# Patient Record
Sex: FEMALE | Race: WHITE | Employment: UNEMPLOYED | URBAN - METROPOLITAN AREA
[De-identification: names, ages, dates, MRNs, and addresses within clinical notes are randomized per-mention and may not be internally consistent; named-entity substitution may affect disease eponyms.]

---

## 2017-10-01 ENCOUNTER — HOSPITAL ENCOUNTER (EMERGENCY)
Age: 57
Discharge: HOME | End: 2017-10-02
Attending: EMERGENCY MEDICINE
Payer: SELF-PAY

## 2017-10-01 DIAGNOSIS — E87.6 HYPOKALEMIA: ICD-10-CM

## 2017-10-01 DIAGNOSIS — E83.42 HYPOMAGNESEMIA: ICD-10-CM

## 2017-10-01 DIAGNOSIS — R79.89 ELEVATED LFTS: ICD-10-CM

## 2017-10-01 DIAGNOSIS — K62.3 RECTAL PROLAPSE: ICD-10-CM

## 2017-10-01 DIAGNOSIS — F10.10 ALCOHOL ABUSE: Primary | ICD-10-CM

## 2017-10-01 LAB
ALBUMIN SERPL-MCNC: 3 G/DL (ref 3.4–5)
ALBUMIN/GLOB SERPL: 1 {RATIO} (ref 0.8–1.7)
ALP SERPL-CCNC: 143 U/L (ref 45–117)
ALT SERPL-CCNC: 117 U/L (ref 13–56)
ANION GAP SERPL CALC-SCNC: 10 MMOL/L (ref 3–18)
APPEARANCE UR: CLEAR
AST SERPL-CCNC: 211 U/L (ref 15–37)
BASOPHILS # BLD: 0 K/UL (ref 0–0.06)
BASOPHILS NFR BLD: 0 % (ref 0–2)
BILIRUB SERPL-MCNC: 0.3 MG/DL (ref 0.2–1)
BILIRUB UR QL: NEGATIVE
BUN SERPL-MCNC: 6 MG/DL (ref 7–18)
BUN/CREAT SERPL: 12 (ref 12–20)
CALCIUM SERPL-MCNC: 7.8 MG/DL (ref 8.5–10.1)
CHLORIDE SERPL-SCNC: 98 MMOL/L (ref 100–108)
CO2 SERPL-SCNC: 27 MMOL/L (ref 21–32)
COLOR UR: YELLOW
CREAT SERPL-MCNC: 0.49 MG/DL (ref 0.6–1.3)
DIFFERENTIAL METHOD BLD: ABNORMAL
EOSINOPHIL # BLD: 0.2 K/UL (ref 0–0.4)
EOSINOPHIL NFR BLD: 4 % (ref 0–5)
ERYTHROCYTE [DISTWIDTH] IN BLOOD BY AUTOMATED COUNT: 14.1 % (ref 11.6–14.5)
GLOBULIN SER CALC-MCNC: 2.9 G/DL (ref 2–4)
GLUCOSE SERPL-MCNC: 210 MG/DL (ref 74–99)
GLUCOSE UR STRIP.AUTO-MCNC: 250 MG/DL
HCT VFR BLD AUTO: 35.5 % (ref 35–45)
HGB BLD-MCNC: 12.8 G/DL (ref 12–16)
HGB UR QL STRIP: NEGATIVE
KETONES UR QL STRIP.AUTO: NEGATIVE MG/DL
LEUKOCYTE ESTERASE UR QL STRIP.AUTO: NEGATIVE
LYMPHOCYTES # BLD: 1.6 K/UL (ref 0.9–3.6)
LYMPHOCYTES NFR BLD: 31 % (ref 21–52)
MAGNESIUM SERPL-MCNC: 1.3 MG/DL (ref 1.6–2.6)
MCH RBC QN AUTO: 32.5 PG (ref 24–34)
MCHC RBC AUTO-ENTMCNC: 36.1 G/DL (ref 31–37)
MCV RBC AUTO: 90.1 FL (ref 74–97)
MONOCYTES # BLD: 1.1 K/UL (ref 0.05–1.2)
MONOCYTES NFR BLD: 21 % (ref 3–10)
NEUTS SEG # BLD: 2.3 K/UL (ref 1.8–8)
NEUTS SEG NFR BLD: 44 % (ref 40–73)
NITRITE UR QL STRIP.AUTO: NEGATIVE
PH UR STRIP: 7 [PH] (ref 5–8)
PLATELET # BLD AUTO: 162 K/UL (ref 135–420)
PMV BLD AUTO: 9.8 FL (ref 9.2–11.8)
POTASSIUM SERPL-SCNC: 2.7 MMOL/L (ref 3.5–5.5)
PROT SERPL-MCNC: 5.9 G/DL (ref 6.4–8.2)
PROT UR STRIP-MCNC: NEGATIVE MG/DL
RBC # BLD AUTO: 3.94 M/UL (ref 4.2–5.3)
SODIUM SERPL-SCNC: 135 MMOL/L (ref 136–145)
SP GR UR REFRACTOMETRY: 1.01 (ref 1–1.03)
UROBILINOGEN UR QL STRIP.AUTO: 1 EU/DL (ref 0.2–1)
WBC # BLD AUTO: 5.1 K/UL (ref 4.6–13.2)

## 2017-10-01 PROCEDURE — 74011250637 HC RX REV CODE- 250/637: Performed by: EMERGENCY MEDICINE

## 2017-10-01 PROCEDURE — 74011000250 HC RX REV CODE- 250: Performed by: EMERGENCY MEDICINE

## 2017-10-01 PROCEDURE — 83735 ASSAY OF MAGNESIUM: CPT | Performed by: EMERGENCY MEDICINE

## 2017-10-01 PROCEDURE — 96365 THER/PROPH/DIAG IV INF INIT: CPT

## 2017-10-01 PROCEDURE — 96375 TX/PRO/DX INJ NEW DRUG ADDON: CPT

## 2017-10-01 PROCEDURE — 96368 THER/DIAG CONCURRENT INF: CPT

## 2017-10-01 PROCEDURE — 81003 URINALYSIS AUTO W/O SCOPE: CPT | Performed by: EMERGENCY MEDICINE

## 2017-10-01 PROCEDURE — 74011250636 HC RX REV CODE- 250/636: Performed by: EMERGENCY MEDICINE

## 2017-10-01 PROCEDURE — 85025 COMPLETE CBC W/AUTO DIFF WBC: CPT | Performed by: EMERGENCY MEDICINE

## 2017-10-01 PROCEDURE — 80053 COMPREHEN METABOLIC PANEL: CPT | Performed by: EMERGENCY MEDICINE

## 2017-10-01 PROCEDURE — 96366 THER/PROPH/DIAG IV INF ADDON: CPT

## 2017-10-01 PROCEDURE — 99284 EMERGENCY DEPT VISIT MOD MDM: CPT

## 2017-10-01 RX ORDER — POTASSIUM CHLORIDE 7.45 MG/ML
10 INJECTION INTRAVENOUS
Status: COMPLETED | OUTPATIENT
Start: 2017-10-01 | End: 2017-10-01

## 2017-10-01 RX ORDER — MAGNESIUM SULFATE HEPTAHYDRATE 40 MG/ML
2 INJECTION, SOLUTION INTRAVENOUS ONCE
Status: COMPLETED | OUTPATIENT
Start: 2017-10-01 | End: 2017-10-02

## 2017-10-01 RX ORDER — ONDANSETRON 2 MG/ML
4 INJECTION INTRAMUSCULAR; INTRAVENOUS
Status: COMPLETED | OUTPATIENT
Start: 2017-10-01 | End: 2017-10-01

## 2017-10-01 RX ORDER — POTASSIUM CHLORIDE 20 MEQ/1
40 TABLET, EXTENDED RELEASE ORAL
Status: COMPLETED | OUTPATIENT
Start: 2017-10-01 | End: 2017-10-01

## 2017-10-01 RX ADMIN — SODIUM CHLORIDE 1000 ML: 900 INJECTION, SOLUTION INTRAVENOUS at 22:22

## 2017-10-01 RX ADMIN — POTASSIUM CHLORIDE 40 MEQ: 20 TABLET, EXTENDED RELEASE ORAL at 22:58

## 2017-10-01 RX ADMIN — ONDANSETRON 4 MG: 2 INJECTION INTRAMUSCULAR; INTRAVENOUS at 22:22

## 2017-10-01 RX ADMIN — POTASSIUM CHLORIDE 10 MEQ: 7.46 INJECTION, SOLUTION INTRAVENOUS at 22:59

## 2017-10-01 RX ADMIN — FOLIC ACID: 5 INJECTION, SOLUTION INTRAMUSCULAR; INTRAVENOUS; SUBCUTANEOUS at 22:21

## 2017-10-01 RX ADMIN — MAGNESIUM SULFATE HEPTAHYDRATE 2 G: 40 INJECTION, SOLUTION INTRAVENOUS at 23:33

## 2017-10-02 VITALS
DIASTOLIC BLOOD PRESSURE: 67 MMHG | HEIGHT: 69 IN | TEMPERATURE: 98.5 F | HEART RATE: 77 BPM | RESPIRATION RATE: 11 BRPM | OXYGEN SATURATION: 99 % | BODY MASS INDEX: 19.55 KG/M2 | WEIGHT: 132 LBS | SYSTOLIC BLOOD PRESSURE: 112 MMHG

## 2017-10-02 PROCEDURE — 74011250636 HC RX REV CODE- 250/636: Performed by: EMERGENCY MEDICINE

## 2017-10-02 RX ORDER — CALCIUM CARBONATE/VITAMIN D3 500-10/5ML
1 LIQUID (ML) ORAL DAILY
Qty: 6 TAB | Refills: 0 | Status: SHIPPED | OUTPATIENT
Start: 2017-10-02 | End: 2017-11-05

## 2017-10-02 RX ORDER — POTASSIUM CHLORIDE 20 MEQ/1
20 TABLET, EXTENDED RELEASE ORAL 2 TIMES DAILY
Qty: 6 TAB | Refills: 0 | Status: SHIPPED | OUTPATIENT
Start: 2017-10-02 | End: 2017-11-05

## 2017-10-02 RX ADMIN — MAGNESIUM SULFATE HEPTAHYDRATE 2 G: 40 INJECTION, SOLUTION INTRAVENOUS at 00:12

## 2017-10-02 NOTE — DISCHARGE INSTRUCTIONS
Electrolyte Imbalance: Care Instructions  Your Care Instructions  Electrolytes are minerals in your blood. They include sodium, potassium, calcium, and magnesium. When they are not at the right levels, you can feel very ill. You may not know what is causing it, but you know something is wrong. You may feel weak or numb, have muscle spasms, or twitch. Your heart may beat fast. Symptoms are different with each mineral. Too much is as bad as too little. Minerals help keep your body working as it should. Vomiting, diarrhea, and fever can cause you to lose minerals. A problem with your kidneys can tip a mineral out of balance. So can taking certain medicines. Your doctor may do more tests. He or she may change your medicine and diet. If you are low in one or more minerals, they may be given through a tube into your vein (IV). Your doctor may have you take or drink special fluids or pills. If your kidneys are failing, your blood may be filtered. This is called dialysis. It can put the minerals back in balance. Follow-up care is a key part of your treatment and safety. Be sure to make and go to all appointments, and call your doctor if you are having problems. It's also a good idea to know your test results and keep a list of the medicines you take. How can you care for yourself at home? · Take your medicines exactly as prescribed. Call your doctor if you have any problems with your medicines. You will get more details on the specific medicines your doctor prescribes. · Do not take any medicine without talking to your doctor first. This includes prescription, over-the-counter, and herbal medicines. · If you have kidney, heart, or liver disease and have to limit fluids, talk with your doctor before you increase the amount of fluids you drink. · Your doctor or dietitian may give you a diet plan to help balance your minerals. Follow the diet carefully. When should you call for help?   Call 911 anytime you think you may need emergency care. For example, call if:  · You passed out (lost consciousness). · Your heart seems to be speeding up and then slowing down or skipping beats. Call your doctor now or seek immediate medical care if:  · You are very tired and have no energy. · You have trouble thinking or concentrating. Watch closely for changes in your health, and be sure to contact your doctor if:  · You want advice on what to do to keep your minerals in balance. · You do not get better as expected. Where can you learn more? Go to http://charles-bernie.info/. Enter W699 in the search box to learn more about \"Electrolyte Imbalance: Care Instructions. \"  Current as of: July 26, 2016  Content Version: 11.3  © 9073-3395 [employer] ([employer] Care instructions adapted under license by [email or web address] (which disclaims liability or warranty for this information). If you have questions about a medical condition or this instruction, always ask your healthcare professional. [address] any warranty or liability for your use of this information. Learning About Alcohol Misuse  What is alcohol misuse? Alcohol misuse means drinking so much that it causes problems for you or others. Early problems with alcohol can start at home. You may argue with loved ones about how much you're drinking. Your job may be affected because of drinking. You may drink when it's dangerous or illegal, such as when you drive. Drinking too much for a long time can lead to health conditions like high blood pressure and liver problems. What are the symptoms? Symptoms of alcohol misuse may include:  · Drinking much more than you planned. · Drinking even though it's causing problems for you or others. · Putting yourself in situations where you might get hurt. · Wanting to cut down or stop drinking, but not being able to. · Feeling guilty about how much you're drinking.   How is alcohol misuse treated? Getting help for problems with alcohol is up to you. But you don't have to do it alone. There are many people and kinds of treatments to help with alcohol problems. Talking to your doctor is the first step. When you get a doctor's help, treatment for alcohol problems can be safer and quicker. Treatment options can include:  · Treatment programs. Examples are group therapy, one or more types of counseling, and alcohol education. · Medicines. A doctor or counselor can help you know what kinds of medicines might help with cravings. · Free social support groups. These groups include AA (Alcoholics Anonymous) and SMART (Self-Management and Recovery Training). Your doctor can help you decide which type of program is best for you. Follow-up care is a key part of your treatment and safety. Be sure to make and go to all appointments, and call your doctor if you are having problems. It's also a good idea to know your test results and keep a list of the medicines you take. Where can you learn more? Go to http://charles-bernie.info/. Enter [id number] in the search box to learn more about \"Learning About Alcohol Misuse. \"  Current as of: January 3, 2017  Content Version: 11.3  © 4597-2649 [employer]. Care instructions adapted under license by [employer] (which disclaims liability or warranty for this information). If you have questions about a medical condition or this instruction, always ask your healthcare professional. [place] [address] any warranty or liability for your use of this information.

## 2017-10-02 NOTE — ED PROVIDER NOTES
HPI Comments: Patient with a history of anxiety tobacco abuse alcohol abuse psychiatric disorder and depression COPD and rectal prolapse presents with diarrhea for the last 2 days, she reports nausea one episode of vomiting a few days ago that has resolved, she drank 2 cups of vodka today and has persistent diarrhea and feels dehydrated. She reports muscle cramping and a cough productive of greenish sputum, no measured fever at home. She has been trying to stay hydrated by drinking Gatorade and vodka. No urinary symptoms, reports that her rectal prolapse has recurred but she is able to reduce it herself    Patient is a 61 y.o. female presenting with vomiting and diarrhea. Vomiting    Associated symptoms include diarrhea. Pertinent negatives include no fever, no headaches, no myalgias and no headaches. Diarrhea    Associated symptoms include diarrhea and vomiting. Pertinent negatives include no fever, no dysuria, no headaches, no myalgias and no chest pain. Past Medical History:   Diagnosis Date    Alcohol abuse     Anxiety     Chronic obstructive pulmonary disease ([address]     Depression     Hypertension     Psychiatric disorder     Tobacco abuse        Past Surgical History:   Procedure Laterality Date    HX GYN               Family History:   Problem Relation Age of Onset    Diabetes Mother     Hypertension Mother     Heart Attack Mother     Hypertension Father     Diabetes Brother        Social History     Social History    Marital status:      Spouse name: N/A    Number of children: N/A    Years of education: N/A     Occupational History    Not on file.      Social History Main Topics    Smoking status: Current Every Day Smoker     Packs/day: 0.50    Smokeless tobacco: Never Used    Alcohol use Yes      Comment: socially    Drug use: No    Sexual activity: Not on file     Other Topics Concern    Not on file     Social History Narrative         ALLERGIES: Codeine    Review of Systems   Constitutional: Negative for fever. HENT: Negative for nosebleeds. Eyes: Negative for visual disturbance. Respiratory: Negative for shortness of breath. Cardiovascular: Negative for chest pain. Gastrointestinal: Positive for diarrhea and vomiting. Negative for blood in stool. Genitourinary: Negative for dysuria. Musculoskeletal: Negative for myalgias. Skin: Negative for rash. Neurological: Negative for headaches. All other systems reviewed and are negative. Vitals:    10/01/17 2133 10/02/17 0030 10/02/17 0045 10/02/17 0100   BP: 135/89 (!) 127/96 94/53 112/67   Pulse: 83 78 77    Resp: 16 11     Temp: 98.5 °F (36.9 °C)      SpO2: 99%      Weight: 59.9 kg (132 lb)      Height: 5' 9\" (1.753 m)               Physical Exam   Constitutional:   General:  Well-developed, well-nourished, nontoxic nondiaphoreticspeech. Head:  Normocephalic atraumatic. Eyes:  Pupils midrange extraocular movements intact. No pallor or conjunctival injection. Nose:  No rhinorrhea, inspection grossly normal.    Ears:  Grossly normal to inspection, no discharge. Mouth:  Mucous membranes dry , no appreciable intraoral lesion. Neck/Back:  Trachea midline, no asymmetry. Chest:  Grossly normal inspection, symmetric chest rise. Pulmonary:  Clear to auscultation bilaterally no wheezes rhonchi or rales. Cardiovascular:  S1-S2 no murmurs rubs or gallops. Abdomen: Soft, mildly tender suprapubic, nondistended no guarding rebound or peritoneal signs. No CVA tenderness  Extremities:  Grossly normal to inspection, peripheral pulses intact    Neurologic:  Alert and oriented no appreciable focal neurologic deficit. Skin:  Warm and dry  Psychiatric:  Grossly normal mood and affect. Nursing note reviewed, vital signs reviewed.           MDM  Number of Diagnoses or Management Options  Alcohol abuse:   Elevated LFTs:   Hypokalemia:   Hypomagnesemia:   Diagnosis management comments: ED course:  Patient presents with diarrhea for last 2 days, not does nonbloody cough greenish productive sputum will diaphragm infectious etiology send labs, she is clinically dehydrated and having muscle cramping so we will evaluate for electrolyte abnormality. We'll hydrate here. She is also clinically intoxicated, was encouraged to reduce her alcohol usage    Labs are significant for a low potassium 2.7, low magnesium 1.3 both replaced intravenously in by mouth, her LFTs are elevated but baseline abnormal, she feels better after hydration, will be discharged home, encouraged moderation of alcohol usage follow with PCP for further management and push electrolyte fluid, return here with any concerns. Patient's presentation, history, physical exam and laboratory evaluations were reviewed. At this time patient was felt to be stable for outpatient management and follow with primary care/specialist.  Patient was instructed to return to the emergency department with any concerns. Disposition:    Discharged home      Portions of this chart were created with Dragon medical speech to text program.   Unrecognized errors may be present.       ED Course       Procedures

## 2017-10-04 ENCOUNTER — HOSPITAL ENCOUNTER (EMERGENCY)
Age: 57
Discharge: HOME | End: 2017-10-04
Attending: EMERGENCY MEDICINE
Payer: SELF-PAY

## 2017-10-04 VITALS
OXYGEN SATURATION: 100 % | HEIGHT: 70 IN | HEART RATE: 88 BPM | DIASTOLIC BLOOD PRESSURE: 74 MMHG | SYSTOLIC BLOOD PRESSURE: 105 MMHG | RESPIRATION RATE: 16 BRPM | TEMPERATURE: 98.4 F

## 2017-10-04 DIAGNOSIS — Z72.0 TOBACCO ABUSE: ICD-10-CM

## 2017-10-04 DIAGNOSIS — E83.42 HYPOMAGNESEMIA: ICD-10-CM

## 2017-10-04 DIAGNOSIS — E87.6 HYPOKALEMIA: ICD-10-CM

## 2017-10-04 DIAGNOSIS — F10.920 ALCOHOLIC INTOXICATION WITHOUT COMPLICATION (HCC): Primary | ICD-10-CM

## 2017-10-04 LAB
ALBUMIN SERPL-MCNC: 3.2 G/DL (ref 3.4–5)
ALBUMIN/GLOB SERPL: 1 {RATIO} (ref 0.8–1.7)
ALP SERPL-CCNC: 163 U/L (ref 45–117)
ALT SERPL-CCNC: 164 U/L (ref 13–56)
AMPHET UR QL SCN: NEGATIVE
ANION GAP SERPL CALC-SCNC: 8 MMOL/L (ref 3–18)
APPEARANCE UR: CLEAR
AST SERPL-CCNC: 388 U/L (ref 15–37)
BACTERIA URNS QL MICRO: ABNORMAL /HPF
BARBITURATES UR QL SCN: NEGATIVE
BASOPHILS # BLD: 0 K/UL (ref 0–0.06)
BASOPHILS NFR BLD: 1 % (ref 0–2)
BENZODIAZ UR QL: NEGATIVE
BILIRUB SERPL-MCNC: 0.2 MG/DL (ref 0.2–1)
BILIRUB UR QL: NEGATIVE
BUN SERPL-MCNC: 7 MG/DL (ref 7–18)
BUN/CREAT SERPL: 16 (ref 12–20)
CALCIUM SERPL-MCNC: 8.8 MG/DL (ref 8.5–10.1)
CANNABINOIDS UR QL SCN: NEGATIVE
CHLORIDE SERPL-SCNC: 106 MMOL/L (ref 100–108)
CO2 SERPL-SCNC: 34 MMOL/L (ref 21–32)
COCAINE UR QL SCN: NEGATIVE
COLOR UR: YELLOW
CREAT SERPL-MCNC: 0.44 MG/DL (ref 0.6–1.3)
DIFFERENTIAL METHOD BLD: ABNORMAL
EOSINOPHIL # BLD: 0.2 K/UL (ref 0–0.4)
EOSINOPHIL NFR BLD: 4 % (ref 0–5)
EPITH CASTS URNS QL MICRO: ABNORMAL /LPF (ref 0–5)
ERYTHROCYTE [DISTWIDTH] IN BLOOD BY AUTOMATED COUNT: 15.4 % (ref 11.6–14.5)
ETHANOL SERPL-MCNC: 414 MG/DL (ref 0–3)
GLOBULIN SER CALC-MCNC: 3.2 G/DL (ref 2–4)
GLUCOSE SERPL-MCNC: 92 MG/DL (ref 74–99)
GLUCOSE UR STRIP.AUTO-MCNC: NEGATIVE MG/DL
HCT VFR BLD AUTO: 37.4 % (ref 35–45)
HDSCOM,HDSCOM: NORMAL
HGB BLD-MCNC: 13.1 G/DL (ref 12–16)
HGB UR QL STRIP: ABNORMAL
KETONES UR QL STRIP.AUTO: NEGATIVE MG/DL
LEUKOCYTE ESTERASE UR QL STRIP.AUTO: NEGATIVE
LYMPHOCYTES # BLD: 2.3 K/UL (ref 0.9–3.6)
LYMPHOCYTES NFR BLD: 39 % (ref 21–52)
MAGNESIUM SERPL-MCNC: 1.3 MG/DL (ref 1.6–2.6)
MCH RBC QN AUTO: 32.7 PG (ref 24–34)
MCHC RBC AUTO-ENTMCNC: 35 G/DL (ref 31–37)
MCV RBC AUTO: 93.3 FL (ref 74–97)
METHADONE UR QL: NEGATIVE
MONOCYTES # BLD: 0.7 K/UL (ref 0.05–1.2)
MONOCYTES NFR BLD: 12 % (ref 3–10)
NEUTS SEG # BLD: 2.7 K/UL (ref 1.8–8)
NEUTS SEG NFR BLD: 44 % (ref 40–73)
NITRITE UR QL STRIP.AUTO: NEGATIVE
OPIATES UR QL: NEGATIVE
PCP UR QL: NEGATIVE
PH UR STRIP: 7.5 [PH] (ref 5–8)
PLATELET # BLD AUTO: 220 K/UL (ref 135–420)
PMV BLD AUTO: 10.1 FL (ref 9.2–11.8)
POTASSIUM SERPL-SCNC: 3.2 MMOL/L (ref 3.5–5.5)
PROT SERPL-MCNC: 6.4 G/DL (ref 6.4–8.2)
PROT UR STRIP-MCNC: NEGATIVE MG/DL
RBC # BLD AUTO: 4.01 M/UL (ref 4.2–5.3)
RBC #/AREA URNS HPF: 0 /HPF (ref 0–5)
SODIUM SERPL-SCNC: 148 MMOL/L (ref 136–145)
SP GR UR REFRACTOMETRY: 1.01 (ref 1–1.03)
UROBILINOGEN UR QL STRIP.AUTO: 0.2 EU/DL (ref 0.2–1)
WBC # BLD AUTO: 5.9 K/UL (ref 4.6–13.2)
WBC URNS QL MICRO: ABNORMAL /HPF (ref 0–4)

## 2017-10-04 PROCEDURE — 80307 DRUG TEST PRSMV CHEM ANLYZR: CPT | Performed by: NURSE PRACTITIONER

## 2017-10-04 PROCEDURE — 83735 ASSAY OF MAGNESIUM: CPT | Performed by: NURSE PRACTITIONER

## 2017-10-04 PROCEDURE — 96360 HYDRATION IV INFUSION INIT: CPT

## 2017-10-04 PROCEDURE — 74011250636 HC RX REV CODE- 250/636: Performed by: NURSE PRACTITIONER

## 2017-10-04 PROCEDURE — 85025 COMPLETE CBC W/AUTO DIFF WBC: CPT | Performed by: NURSE PRACTITIONER

## 2017-10-04 PROCEDURE — 99284 EMERGENCY DEPT VISIT MOD MDM: CPT

## 2017-10-04 PROCEDURE — 80053 COMPREHEN METABOLIC PANEL: CPT | Performed by: NURSE PRACTITIONER

## 2017-10-04 PROCEDURE — 74011250637 HC RX REV CODE- 250/637: Performed by: EMERGENCY MEDICINE

## 2017-10-04 PROCEDURE — 81001 URINALYSIS AUTO W/SCOPE: CPT | Performed by: NURSE PRACTITIONER

## 2017-10-04 RX ORDER — LANOLIN ALCOHOL/MO/W.PET/CERES
800 CREAM (GRAM) TOPICAL ONCE
Status: COMPLETED | OUTPATIENT
Start: 2017-10-04 | End: 2017-10-04

## 2017-10-04 RX ORDER — POTASSIUM CHLORIDE 20 MEQ/1
40 TABLET, EXTENDED RELEASE ORAL
Status: DISCONTINUED | OUTPATIENT
Start: 2017-10-04 | End: 2017-10-04

## 2017-10-04 RX ORDER — POTASSIUM CHLORIDE 20 MEQ/1
40 TABLET, EXTENDED RELEASE ORAL
Status: COMPLETED | OUTPATIENT
Start: 2017-10-04 | End: 2017-10-04

## 2017-10-04 RX ORDER — MAGNESIUM SULFATE HEPTAHYDRATE 40 MG/ML
2 INJECTION, SOLUTION INTRAVENOUS
Status: DISCONTINUED | OUTPATIENT
Start: 2017-10-04 | End: 2017-10-04

## 2017-10-04 RX ADMIN — SODIUM CHLORIDE 1000 ML: 900 INJECTION, SOLUTION INTRAVENOUS at 12:51

## 2017-10-04 RX ADMIN — POTASSIUM CHLORIDE 40 MEQ: 1500 TABLET, FILM COATED, EXTENDED RELEASE ORAL at 14:13

## 2017-10-04 RX ADMIN — Medication 800 MG: at 14:13

## 2017-10-04 NOTE — ED NOTES
Was called by another nurse for assistance with pt. Pt noted in hallway bathroom, IV removed per pt and blood trail noted from bed to bathroom. Pt noted cussing staff with verbal redirection unsuccessful. IV site noted with bleeding controlled with pressure drsng. Per staff, pt did not call for assistance, pulled out IV and ambulated per self down hallway. Pt refusing all treatment at this time and states \"I want to go home. \"  Provider notified.

## 2017-10-04 NOTE — ED TRIAGE NOTES
Pt arrived via rescue from home with c/o prolapsed rectum and alcohol intoxication. Pt seen here 2 days ago for same complaints.

## 2017-10-04 NOTE — PROGRESS NOTES
Referral received from ED NP to assist pt with programs for detox. Chart reviewed. Met with pt. She states that rectal pain is causing her to drink. Pt provided with [facility] intake information and said \"I've been there before. \"

## 2017-10-04 NOTE — ED PROVIDER NOTES
HPI Comments: Luther Garcia is a 61year old female with a PMHX Essential Hypertension, COPD, Depression, Tobacco Abuse, and Alcohol abuse arrived to ER for medical evaluation. Patient report for the past week she has been drinking excessively. States, \"I really want to stop but I need help. I drank three cans of beer this morning. \"  She reports smokes 1/2 pack of cigarettes per day. Patient denies thoughts of suicide or wanting to harm others. Patient also reports she was diagnosed with a prolapse rectum and hasn't got it repaired. States, \"It popped out today but I was able to put it back in. I squeeze my butt. Its ok now. \"  Patient denies abdominal pain, N/V/D, diarrhea, melena. Patient reports she has been tolerating po fluids and solids. Denies any recent travels. Denies fever, chills, headache, dizziness, fatigue, CP,SOB, flank pain, urinary sx's, or any other concerns. Patient is a 61 y.o. female presenting with rectal pain and intoxication. The history is provided by the patient. Anal Pain    Associated symptoms include rectal pain. Pertinent negatives include no fever, no abdominal pain, no diarrhea, no nausea, no vomiting, no chest pain and no coughing. Alcohol intoxication   Primary symptoms include: intoxication. There areno self-injury present at this time. Pertinent negatives include no fever, no nausea and no vomiting. Associated medical issues do not include suicidal ideas.         Past Medical History:   Diagnosis Date    Alcohol abuse     Anxiety     Chronic obstructive pulmonary disease ([facility] [address]     Depression     Essential hypertension     Hypertension     Psychiatric disorder     Tobacco abuse        Past Surgical History:   Procedure Laterality Date    HX GYN               Family History:   Problem Relation Age of Onset    Diabetes Mother     Hypertension Mother     Heart Attack Mother     Hypertension Father     Diabetes Brother        Social History     Social History    Marital status:      Spouse name: N/A    Number of children: N/A    Years of education: N/A     Occupational History    Not on file. Social History Main Topics    Smoking status: Current Every Day Smoker     Packs/day: 0.50    Smokeless tobacco: Never Used    Alcohol use Yes      Comment: regularly    Drug use: No    Sexual activity: Not on file     Other Topics Concern    Not on file     Social History Narrative         ALLERGIES: Codeine    Review of Systems   Constitutional: Negative. Negative for activity change, appetite change, chills, fatigue and fever. HENT: Negative. Respiratory: Negative. Negative for cough and shortness of breath. Cardiovascular: Negative. Negative for chest pain, palpitations and leg swelling. Gastrointestinal: Positive for rectal pain. Negative for abdominal distention, abdominal pain, anal bleeding, blood in stool, constipation, diarrhea, nausea and vomiting. Genitourinary: Negative. Musculoskeletal: Negative. Neurological: Negative. Psychiatric/Behavioral: Negative for self-injury, sleep disturbance and suicidal ideas. Pt c/o excessive alcohol drinking and requesting assistance       Vitals:    10/04/17 1139 10/04/17 1141   BP: 105/74    Pulse: 88    Resp: 16    Temp:  98.4 °F (36.9 °C)   SpO2: 100%    Height: 5' 10\" (1.778 m)             Physical Exam   Constitutional: She is oriented to person, place, and time. She appears well-developed. No distress. Patient smells of alcohol   HENT:   Right Ear: Hearing, tympanic membrane, external ear and ear canal normal.   Left Ear: Hearing, tympanic membrane, external ear and ear canal normal.   Nose: Nose normal.   Mouth/Throat: Uvula is midline. Mucous membranes are dry. No oropharyngeal exudate, posterior oropharyngeal edema, posterior oropharyngeal erythema or tonsillar abscesses. Cardiovascular: Normal rate, regular rhythm and normal heart sounds.   Exam reveals no gallop and no friction rub. No murmur heard. Pulmonary/Chest: Effort normal and breath sounds normal. No respiratory distress. She has no wheezes. She has no rales. She exhibits no tenderness. Abdominal: Soft. Normal appearance and bowel sounds are normal. She exhibits no shifting dullness, no distension and no mass. There is no tenderness. There is no rigidity, no rebound, no guarding, no CVA tenderness, no tenderness at McBurney's point and negative Page's sign. Genitourinary: Rectum normal. Rectal exam shows no external hemorrhoid, no tenderness and guaiac negative stool. Genitourinary Comments: Chetna Leyva RN at bedside for rectal examination. No prolapse rectum   Musculoskeletal: Normal range of motion. Neurological: She is alert and oriented to person, place, and time. She has normal reflexes. She displays normal reflexes. She exhibits normal muscle tone. Coordination normal.   Skin: Skin is warm. She is not diaphoretic. Psychiatric: She has a normal mood and affect. Her behavior is normal. Judgment and thought content normal.   Nursing note and vitals reviewed. MDM  Number of Diagnoses or Management Options  Alcoholic intoxication without complication ([facility] [address]): Hypokalemia:   Hypomagnesemia:   Tobacco abuse:   Diagnosis management comments: Clinical Impression/plan:    12:35 PM:  Patient ambulated down burger to bathroom, gait steady. 12:40 PM:  Nurse verbalizes patient requesting to leave. 13:45 PM:  Patient requesting a warm meal and she will stay. Diet tray ordered. She agrees to have IV fluids. Report and care turned over to Dr. Karla Lizama.     Manjinder Hammond NP         Amount and/or Complexity of Data Reviewed  Clinical lab tests: ordered and reviewed  Review and summarize past medical records: yes  Discuss the patient with other providers: yes    Risk of Complications, Morbidity, and/or Mortality  Presenting problems: moderate  Diagnostic procedures: moderate  Management options: moderate      ED Course       Procedures

## 2017-11-05 ENCOUNTER — HOSPITAL ENCOUNTER (EMERGENCY)
Age: 57
Discharge: HOME | End: 2017-11-05
Attending: EMERGENCY MEDICINE | Admitting: EMERGENCY MEDICINE
Payer: SELF-PAY

## 2017-11-05 VITALS
HEART RATE: 88 BPM | HEIGHT: 69 IN | DIASTOLIC BLOOD PRESSURE: 76 MMHG | BODY MASS INDEX: 14.81 KG/M2 | SYSTOLIC BLOOD PRESSURE: 133 MMHG | TEMPERATURE: 98.1 F | OXYGEN SATURATION: 98 % | RESPIRATION RATE: 14 BRPM | WEIGHT: 100 LBS

## 2017-11-05 DIAGNOSIS — K62.3 RECTAL PROLAPSE: Primary | ICD-10-CM

## 2017-11-05 DIAGNOSIS — R03.0 ELEVATED BLOOD PRESSURE READING: ICD-10-CM

## 2017-11-05 PROCEDURE — 99283 EMERGENCY DEPT VISIT LOW MDM: CPT

## 2017-11-05 RX ORDER — HYDROCORTISONE ACETATE 25 MG/1
25 SUPPOSITORY RECTAL EVERY 12 HOURS
Qty: 12 SUPPOSITORY | Refills: 0 | Status: SHIPPED | OUTPATIENT
Start: 2017-11-05 | End: 2017-12-08

## 2017-11-05 NOTE — ED PROVIDER NOTES
Patient is a 61 y.o. female presenting with rectal pain. The history is provided by the patient and the spouse. Anal Pain    Associated symptoms include rectal pain. Tino Griffith is a 61 y.o. female with history of Alcohol abuse, rectal prolapse, COPD, Psychiatric disorder presents with rectal prolapse. Satpatricio keeps falling out when she stands up. States it is getting worse. States has not been to see surgeon. Is able to reduce manually. States sharp pain when happens. No pain at this time. Denies fever or n/v. States is able to have bowel movements. Past Medical History:   Diagnosis Date    Alcohol abuse     Anxiety     Chronic obstructive pulmonary disease ([facility] [address]     Depression     Essential hypertension     Hypertension     Psychiatric disorder     Tobacco abuse        Past Surgical History:   Procedure Laterality Date    HX GYN               Family History:   Problem Relation Age of Onset    Diabetes Mother     Hypertension Mother     Heart Attack Mother     Hypertension Father     Diabetes Brother        Social History     Social History    Marital status:      Spouse name: N/A    Number of children: N/A    Years of education: N/A     Occupational History    Not on file. Social History Main Topics    Smoking status: Current Every Day Smoker     Packs/day: 0.50    Smokeless tobacco: Never Used    Alcohol use Yes      Comment: regularly    Drug use: No    Sexual activity: Not on file     Other Topics Concern    Not on file     Social History Narrative         ALLERGIES: Codeine    Review of Systems   Gastrointestinal: Positive for rectal pain. Constitutional:  Denies malaise, fever, chills. Head:  Denies injury. Face:  Denies injury or pain. ENMT:  Denies sore throat. Neck:  Denies injury or pain. Chest:  Denies injury. Cardiac:  Denies chest pain or palpitations.    Respiratory:  Denies cough, wheezing, difficulty breathing, shortness of breath. GI/ABD:  Denies injury, pain, distention, nausea, vomiting, diarrhea. :  Rectal pain Denies injury, dysuria or urgency. Back:  Denies injury or pain. Pelvis:  Denies injury or pain. Extremity/MS:  Denies injury or pain. Neuro:  Denies headache, LOC, dizziness, neurologic symptoms/deficits/paresthesias. Skin: Denies injury, rash, itching or skin changes. Vitals:    11/05/17 0749   BP: (!) 133/96   Pulse: 88   Resp: 16   Temp: 98.1 °F (36.7 °C)   SpO2: 97%   Weight: 45.4 kg (100 lb)   Height: 5' 9\" (1.753 m)            Physical Exam   Nursing note and vitals reviewed. CONSTITUTIONAL: Alert, in no apparent distress; well-developed; well-nourished. HEAD:  Normocephalic, atraumatic. EYES: PERRL; EOM's intact. ENTM: Nose: No rhinorrhea; Throat: mucous membranes moist. Posterior pharynx-normal.  Neck:  No JVD, supple without lymphadenopathy. RESP: Chest clear, equal breath sounds. CV: S1 and S2 WNL; No murmurs, gallops or rubs. GI: Abdomen soft and non-tender. No masses or organomegaly. : Rectum with good tone, no prolapse at this time. UPPER EXT:  Normal inspection. LOWER EXT: Normal inspection. NEURO: strength 5/5 and sym, sensation intact. SKIN: No rashes; Normal for age and stage. PSYCH:  Alert and oriented, normal affect. MDM  Number of Diagnoses or Management Options  Elevated blood pressure reading:   Rectal prolapse:   Diagnosis management comments: IMPRESSION AND MEDICAL DECISION MAKING:  Based upon the patient's presentation with noted HPI and PE, along with the work up done in the emergency department, I believe that the patient has history of rectal prolapse. Is reduced at this time. Will give Pt name of Surgeon and also refer her to  for assistance. No signs of obstruction at this time. No signs of infection, abscess, or hemorrhoids. Will also have her follow up with her PCP.   PROGRESS NOTE: One or more blood pressure readings were noted elevated during the Pt's presentation in the emergency department this date. This abnormal reading has been cited in the Pt's diagnosis, and they have been encouraged to follow up with their primary care physician, or referred to a consultant for further evaluation and treatment. Pt advised of elevated BP. Advised Pt the need for follow up for the elevated BP. Advised Pt to monitor and record BP readings. ACEP guidelines are  Initiating treatment for asymptomatic hypertension in the ED is not necessary when patients have follow-up; (2) Rapidly lowering blood pressure in asymptomatic patients in the ED is unnecessary and may be harmful in some patients; (3) When ED treatment for asymptomatic hypertension is initiated, blood pressure management should attempt to gradually lower blood pressure and should not be expected to be normalized during the initial ED visit. The patient will be discharged home. Warning signs of worsening condition were discussed and understood by the patient. Based on patient's age, coexisting illness, exam, and the results of this ED evaluation, the decision to treat as an outpatient was made. Based on the information available at time of discharge, acute pathology requiring immediate intervention was deemed relative unlikely. While it is impossible to completely exclude the possibility of underlying serious disease or worsening of condition, I feel the relative likelihood is extremely low. I discussed this uncertainty with the patient, who understood ED evaluation and treatment and felt comfortable with the outpatient treatment plan. All questions regarding care, test results, and follow up were answered. The patient is stable and appropriate to discharge. They understand that they should return to the emergency department for any new or worsening symptoms. I stressed the importance of follow up for repeat assessment and possibly further evaluation/treatment.            Amount and/or Complexity of Data Reviewed  Review and summarize past medical records: yes      ED Course       Procedures

## 2017-11-05 NOTE — DISCHARGE INSTRUCTIONS
Elevated Blood Pressure: Care Instructions  Your Care Instructions    Blood pressure is a measure of how hard the blood pushes against the walls of your arteries. It's normal for blood pressure to go up and down throughout the day. But if it stays up over time, you have high blood pressure. Two numbers tell you your blood pressure. The first number is the systolic pressure. It shows how hard the blood pushes when your heart is pumping. The second number is the diastolic pressure. It shows how hard the blood pushes between heartbeats, when your heart is relaxed and filling with blood. An ideal blood pressure in adults is less than 120/80 (say \"120 over 80\"). High blood pressure is 140/90 or higher. You have high blood pressure if your top number is 140 or higher or your bottom number is 90 or higher, or both. The main test for high blood pressure is simple, fast, and painless. To diagnose high blood pressure, your doctor will test your blood pressure at different times. After testing your blood pressure, your doctor may ask you to test it again when you are home. If you are diagnosed with high blood pressure, you can work with your doctor to make a long-term plan to manage it. Follow-up care is a key part of your treatment and safety. Be sure to make and go to all appointments, and call your doctor if you are having problems. It's also a good idea to know your test results and keep a list of the medicines you take. How can you care for yourself at home? · Do not smoke. Smoking increases your risk for heart attack and stroke. If you need help quitting, talk to your doctor about stop-smoking programs and medicines. These can increase your chances of quitting for good. · Stay at a healthy weight. · Try to limit how much sodium you eat to less than 2,300 milligrams (mg) a day. Your doctor may ask you to try to eat less than 1,500 mg a day. · Be physically active.  Get at least 30 minutes of exercise on most days of the week. Walking is a good choice. You also may want to do other activities, such as running, swimming, cycling, or playing tennis or team sports. · Avoid or limit alcohol. Talk to your doctor about whether you can drink any alcohol. · Eat plenty of fruits, vegetables, and low-fat dairy products. Eat less saturated and total fats. · Learn how to check your blood pressure at home. When should you call for help? Call your doctor now or seek immediate medical care if:  ? · Your blood pressure is much higher than normal (such as 180/110 or higher). ? · You think high blood pressure is causing symptoms such as:  ¨ Severe headache. ¨ Blurry vision. ? Watch closely for changes in your health, and be sure to contact your doctor if:  ? · You do not get better as expected. Where can you learn more? Go to http://charlesMtimebernie.info/. Enter J336 in the search box to learn more about \"Elevated Blood Pressure: Care Instructions. \"  Current as of: September 21, 2016  Content Version: 11.4  © 7209-4422 [employer]. Care instructions adapted under license by [employer] (which disclaims liability or warranty for this information). If you have questions about a medical condition or this instruction, always ask your healthcare professional. [address] any warranty or liability for your use of this information. Rectal Prolapse: Care Instructions  Your Care Instructions  A \"prolapse\" means that a body part has slipped forward or down from where it should be. The rectum is a tube at the end of the colon. At the end of the rectum is the anus, which is the opening where stool leaves the body. A rectal prolapse happens when part or all of the wall of the rectum slides out of place, sticking out of the anus. It may be a partial prolapse, where only part of the lining of the rectum slips out of the anus.  Or it may be a complete prolapse, where the entire wall of the rectum slips out. Many things increase your chance of having a rectal prolapse. Risk factors include:  · Straining during bowel movements. · Tissue damage from surgery or childbirth. · Weakness of pelvic floor muscles as people get older. Your doctor may have found the problem after asking questions about your symptoms and doing a rectal exam. Home treatment often helps the problem. Some people may need surgery. Follow-up care is a key part of your treatment and safety. Be sure to make and go to all appointments, and call your doctor if you are having problems. It's also a good idea to know your test results and keep a list of the medicines you take. How can you care for yourself at home? · Avoid constipation. Drink plenty of water, and eat fruits, vegetables, and other foods that contain fiber. Changes in diet often are enough to improve or reverse a partial prolapse. · Do Kegel exercises to help strengthen the muscles of the pelvic area. You do Kegel exercises by tightening the muscles you use when you urinate. · Don't strain during a bowel movement. Use a stool softener if you need to. · If it happens again, and if your doctor says it's okay, you can push the prolapse back into place. When should you call for help? Call your doctor now or seek immediate medical care if:  ? · The prolapse happens again. ? · You have new or worse pain. ? · You have new or worse bleeding from the rectum. ? Watch closely for changes in your health, and be sure to contact your doctor if:  ? · You cannot pass stools or gas. ? · You do not get better as expected. Where can you learn more? Go to http://charles-bernie.info/. Enter S990 in the search box to learn more about \"Rectal Prolapse: Care Instructions. \"  Current as of: May 12, 2017  Content Version: 11.4  © 7016-3037 Healthwise[employer].  Care instructions adapted under license by [employer] (which disclaims liability or warranty for this information). If you have questions about a medical condition or this instruction, always ask your healthcare professional. [place] [address] any warranty or liability for your use of this information.

## 2017-12-08 ENCOUNTER — HOSPITAL ENCOUNTER (EMERGENCY)
Age: 57
Discharge: HOME HEALTH | End: 2017-12-09
Attending: EMERGENCY MEDICINE
Payer: SUBSIDIZED

## 2017-12-08 ENCOUNTER — APPOINTMENT (OUTPATIENT)
Dept: GENERAL RADIOLOGY | Age: 57
End: 2017-12-08
Attending: NURSE PRACTITIONER
Payer: SUBSIDIZED

## 2017-12-08 DIAGNOSIS — F10.929 ALCOHOLIC INTOXICATION WITH COMPLICATION (HCC): ICD-10-CM

## 2017-12-08 DIAGNOSIS — R07.89 ATYPICAL CHEST PAIN: Primary | ICD-10-CM

## 2017-12-08 LAB
AMPHET UR QL SCN: NEGATIVE
ANION GAP SERPL CALC-SCNC: 8 MMOL/L (ref 3–18)
APPEARANCE UR: CLEAR
BARBITURATES UR QL SCN: POSITIVE
BASOPHILS # BLD: 0 K/UL (ref 0–0.06)
BASOPHILS NFR BLD: 0 % (ref 0–2)
BENZODIAZ UR QL: NEGATIVE
BILIRUB UR QL: NEGATIVE
BUN SERPL-MCNC: 3 MG/DL (ref 7–18)
BUN/CREAT SERPL: 7 (ref 12–20)
CALCIUM SERPL-MCNC: 8.4 MG/DL (ref 8.5–10.1)
CANNABINOIDS UR QL SCN: NEGATIVE
CHLORIDE SERPL-SCNC: 99 MMOL/L (ref 100–108)
CO2 SERPL-SCNC: 30 MMOL/L (ref 21–32)
COCAINE UR QL SCN: NEGATIVE
COLOR UR: YELLOW
CREAT SERPL-MCNC: 0.45 MG/DL (ref 0.6–1.3)
DIFFERENTIAL METHOD BLD: ABNORMAL
EOSINOPHIL # BLD: 0.1 K/UL (ref 0–0.4)
EOSINOPHIL NFR BLD: 1 % (ref 0–5)
ERYTHROCYTE [DISTWIDTH] IN BLOOD BY AUTOMATED COUNT: 12.6 % (ref 11.6–14.5)
ETHANOL SERPL-MCNC: 310 MG/DL (ref 0–3)
GLUCOSE SERPL-MCNC: 142 MG/DL (ref 74–99)
GLUCOSE UR STRIP.AUTO-MCNC: NEGATIVE MG/DL
HCT VFR BLD AUTO: 40.3 % (ref 35–45)
HDSCOM,HDSCOM: ABNORMAL
HGB BLD-MCNC: 14 G/DL (ref 12–16)
HGB UR QL STRIP: NEGATIVE
INR PPP: 0.9 (ref 0.8–1.2)
KETONES UR QL STRIP.AUTO: NEGATIVE MG/DL
LEUKOCYTE ESTERASE UR QL STRIP.AUTO: NEGATIVE
LYMPHOCYTES # BLD: 1.6 K/UL (ref 0.9–3.6)
LYMPHOCYTES NFR BLD: 25 % (ref 21–52)
MCH RBC QN AUTO: 33.7 PG (ref 24–34)
MCHC RBC AUTO-ENTMCNC: 34.7 G/DL (ref 31–37)
MCV RBC AUTO: 97.1 FL (ref 74–97)
METHADONE UR QL: NEGATIVE
MONOCYTES # BLD: 0.5 K/UL (ref 0.05–1.2)
MONOCYTES NFR BLD: 8 % (ref 3–10)
NEUTS SEG # BLD: 4.2 K/UL (ref 1.8–8)
NEUTS SEG NFR BLD: 66 % (ref 40–73)
NITRITE UR QL STRIP.AUTO: NEGATIVE
OPIATES UR QL: NEGATIVE
PCP UR QL: NEGATIVE
PH UR STRIP: 6 [PH] (ref 5–8)
PLATELET # BLD AUTO: 463 K/UL (ref 135–420)
PMV BLD AUTO: 10.1 FL (ref 9.2–11.8)
POTASSIUM SERPL-SCNC: 3.3 MMOL/L (ref 3.5–5.5)
PROT UR STRIP-MCNC: NEGATIVE MG/DL
PROTHROMBIN TIME: 11.5 SEC (ref 11.5–15.2)
RBC # BLD AUTO: 4.15 M/UL (ref 4.2–5.3)
SODIUM SERPL-SCNC: 137 MMOL/L (ref 136–145)
SP GR UR REFRACTOMETRY: 1.01 (ref 1–1.03)
UROBILINOGEN UR QL STRIP.AUTO: 0.2 EU/DL (ref 0.2–1)
WBC # BLD AUTO: 6.3 K/UL (ref 4.6–13.2)

## 2017-12-08 PROCEDURE — 71010 XR CHEST PORT: CPT

## 2017-12-08 PROCEDURE — 85610 PROTHROMBIN TIME: CPT | Performed by: NURSE PRACTITIONER

## 2017-12-08 PROCEDURE — 80307 DRUG TEST PRSMV CHEM ANLYZR: CPT | Performed by: NURSE PRACTITIONER

## 2017-12-08 PROCEDURE — 93005 ELECTROCARDIOGRAM TRACING: CPT

## 2017-12-08 PROCEDURE — 99285 EMERGENCY DEPT VISIT HI MDM: CPT

## 2017-12-08 PROCEDURE — 80048 BASIC METABOLIC PNL TOTAL CA: CPT | Performed by: NURSE PRACTITIONER

## 2017-12-08 PROCEDURE — 74011250637 HC RX REV CODE- 250/637

## 2017-12-08 PROCEDURE — 81003 URINALYSIS AUTO W/O SCOPE: CPT | Performed by: NURSE PRACTITIONER

## 2017-12-08 PROCEDURE — 85025 COMPLETE CBC W/AUTO DIFF WBC: CPT | Performed by: NURSE PRACTITIONER

## 2017-12-08 PROCEDURE — 82550 ASSAY OF CK (CPK): CPT | Performed by: NURSE PRACTITIONER

## 2017-12-08 RX ORDER — NITROGLYCERIN 0.4 MG/1
TABLET SUBLINGUAL
Status: COMPLETED
Start: 2017-12-08 | End: 2017-12-08

## 2017-12-08 RX ORDER — NITROGLYCERIN 0.4 MG/1
0.4 TABLET SUBLINGUAL
Status: COMPLETED | OUTPATIENT
Start: 2017-12-08 | End: 2017-12-08

## 2017-12-08 RX ADMIN — NITROGLYCERIN 0.4 MG: 0.4 TABLET SUBLINGUAL at 22:33

## 2017-12-09 VITALS
HEIGHT: 69 IN | OXYGEN SATURATION: 96 % | DIASTOLIC BLOOD PRESSURE: 72 MMHG | TEMPERATURE: 97.8 F | BODY MASS INDEX: 17.77 KG/M2 | WEIGHT: 120 LBS | SYSTOLIC BLOOD PRESSURE: 113 MMHG | HEART RATE: 82 BPM | RESPIRATION RATE: 20 BRPM

## 2017-12-09 LAB
ATRIAL RATE: 72 BPM
CALCULATED P AXIS, ECG09: 63 DEGREES
CALCULATED R AXIS, ECG10: 57 DEGREES
CALCULATED T AXIS, ECG11: 70 DEGREES
CK MB CFR SERPL CALC: 0.9 % (ref 0–4)
CK MB SERPL-MCNC: 1.3 NG/ML (ref 5–25)
CK SERPL-CCNC: 137 U/L (ref 26–192)
DIAGNOSIS, 93000: NORMAL
ETHANOL SERPL-MCNC: 183 MG/DL (ref 0–3)
P-R INTERVAL, ECG05: 134 MS
Q-T INTERVAL, ECG07: 478 MS
QRS DURATION, ECG06: 82 MS
QTC CALCULATION (BEZET), ECG08: 523 MS
TROPONIN I BLD-MCNC: <0.04 NG/ML (ref 0–0.08)
TROPONIN I SERPL-MCNC: <0.02 NG/ML (ref 0–0.04)
VENTRICULAR RATE, ECG03: 72 BPM

## 2017-12-09 PROCEDURE — 84484 ASSAY OF TROPONIN QUANT: CPT

## 2017-12-09 PROCEDURE — 80307 DRUG TEST PRSMV CHEM ANLYZR: CPT | Performed by: NURSE PRACTITIONER

## 2017-12-09 RX ORDER — IBUPROFEN 600 MG/1
600 TABLET ORAL
Qty: 20 TAB | Refills: 0 | Status: SHIPPED | OUTPATIENT
Start: 2017-12-09 | End: 2018-04-19 | Stop reason: ALTCHOICE

## 2017-12-09 NOTE — ED PROVIDER NOTES
HPI Comments: Scott Welsh is a 61year old female who presents to the ED with a c/o left sided chest pain. Pt states this has been painful all day. She has not self medicated, but she has been drinking one adult beverage. She denies cardiac problems in the past.  She called [facility] to transport her here to the ED, and they re(ported two runs of rate controlled a fib en route. They gave the Pt 325 mg of aspirin. Pt rates her pain as a 8:10. No family Hx of MI. Pt states she does smoke cigarettes. Patient is a 61 y.o. female presenting with chest pain. The history is provided by the patient. History limited by: No communication barrier. Chest Pain (Angina)    This is a new problem. The current episode started 6 to 12 hours ago. The problem has not changed since onset. The problem occurs constantly. The pain is associated with normal activity. Pain location: Left sided chest wall. Past Medical History:   Diagnosis Date    Alcohol abuse     Anxiety     Chronic obstructive pulmonary disease ([facility] [address]     Depression     Essential hypertension     Hypertension     Psychiatric disorder     Tobacco abuse        Past Surgical History:   Procedure Laterality Date    HX GYN               Family History:   Problem Relation Age of Onset    Diabetes Mother     Hypertension Mother     Heart Attack Mother     Hypertension Father     Diabetes Brother        Social History     Social History    Marital status:      Spouse name: N/A    Number of children: N/A    Years of education: N/A     Occupational History    Not on file.      Social History Main Topics    Smoking status: Current Every Day Smoker     Packs/day: 0.50    Smokeless tobacco: Never Used    Alcohol use Yes      Comment: regularly    Drug use: No    Sexual activity: Not on file     Other Topics Concern    Not on file     Social History Narrative         ALLERGIES: Codeine    Review of Systems Constitutional: Negative. HENT: Negative. Eyes: Negative. Respiratory: Negative. Cardiovascular: Positive for chest pain. Gastrointestinal: Negative. Endocrine: Negative. Genitourinary: Negative. Musculoskeletal: Negative. Skin: Negative. Allergic/Immunologic: Negative. Neurological: Negative. Hematological: Negative. Psychiatric/Behavioral: Negative. Vitals:    12/08/17 2213 12/08/17 2218   BP:  150/80   Pulse: 76    Resp: 18    Temp: 97.8 °F (36.6 °C)    SpO2: 99%    Weight: 54.4 kg (120 lb)    Height: 5' 9\" (1.753 m)             Physical Exam   Constitutional: She is oriented to person, place, and time. She appears well-developed and well-nourished. No distress. HENT:   Head: Normocephalic and atraumatic. Eyes: EOM are normal. Pupils are equal, round, and reactive to light. Neck: Normal range of motion. Neck supple. Cardiovascular: Normal rate, regular rhythm and normal heart sounds. Pulmonary/Chest: Effort normal and breath sounds normal. No respiratory distress. She has no wheezes. She has no rales. Pain is reproducible by palpation to the left chest wall. Abdominal: Soft. Bowel sounds are normal. There is no tenderness. There is no rebound. Genitourinary:   Genitourinary Comments: NE   Musculoskeletal: Normal range of motion. Neurological: She is alert and oriented to person, place, and time. No cranial nerve deficit. Coordination normal.   Skin: Skin is warm and dry. Psychiatric: She has a normal mood and affect. Nursing note and vitals reviewed. MDM  Number of Diagnoses or Management Options  Alcoholic intoxication with complication [facility]):    Atypical chest pain:      Amount and/or Complexity of Data Reviewed  Clinical lab tests: ordered and reviewed  Tests in the radiology section of CPT®: ordered and reviewed  Discuss the patient with other providers: yes  Independent visualization of images, tracings, or specimens: yes    Risk of Complications, Morbidity, and/or Mortality  Presenting problems: moderate  Diagnostic procedures: moderate  Management options: moderate    Patient Progress  Patient progress: stable    ED Course       Procedures    Diagnosis:   1. Atypical chest pain    2. Alcoholic intoxication with complication ([address]          Disposition:   discharged to home. Follow-up Information     Follow up With Details Comments Contact Info    CINDA Jerry MD Call on Monday for a follow up appointment. [place] [id number] and [address] [place] [address]  [phone]            Patient's Medications   Start Taking    IBUPROFEN (MOTRIN) 600 MG TABLET    Take 1 Tab by mouth every six (6) hours as needed for Pain. Continue Taking    No medications on file   These Medications have changed    No medications on file   Stop Taking    HYDROCORTISONE (ANUSOL-HC) 25 MG SUPP    Insert 1 Suppository into rectum every twelve (12) hours.

## 2017-12-09 NOTE — DISCHARGE INSTRUCTIONS
[facility] Activation    Thank you for requesting access to [facility] [facility]. Please follow the instructions below to securely access and download your online medical record. [facility] [facility] allows you to send messages to your doctor, view your test results, renew your prescriptions, schedule appointments, and more. How Do I Sign Up? 1. In your internet browser, go to [email or web address]  2. Click on the First Time User? Click Here link in the Sign In box. You will be redirect to the New Member Sign Up page. 3. Enter your [employer] [facility] Access Code exactly as it appears below. You will not need to use this code after youve completed the sign-up process. If you do not sign up before the expiration date, you must request a new code. [employer] [facility] Access Code: [email or web address]  Expires: 2017 12:23 AM (This is the date your [employer] [facility] access code will )    4. Enter the last four digits of your Social Security Number (xxxx) and Date of Birth (mm/dd/yyyy) as indicated and click Submit. You will be taken to the next sign-up page. 5. Create a [facility] [facility] ID. This will be your [facility] [facility] login ID and cannot be changed, so think of one that is secure and easy to remember. 6. Create a [employer] [facility] password. You can change your password at any time. 7. Enter your Password Reset Question and Answer. This can be used at a later time if you forget your password. 8. Enter your e-mail address. You will receive e-mail notification when new information is available in [address]. 9. Click Sign Up. You can now view and download portions of your medical record. 10. Click the Download Summary menu link to download a portable copy of your medical information. Additional Information    If you have questions, please visit the Frequently Asked Questions section of the [employer] website at https://[email or web address]. [employer]. [employer]/. Remember, [employer] [facility] is NOT to be used for urgent needs. For medical emergencies, dial 911.

## 2017-12-09 NOTE — ED TRIAGE NOTES
Pt arrives via ems from home  Pt awoken out of sleep by chest pain radiating to back and sob  Non compliant with all medications  Had \"alcohol mixed with an energy drink last night'  Per ems EKG en route showed A-Fib no hx of afib per pt

## 2018-04-19 ENCOUNTER — OFFICE VISIT (OUTPATIENT)
Dept: FAMILY MEDICINE CLINIC | Age: 58
End: 2018-04-19

## 2018-04-19 VITALS
OXYGEN SATURATION: 98 % | SYSTOLIC BLOOD PRESSURE: 155 MMHG | WEIGHT: 113 LBS | HEART RATE: 89 BPM | RESPIRATION RATE: 18 BRPM | TEMPERATURE: 98.3 F | DIASTOLIC BLOOD PRESSURE: 98 MMHG | HEIGHT: 69 IN | BODY MASS INDEX: 16.74 KG/M2

## 2018-04-19 DIAGNOSIS — J44.9 CHRONIC OBSTRUCTIVE PULMONARY DISEASE, UNSPECIFIED COPD TYPE (HCC): ICD-10-CM

## 2018-04-19 DIAGNOSIS — M54.9 CHRONIC UPPER BACK PAIN: ICD-10-CM

## 2018-04-19 DIAGNOSIS — K62.3 RECTAL PROLAPSE: ICD-10-CM

## 2018-04-19 DIAGNOSIS — F10.10 ALCOHOL ABUSE: ICD-10-CM

## 2018-04-19 DIAGNOSIS — G89.29 CHRONIC UPPER BACK PAIN: ICD-10-CM

## 2018-04-19 DIAGNOSIS — F41.9 ANXIETY: ICD-10-CM

## 2018-04-19 DIAGNOSIS — I10 ESSENTIAL HYPERTENSION: ICD-10-CM

## 2018-04-19 DIAGNOSIS — Z72.0 TOBACCO ABUSE: Primary | ICD-10-CM

## 2018-04-19 RX ORDER — CYCLOBENZAPRINE HCL 10 MG
TABLET ORAL
Qty: 30 TAB | Refills: 1
Start: 2018-04-19 | End: 2018-06-27 | Stop reason: SDUPTHER

## 2018-04-19 RX ORDER — LORAZEPAM 0.5 MG/1
0.5 TABLET ORAL
Qty: 20 TAB | Refills: 0
Start: 2018-04-19 | End: 2018-05-18

## 2018-04-19 RX ORDER — DICLOFENAC SODIUM 75 MG/1
75 TABLET, DELAYED RELEASE ORAL 2 TIMES DAILY
Qty: 60 TAB | Refills: 2
Start: 2018-04-19 | End: 2018-06-27 | Stop reason: SDUPTHER

## 2018-04-19 RX ORDER — FLUOXETINE HYDROCHLORIDE 20 MG/1
20 CAPSULE ORAL DAILY
Qty: 30 CAP | Refills: 1
Start: 2018-04-19 | End: 2018-05-17 | Stop reason: DRUGHIGH

## 2018-04-19 NOTE — PATIENT INSTRUCTIONS
Healthy Upper Back: Exercises  Your Care Instructions  Here are some examples of exercises for your upper back. Start each exercise slowly. Ease off the exercise if you start to have pain. Your doctor or physical therapist will tell you when you can start these exercises and which ones will work best for you. How to do the exercises  Lower neck and upper back stretch    1. Stretch your arms out in front of your body. Clasp one hand on top of your other hand. 2. Gently reach out so that you feel your shoulder blades stretching away from each other. 3. Gently bend your head forward. 4. Hold for 15 to 30 seconds. 5. Repeat 2 to 4 times. Midback stretch    If you have knee pain, do not do this exercise. 1. Kneel on the floor, and sit back on your ankles. 2. Lean forward, place your hands on the floor, and stretch your arms out in front of you. Rest your head between your arms. 3. Gently push your chest toward the floor, reaching as far in front of you as possible. 4. Hold for 15 to 30 seconds. 5. Repeat 2 to 4 times. Shoulder rolls    1. Sit comfortably with your feet shoulder-width apart. You can also do this exercise while standing. 2. Roll your shoulders up, then back, and then down in a smooth, circular motion. 3. Repeat 2 to 4 times. Wall push-up    1. Stand against a wall with your feet about 12 to 24 inches back from the wall. If you feel any pain when you do this exercise, stand closer to the wall. 2. Place your hands on the wall slightly wider apart than your shoulders, and lean forward. 3. Gently lean your body toward the wall. Then push back to your starting position. Keep the motion smooth and controlled. 4. Repeat 8 to 12 times. Resisted shoulder blade squeeze    For this exercise, you will need elastic exercise material, such as surgical tubing or Thera-Band. 1. Sit or stand, holding the band in both hands in front of you.  Keep your elbows close to your sides, bent at a 90-degree angle. Your palms should face up. 2. Squeeze your shoulder blades together, and move your arms to the outside, stretching the band. Be sure to keep your elbows at your sides while you do this. 3. Relax. 4. Repeat 8 to 12 times. Resisted rows    For this exercise, you will need elastic exercise material, such as surgical tubing or Thera-Band. 1. Put the band around a solid object, such as a bedpost, at about waist level. Hold one end of the band in each hand. 2. With your elbows at your sides and bent to 90 degrees, pull the band back to move your shoulder blades toward each other. Return to the starting position. 3. Repeat 8 to 12 times. Follow-up care is a key part of your treatment and safety. Be sure to make and go to all appointments, and call your doctor if you are having problems. It's also a good idea to know your test results and keep a list of the medicines you take. Where can you learn more? Go to http://charles-bernie.info/. Enter Q293 in the search box to learn more about \"Healthy Upper Back: Exercises. \"  Current as of: March 21, 2017  Content Version: 11.4  © 5641-1926 Healthwise, Incorporated. Care instructions adapted under license by [employer] (which disclaims liability or warranty for this information). If you have questions about a medical condition or this instruction, always ask your healthcare professional. [place] [address] any warranty or liability for your use of this information.

## 2018-04-19 NOTE — PROGRESS NOTES
HPI  Richa Carl is a 60 y.o. female being seen today for   Chief Complaint   Patient presents with    Anxiety     \"pt stated that she been doing lots of crying \"    Weight Loss   . IOV for this pt to care a Caitlyn Lock.   she states that she used to be on wellbutrin and effexor for depression but has not been on in years. Recently was evicted and is now living at [facility]. She is having anxiety attacks and depression. Denies lethal ideation. Chart history of alcohol abuse but pt states she is no longer drinking. Has long history of upper back pain. Worse lately. No injury. Also chart history of rectal prolapse and copd. Past Medical History:   Diagnosis Date    Alcohol abuse     Anxiety     Chronic obstructive pulmonary disease ([address]     Depression     Essential hypertension     Hypertension     Psychiatric disorder     Tobacco abuse          ROS  Patient states that she is feeling well. Denies complaints of chest pain, shortness of breath, swelling of legs, dizziness or weakness. she denies nausea, vomiting or diarrhea. Current Outpatient Prescriptions   Medication Sig    FLUoxetine (PROZAC) 20 mg capsule Take 1 Cap by mouth daily.  cyclobenzaprine (FLEXERIL) 10 mg tablet 1/2 or one tid prn    LORazepam (ATIVAN) 0.5 mg tablet Take 1 Tab by mouth every eight (8) hours as needed for Anxiety. Max Daily Amount: 1.5 mg.    diclofenac EC (VOLTAREN) 75 mg EC tablet Take 1 Tab by mouth two (2) times a day. No current facility-administered medications for this visit. PE  Visit Vitals    BP (!) 155/98 (BP 1 Location: Left arm, BP Patient Position: Sitting)    Pulse 89    Temp 98.3 °F (36.8 °C) (Oral)    Resp 18    Ht 5' 9\" (1.753 m)    Wt 113 lb (51.3 kg)    SpO2 98%    BMI 16.69 kg/m2        Alert and oriented with normal mood and affect. she is well developed and well nourished . Lungs are clear without wheezing.  Heart rate is regular without murmurs or gallops. There is no lower extremity edema. Some TTP between shoulderblades but none over spine. Assessment and Plan:        ICD-10-CM ICD-9-CM    1. Tobacco abuse  Address at future visit Z72.0 305.1    2. Alcohol abuse  Abstinent at this time. Monitor for relapse F10.10 305.00    3. Essential hypertension  Recheck on next visit I10 401.9    4. Chronic obstructive pulmonary disease, unspecified COPD type ([facility] J44.9 496    5. Anxiety  Trial prozac 20 (chosen for cost)  Short term rx ativan. She understads we cannot refill    CSB info F41.9 300.00 LORazepam (ATIVAN) 0.5 mg tablet   6. Upper back pain. Instructed in HEP. Trial nsaid and flexeril. Caution sedation  7.     Rectal prolapse         Referral to colo rectal    Follow up in a month recheck      Victorina Roblero MD

## 2018-04-19 NOTE — PROGRESS NOTES
Unable to print After Visit Summary for this encounter. I have reviewed discharge instructions with the patient. The patient verbalized understanding. Guidance given regarding new medication(s) this visit, including reason for taking medicine, common side effects, and pharmacy medication was sent to if not printed. Patient given self referral information for [employer].

## 2018-04-20 NOTE — PROGRESS NOTES
Application for [employer] provided to patient during encounter. Case created in [place] for follow up.

## 2018-04-23 ENCOUNTER — OFFICE VISIT (OUTPATIENT)
Dept: SURGERY | Age: 58
End: 2018-04-23

## 2018-04-23 VITALS
OXYGEN SATURATION: 99 % | RESPIRATION RATE: 16 BRPM | HEART RATE: 76 BPM | DIASTOLIC BLOOD PRESSURE: 66 MMHG | HEIGHT: 69 IN | TEMPERATURE: 98.3 F | BODY MASS INDEX: 17.77 KG/M2 | WEIGHT: 120 LBS | SYSTOLIC BLOOD PRESSURE: 141 MMHG

## 2018-04-23 DIAGNOSIS — K62.3 RECTAL PROLAPSE: Primary | ICD-10-CM

## 2018-04-23 NOTE — PROGRESS NOTES
[facility]  Colon and Rectal Surgery  [address] [address], [address] [place] [address]              Colon and Rectal Surgery        Patient: Daija Horn  MRN: [id number]  Date: 2018     Age:  60 y.o.,      Sex: female    YOB: 1960      Subjective    Ms. Johnie Prader is an 60 y.o. female referred by Dr. Yanni Driscoll. Her current symptoms include prolapsing mass with just about every bowel movements. Usually the prolapse will reduce spontaneously, but on several occasions, she did have to manually reduce it. She will also notice bright red blood on tissue as well as frequent mucus discharges per rectum.  reports symptoms have presented for 1 year. She has not had previous rectal surgery.  denies associated fever. A history of inflammatory bowel disease has not been reported. The patient does have issue with chronic constipation. Bowel habits are reported as often hard and about 3-4 times per week. The patient denies any significant anorectal pain, change in bowel habits, weight changes, nor any abdominal pain. Patient denies vomiting, diarrhea, reflux and nausea. The family history is negative for colon cancer/polyps, other GI malignancies, nor inflammatory bowel diseases. Colonoscopy has not been performed to date. Past Medical History:   Diagnosis Date    Alcohol abuse     Anxiety     Chronic obstructive pulmonary disease ([address]     Depression     Essential hypertension     Hypertension     Psychiatric disorder     Tobacco abuse        Past Surgical History:   Procedure Laterality Date    HX GYN             Allergies   Allergen Reactions    Codeine Nausea and Vomiting       Prior to Admission medications    Medication Sig Start Date End Date Taking? Authorizing Provider   FLUoxetine (PROZAC) 20 mg capsule Take 1 Cap by mouth daily.  18  Yes Fabian Taylor MD   cyclobenzaprine (FLEXERIL) 10 mg tablet 1/2 or one tid prn 4/19/18  Yes Alba Ramirez MD   LORazepam (ATIVAN) 0.5 mg tablet Take 1 Tab by mouth every eight (8) hours as needed for Anxiety. Max Daily Amount: 1.5 mg. 4/19/18  Yes Alba Ramirez MD   diclofenac EC (VOLTAREN) 75 mg EC tablet Take 1 Tab by mouth two (2) times a day. 4/19/18  Yes Alba Ramirez MD       Current Outpatient Prescriptions   Medication Sig Dispense Refill    FLUoxetine (PROZAC) 20 mg capsule Take 1 Cap by mouth daily. 30 Cap 1    cyclobenzaprine (FLEXERIL) 10 mg tablet 1/2 or one tid prn 30 Tab 1    LORazepam (ATIVAN) 0.5 mg tablet Take 1 Tab by mouth every eight (8) hours as needed for Anxiety. Max Daily Amount: 1.5 mg. 20 Tab 0    diclofenac EC (VOLTAREN) 75 mg EC tablet Take 1 Tab by mouth two (2) times a day. 61 Tab 2       Social History     Social History    Marital status:      Spouse name: N/A    Number of children: N/A    Years of education: N/A     Occupational History    Not on file. Social History Main Topics    Smoking status: Current Every Day Smoker     Packs/day: 0.50    Smokeless tobacco: Never Used    Alcohol use Yes      Comment: regularly    Drug use: No    Sexual activity: Not on file     Other Topics Concern    Not on file     Social History Narrative       Family History   Problem Relation Age of Onset    Diabetes Mother     Hypertension Mother     Heart Attack Mother     Hypertension Father     Diabetes Brother            Review of Systems:    A comprehensive review of systems was negative except for: Behvioral/Psych: positive for anxiety and depression    Objective:        Visit Vitals    /66    Pulse 76    Temp 98.3 °F (36.8 °C) (Oral)    Resp 16    Ht 5' 9\" (1.753 m)    Wt 54.4 kg (120 lb)    SpO2 99%    BMI 17.72 kg/m2       Physical Exam:   GENERAL: alert, cooperative, no distress, appears stated age  LUNG: clear to auscultation bilaterally  HEART: regular rate and rhythm  ABDOMEN: soft, non-tender.  Bowel sounds normal. No masses,  no organomegaly  EXTREMITIES:  extremities normal, atraumatic, no cyanosis or edema     Anorectal:  With the patient in the prone position the anus appeared within normal limits. Digital rectal examination revealed somewhat decreased sphincter tone and squeeze pressure. Palpation revealed No Masses. Anoscopy revealed the findings of mildly inflamed distal rectal mucosa consistent with prolapse. Next with the patient straining in the kimode, she was able to produce the rectal prolapse (5 cm in length). Assessment / Plan    Ms. Kinza Hatch is an 60 y.o. female with rectal prolapse. She is also in need of a screening colonoscopy exam.     I discussed the treatment options with the patient carefully. Given the nature of the disease, I explained that surgical management will be required. She does have significant risk factors for surgery including history of severe alcohol abuse (patient states that has been sober for the past month) and COPD due to tobacco abuse. She will need to be reassessed for these risk factors prior to surgery. Therefore, I recommended proceed with a full colonoscopy exam mainly for screening purposes but also for diagnostic reasons. The patient was in full agreement. The risks of colonoscopy were discussed including colon injury/perforation, anesthesia issues, bleeding, and the possibility of incomplete examination. The patient was willing to accept these risks and proceed with the examination. Thank you for allowing me to participate in the patient's care.             Everardo Burgess MD, FACS, [facility]  Colon and Rectal Surgery  [facility]  Office [phone]  Fax     [phone]  4/23/2018  1:43 PM

## 2018-04-26 ENCOUNTER — TELEPHONE (OUTPATIENT)
Dept: SURGERY | Age: 58
End: 2018-04-26

## 2018-04-26 RX ORDER — POLYETHYLENE GLYCOL 3350, SODIUM SULFATE ANHYDROUS, SODIUM BICARBONATE, SODIUM CHLORIDE, POTASSIUM CHLORIDE 236; 22.74; 6.74; 5.86; 2.97 G/4L; G/4L; G/4L; G/4L; G/4L
POWDER, FOR SOLUTION ORAL
Qty: 1 BOTTLE | Refills: 0 | Status: SHIPPED | OUTPATIENT
Start: 2018-04-26 | End: 2018-05-11

## 2018-05-11 ENCOUNTER — HOSPITAL ENCOUNTER (OUTPATIENT)
Age: 58
Setting detail: OUTPATIENT SURGERY
Discharge: HOME | End: 2018-05-11
Attending: COLON & RECTAL SURGERY | Admitting: COLON & RECTAL SURGERY
Payer: SUBSIDIZED

## 2018-05-11 VITALS
HEIGHT: 69 IN | BODY MASS INDEX: 18.28 KG/M2 | WEIGHT: 123.44 LBS | RESPIRATION RATE: 17 BRPM | SYSTOLIC BLOOD PRESSURE: 124 MMHG | HEART RATE: 79 BPM | OXYGEN SATURATION: 98 % | TEMPERATURE: 97.1 F | DIASTOLIC BLOOD PRESSURE: 71 MMHG

## 2018-05-11 PROCEDURE — 99153 MOD SED SAME PHYS/QHP EA: CPT | Performed by: COLON & RECTAL SURGERY

## 2018-05-11 PROCEDURE — 77030031670 HC DEV INFL ENDOTEK BIG60 MRTM -B: Performed by: COLON & RECTAL SURGERY

## 2018-05-11 PROCEDURE — 77030009426 HC FCPS BIOP ENDOSC BSC -B: Performed by: COLON & RECTAL SURGERY

## 2018-05-11 PROCEDURE — 88305 TISSUE EXAM BY PATHOLOGIST: CPT | Performed by: COLON & RECTAL SURGERY

## 2018-05-11 PROCEDURE — 74011250636 HC RX REV CODE- 250/636: Performed by: COLON & RECTAL SURGERY

## 2018-05-11 PROCEDURE — 76040000019: Performed by: COLON & RECTAL SURGERY

## 2018-05-11 PROCEDURE — G0500 MOD SEDAT ENDO SERVICE >5YRS: HCPCS | Performed by: COLON & RECTAL SURGERY

## 2018-05-11 RX ORDER — NALOXONE HYDROCHLORIDE 0.4 MG/ML
0.4 INJECTION, SOLUTION INTRAMUSCULAR; INTRAVENOUS; SUBCUTANEOUS
Status: DISCONTINUED | OUTPATIENT
Start: 2018-05-11 | End: 2018-05-11 | Stop reason: HOSPADM

## 2018-05-11 RX ORDER — FLUMAZENIL 0.1 MG/ML
0.2 INJECTION INTRAVENOUS
Status: DISCONTINUED | OUTPATIENT
Start: 2018-05-11 | End: 2018-05-11 | Stop reason: HOSPADM

## 2018-05-11 RX ORDER — EPINEPHRINE 0.1 MG/ML
1 INJECTION INTRACARDIAC; INTRAVENOUS
Status: DISCONTINUED | OUTPATIENT
Start: 2018-05-11 | End: 2018-05-11 | Stop reason: HOSPADM

## 2018-05-11 RX ORDER — SODIUM CHLORIDE 9 MG/ML
50 INJECTION, SOLUTION INTRAVENOUS CONTINUOUS
Status: DISCONTINUED | OUTPATIENT
Start: 2018-05-11 | End: 2018-05-11 | Stop reason: HOSPADM

## 2018-05-11 RX ORDER — DEXTROMETHORPHAN/PSEUDOEPHED 2.5-7.5/.8
1.2 DROPS ORAL
Status: DISCONTINUED | OUTPATIENT
Start: 2018-05-11 | End: 2018-05-11 | Stop reason: HOSPADM

## 2018-05-11 RX ORDER — MIDAZOLAM HYDROCHLORIDE 1 MG/ML
.25-5 INJECTION, SOLUTION INTRAMUSCULAR; INTRAVENOUS
Status: DISCONTINUED | OUTPATIENT
Start: 2018-05-11 | End: 2018-05-11 | Stop reason: HOSPADM

## 2018-05-11 RX ORDER — ATROPINE SULFATE 0.1 MG/ML
0.5 INJECTION INTRAVENOUS
Status: DISCONTINUED | OUTPATIENT
Start: 2018-05-11 | End: 2018-05-11 | Stop reason: HOSPADM

## 2018-05-11 RX ORDER — SODIUM CHLORIDE 0.9 % (FLUSH) 0.9 %
5-10 SYRINGE (ML) INJECTION EVERY 8 HOURS
Status: DISCONTINUED | OUTPATIENT
Start: 2018-05-11 | End: 2018-05-11 | Stop reason: HOSPADM

## 2018-05-11 RX ORDER — SODIUM CHLORIDE 0.9 % (FLUSH) 0.9 %
5-10 SYRINGE (ML) INJECTION AS NEEDED
Status: DISCONTINUED | OUTPATIENT
Start: 2018-05-11 | End: 2018-05-11 | Stop reason: HOSPADM

## 2018-05-11 NOTE — INTERVAL H&P NOTE
H&P Update: Claudell Coral was seen and examined. History and physical has been reviewed. The patient has been examined.  There have been no significant clinical changes since the completion of the originally dated History and Physical.    Signed By: Catrina Quintero MD     May 11, 2018 10:33 AM

## 2018-05-11 NOTE — H&P (VIEW-ONLY)
[facility]  Colon and Rectal Surgery  [id number] [place] [address], [address] [place] [address]              Colon and Rectal Surgery        Patient: Ania Willams  MRN: [id number]  Date: 2018     Age:  60 y.o.,      Sex: female    YOB: 1960      Subjective    Ms. Fawad Kuhn is an 60 y.o. female referred by Dr. Emmett Suárez. Her current symptoms include prolapsing mass with just about every bowel movements. Usually the prolapse will reduce spontaneously, but on several occasions, she did have to manually reduce it. She will also notice bright red blood on tissue as well as frequent mucus discharges per rectum.  reports symptoms have presented for 1 year. She has not had previous rectal surgery.  denies associated fever. A history of inflammatory bowel disease has not been reported. The patient does have issue with chronic constipation. Bowel habits are reported as often hard and about 3-4 times per week. The patient denies any significant anorectal pain, change in bowel habits, weight changes, nor any abdominal pain. Patient denies vomiting, diarrhea, reflux and nausea. The family history is negative for colon cancer/polyps, other GI malignancies, nor inflammatory bowel diseases. Colonoscopy has not been performed to date. Past Medical History:   Diagnosis Date    Alcohol abuse     Anxiety     Chronic obstructive pulmonary disease ([address]     Depression     Essential hypertension     Hypertension     Psychiatric disorder     Tobacco abuse        Past Surgical History:   Procedure Laterality Date    HX GYN             Allergies   Allergen Reactions    Codeine Nausea and Vomiting       Prior to Admission medications    Medication Sig Start Date End Date Taking? Authorizing Provider   FLUoxetine (PROZAC) 20 mg capsule Take 1 Cap by mouth daily.  18  Yes Resa Castleman, MD   cyclobenzaprine (FLEXERIL) 10 mg tablet 1/2 or one tid prn 4/19/18  Yes Ricke Scheuermann, MD   LORazepam (ATIVAN) 0.5 mg tablet Take 1 Tab by mouth every eight (8) hours as needed for Anxiety. Max Daily Amount: 1.5 mg. 4/19/18  Yes Ricke Scheuermann, MD   diclofenac EC (VOLTAREN) 75 mg EC tablet Take 1 Tab by mouth two (2) times a day. 4/19/18  Yes Ricke Scheuermann, MD       Current Outpatient Prescriptions   Medication Sig Dispense Refill    FLUoxetine (PROZAC) 20 mg capsule Take 1 Cap by mouth daily. 30 Cap 1    cyclobenzaprine (FLEXERIL) 10 mg tablet 1/2 or one tid prn 30 Tab 1    LORazepam (ATIVAN) 0.5 mg tablet Take 1 Tab by mouth every eight (8) hours as needed for Anxiety. Max Daily Amount: 1.5 mg. 20 Tab 0    diclofenac EC (VOLTAREN) 75 mg EC tablet Take 1 Tab by mouth two (2) times a day. 61 Tab 2       Social History     Social History    Marital status:      Spouse name: N/A    Number of children: N/A    Years of education: N/A     Occupational History    Not on file. Social History Main Topics    Smoking status: Current Every Day Smoker     Packs/day: 0.50    Smokeless tobacco: Never Used    Alcohol use Yes      Comment: regularly    Drug use: No    Sexual activity: Not on file     Other Topics Concern    Not on file     Social History Narrative       Family History   Problem Relation Age of Onset    Diabetes Mother     Hypertension Mother     Heart Attack Mother     Hypertension Father     Diabetes Brother            Review of Systems:    A comprehensive review of systems was negative except for: Behvioral/Psych: positive for anxiety and depression    Objective:        Visit Vitals    /66    Pulse 76    Temp 98.3 °F (36.8 °C) (Oral)    Resp 16    Ht 5' 9\" (1.753 m)    Wt 54.4 kg (120 lb)    SpO2 99%    BMI 17.72 kg/m2       Physical Exam:   GENERAL: alert, cooperative, no distress, appears stated age  LUNG: clear to auscultation bilaterally  HEART: regular rate and rhythm  ABDOMEN: soft, non-tender.  Bowel sounds normal. No masses,  no organomegaly  EXTREMITIES:  extremities normal, atraumatic, no cyanosis or edema     Anorectal:  With the patient in the prone position the anus appeared within normal limits. Digital rectal examination revealed somewhat decreased sphincter tone and squeeze pressure. Palpation revealed No Masses. Anoscopy revealed the findings of mildly inflamed distal rectal mucosa consistent with prolapse. Next with the patient straining in the kimode, she was able to produce the rectal prolapse (5 cm in length). Assessment / Plan    Ms. Nicole Brice is an 60 y.o. female with rectal prolapse. She is also in need of a screening colonoscopy exam.     I discussed the treatment options with the patient carefully. Given the nature of the disease, I explained that surgical management will be required. She does have significant risk factors for surgery including history of severe alcohol abuse (patient states that has been sober for the past month) and COPD due to tobacco abuse. She will need to be reassessed for these risk factors prior to surgery. Therefore, I recommended proceed with a full colonoscopy exam mainly for screening purposes but also for diagnostic reasons. The patient was in full agreement. The risks of colonoscopy were discussed including colon injury/perforation, anesthesia issues, bleeding, and the possibility of incomplete examination. The patient was willing to accept these risks and proceed with the examination. Thank you for allowing me to participate in the patient's care.             Maricel Cleveland MD, FACS, [facility]  Colon and Rectal Surgery  [facility]  Office [phone]  Fax     [phone]  4/23/2018  1:43 PM

## 2018-05-11 NOTE — DISCHARGE INSTRUCTIONS
Colonoscopy Discharge Instructions       John Rojas  [id number]  1960      COLONOSCOPY FINDINGS:  Your colonoscopy showed: 1. Findings consistent with rectal prolapse. 2. One colon polyp which was completely removed. 3. Mild diverticulosis of the colon. FOLLOW UP RECOMMENDATIONS:   Dr. Prashant Ziegler will contact you with your results. Dr. Prashant Ziegler recommends follow up in clinic next week to discuss and schedule surgery for the rectal prolapse. Dr. Prashant Ziegler will recommend your next colonoscopy after the Pathology results are available. DISCOMFORT:  If you have redness at your IV site- apply warm compress to area; if redness or soreness persist- contact your physician  There may be a slight amount of blood passed from the rectum, more than a teaspoon of bright red blood is not expected - contact your physician  Gaseous discomfort is common- walking, belching will help relieve any gas pains. If discomfort persist- contact your physician    DIET:   High fiber diet. ACTIVITY:  You may resume your normal daily activities, however, it is recommended that you spend the remainder of the day resting - avoiding any strenuous activities. You may not operate a vehicle for 24 hours  You may not engage in an occupation involving machinery or appliances for rest of today  You may not drink alcoholic beverages for at least 24 hours  Avoid making any critical decisions for at least 24 hour    CALL M.D.   ANY SIGN OF:   Increasing pain, nausea, vomiting  Abdominal distension (swelling)  New increased bleeding   Fever or chills  Pain in chest area or shortness of breath      Jenn Rico MD, FACS, FASCRS  Colon and Rectal Surgery  [facility]  Office [phone]  Fax     [phone] SUMMARY from Nurse    PATIENT INSTRUCTIONS:    After general anesthesia or intravenous sedation, for 24 hours or while taking prescription Narcotics:  · Limit your activities  · Do not drive and operate hazardous machinery  · Do not make important personal or business decisions  · Do  not drink alcoholic beverages  · If you have not urinated within 8 hours after discharge, please contact your surgeon on call. Report the following to your surgeon:  · Excessive pain, swelling, redness or odor of or around the surgical area  · Temperature over 100.5  · Nausea and vomiting lasting longer than 4 hours or if unable to take medications  · Any signs of decreased circulation or nerve impairment to extremity: change in color, persistent  numbness, tingling, coldness or increase pain  · Any questions    What to do at Home:  . These are general instructions for a healthy lifestyle:    No smoking/ No tobacco products/ Avoid exposure to second hand smoke  Surgeon General's Warning:  Quitting smoking now greatly reduces serious risk to your health. Obesity, smoking, and sedentary lifestyle greatly increases your risk for illness    A healthy diet, regular physical exercise & weight monitoring are important for maintaining a healthy lifestyle    You may be retaining fluid if you have a history of heart failure or if you experience any of the following symptoms:  Weight gain of 3 pounds or more overnight or 5 pounds in a week, increased swelling in our hands or feet or shortness of breath while lying flat in bed. Please call your doctor as soon as you notice any of these symptoms; do not wait until your next office visit. Recognize signs and symptoms of STROKE:    F-face looks uneven    A-arms unable to move or move unevenly    S-speech slurred or non-existent    T-time-call 911 as soon as signs and symptoms begin-DO NOT go       Back to bed or wait to see if you get better-TIME IS BRAIN. Warning Signs of HEART ATTACK     Call 911 if you have these symptoms:   Chest discomfort.  Most heart attacks involve discomfort in the center of the chest that lasts more than a few minutes, or that goes away and comes back. It can feel like uncomfortable pressure, squeezing, fullness, or pain.  Discomfort in other areas of the upper body. Symptoms can include pain or discomfort in one or both arms, the back, neck, jaw, or stomach.  Shortness of breath with or without chest discomfort.  Other signs may include breaking out in a cold sweat, nausea, or lightheadedness. Don't wait more than five minutes to call 911 - MINUTES MATTER! Fast action can save your life. Calling 911 is almost always the fastest way to get lifesaving treatment. Emergency Medical Services staff can begin treatment when they arrive -- up to an hour sooner than if someone gets to the hospital by car. The discharge information has been reviewed with the patient. The patient verbalized understanding. Discharge medications reviewed with the patient and appropriate educational materials and side effects teaching were provided. ___________________________________________________________________________________________________________________________________    Patient armband removed and given to patient to take home.   Patient was informed of the privacy risks if armband lost or stolen

## 2018-05-11 NOTE — IP AVS SNAPSHOT
Daphne Morales 
 
 
 [address] 
[phone] Patient: Deedee Wu MRN: [id number] CPJ:7[birth year]29[birth year]010 About your hospitalization You were admitted on:  May 11, 2018 You last received care in the:  [facility] PHASE 2 RECOVERY You were discharged on:  May 11, 2018 Why you were hospitalized Your primary diagnosis was:  Not on File Follow-up Information Follow up With Details Comments Contact Info None   None ([phone]) Patient stated that they have no PCP Michael Gonzales MD Schedule an appointment as soon as possible for a visit in 1 week Schedule surgery [address] [id number] 
[phone] Your Scheduled Appointments Thursday May 17, 2018  2:15 PM EDT  
ESTABLISHED PATIENT with Jimi Weston MD  
[address] [address]  
[phone] Discharge Orders None A check cynthia indicates which time of day the medication should be taken. My Medications CONTINUE taking these medications Instructions Each Dose to Equal  
 Morning Noon Evening Bedtime  
 cyclobenzaprine 10 mg tablet Commonly known as:  FLEXERIL Your last dose was: Your next dose is:    
   
   
 1/2 or one tid prn  
     
   
   
   
  
 diclofenac EC 75 mg EC tablet Commonly known as:  VOLTAREN Your last dose was: Your next dose is: Take 1 Tab by mouth two (2) times a day. 75 mg FLUoxetine 20 mg capsule Commonly known as:  PROzac Your last dose was: Your next dose is: Take 1 Cap by mouth daily. 20 mg LORazepam 0.5 mg tablet Commonly known as:  ATIVAN Your last dose was: Your next dose is: Take 1 Tab by mouth every eight (8) hours as needed for Anxiety.  Max Daily Amount: 1.5 mg.  
 0.5 mg  
    
 STOP taking these medications PEG 3350-Electrolytes 236-22.74-6.74 -5.86 gram suspension Commonly known as:  GO-LYTELY Discharge Instructions Colonoscopy Discharge Instructions Tavia Eduardo [id number] 
1960 COLONOSCOPY FINDINGS: 
Your colonoscopy showed: 1. Findings consistent with rectal prolapse. 2. One colon polyp which was completely removed. 3. Mild diverticulosis of the colon. FOLLOW UP RECOMMENDATIONS: 
 Dr. Ang will contact you with your results. Dr. Ang recommends follow up in clinic next week to discuss and schedule surgery for the rectal prolapse. Dr. Ang will recommend your next colonoscopy after the Pathology results are available. DISCOMFORT: 
If you have redness at your IV site- apply warm compress to area; if redness or soreness persist- contact your physician There may be a slight amount of blood passed from the rectum, more than a teaspoon of bright red blood is not expected - contact your physician Gaseous discomfort is common- walking, belching will help relieve any gas pains. If discomfort persist- contact your physician DIET: 
 High fiber diet. ACTIVITY: 
You may resume your normal daily activities, however, it is recommended that you spend the remainder of the day resting - avoiding any strenuous activities. You may not operate a vehicle for 24 hours You may not engage in an occupation involving machinery or appliances for rest of today You may not drink alcoholic beverages for at least 24 hours Avoid making any critical decisions for at least 24 hour CALL M.D. ANY SIGN OF: Increasing pain, nausea, vomiting Abdominal distension (swelling) New increased bleeding Fever or chills Pain in chest area or shortness of breath Samson Guillermo MD, FACS, FASCRS Colon and Rectal Surgery [facility] Office [phone] Fax     [phone] DISCHARGE SUMMARY from Nurse PATIENT INSTRUCTIONS: 
 
After general anesthesia or intravenous sedation, for 24 hours or while taking prescription Narcotics: · Limit your activities · Do not drive and operate hazardous machinery · Do not make important personal or business decisions · Do  not drink alcoholic beverages · If you have not urinated within 8 hours after discharge, please contact your surgeon on call. Report the following to your surgeon: 
· Excessive pain, swelling, redness or odor of or around the surgical area · Temperature over 100.5 · Nausea and vomiting lasting longer than 4 hours or if unable to take medications · Any signs of decreased circulation or nerve impairment to extremity: change in color, persistent  numbness, tingling, coldness or increase pain · Any questions What to do at Home: 
. These are general instructions for a healthy lifestyle: No smoking/ No tobacco products/ Avoid exposure to second hand smoke Surgeon General's Warning:  Quitting smoking now greatly reduces serious risk to your health. Obesity, smoking, and sedentary lifestyle greatly increases your risk for illness A healthy diet, regular physical exercise & weight monitoring are important for maintaining a healthy lifestyle You may be retaining fluid if you have a history of heart failure or if you experience any of the following symptoms:  Weight gain of 3 pounds or more overnight or 5 pounds in a week, increased swelling in our hands or feet or shortness of breath while lying flat in bed. Please call your doctor as soon as you notice any of these symptoms; do not wait until your next office visit. Recognize signs and symptoms of STROKE: 
 
F-face looks uneven A-arms unable to move or move unevenly S-speech slurred or non-existent T-time-call 911 as soon as signs and symptoms begin-DO NOT go  
 Back to bed or wait to see if you get better-TIME IS BRAIN. Warning Signs of HEART ATTACK Call 911 if you have these symptoms: 
? Chest discomfort. Most heart attacks involve discomfort in the center of the chest that lasts more than a few minutes, or that goes away and comes back. It can feel like uncomfortable pressure, squeezing, fullness, or pain. ? Discomfort in other areas of the upper body. Symptoms can include pain or discomfort in one or both arms, the back, neck, jaw, or stomach. ? Shortness of breath with or without chest discomfort. ? Other signs may include breaking out in a cold sweat, nausea, or lightheadedness. Don't wait more than five minutes to call [address]! Fast action can save your life. Calling 911 is almost always the fastest way to get lifesaving treatment. Emergency Medical Services staff can begin treatment when they arrive  up to an hour sooner than if someone gets to the hospital by car. The discharge information has been reviewed with the patient. The patient verbalized understanding. Discharge medications reviewed with the patient and appropriate educational materials and side effects teaching were provided. ___________________________________________________________________________________________________________________________________ Patient armband removed and given to patient to take home. Patient was informed of the privacy risks if armband lost or stolen Introducing [facility]! Romero Olson introduces Fiverr.com patient portal. Now you can access parts of your medical record, email your doctor's office, and request medication refills online. 1. In your internet browser, go to [email or web address] 2. Click on the First Time User? Click Here link in the Sign In box. You will see the New Member Sign Up page. 3. Enter your MyChart Access Code exactly as it appears below.  You will not need to use this code after youve completed the sign-up process. If you do not sign up before the expiration date, you must request a new code. · WillCall Access Code: [email or web address] Expires: 7/22/2018 11:58 AM 
 
4. Enter the last four digits of your Social Security Number (xxxx) and Date of Birth (mm/dd/yyyy) as indicated and click Submit. You will be taken to the next sign-up page. 5. Create a WillCall ID. This will be your WillCall login ID and cannot be changed, so think of one that is secure and easy to remember. 6. Create a WillCall password. You can change your password at any time. 7. Enter your Password Reset Question and Answer. This can be used at a later time if you forget your password. 8. Enter your e-mail address. You will receive e-mail notification when new information is available in [facility] [address] 9. Click Sign Up. You can now view and download portions of your medical record. 10. Click the Download Summary menu link to download a portable copy of your medical information. If you have questions, please visit the Frequently Asked Questions section of the WillCall website. Remember, WillCall is NOT to be used for urgent needs. For medical emergencies, dial 911. Now available from your iPhone and Android! Introducing Austin Morocho As a Use It Better patient, I wanted to make you aware of our electronic visit tool called Austin Morocho. Use It Better 24/7 allows you to connect within minutes with a medical provider 24 hours a day, seven days a week via a mobile device or tablet or logging into a secure website from your computer. You can access Austin Morocho from anywhere in the [place].  
 
A virtual visit might be right for you when you have a simple condition and feel like you just dont want to get out of bed, or cant get away from work for an appointment, when your regular Use It Better provider is not available (evenings, weekends or holidays), or when youre out of town and need minor care. Electronic visits cost only $49 and if the Radha [employer]aley[employer] provider determines a prescription is needed to treat your condition, one can be electronically transmitted to a nearby pharmacy*. Please take a moment to enroll today if you have not already done so. The enrollment process is free and takes just a few minutes. To enroll, please download the TroopSwap donell to your tablet or phone, or visit [email or web address] to enroll on your computer. And, as an [address] [place] [address] patient with a [employer] account, the results of your visits will be scanned into your electronic medical record and your primary care provider will be able to view the scanned results. We urge you to continue to see your regular Radha Mcclendon provider for your ongoing medical care. And while your primary care provider may not be the one available when you seek a i[facility]era virtual visit, the peace of mind you get from getting a real diagnosis real time can be priceless. For more information on iCentera, view our Frequently Asked Questions (FAQs) at [email or web address]. Sincerely, 
 
Jack Swan MD 
Chief Medical Officer [facility] Irlanda Betancur *:  certain medications cannot be prescribed via iCentera Providers Seen During Your Hospitalization Provider Specialty Primary office phone Aubrey Lindsay MD Colon and Rectal Surgery [phone] Your Primary Care Physician (PCP) Primary Care Physician Office Phone Office Fax NONE ** None ** ** None ** You are allergic to the following Allergen Reactions Codeine Nausea and Vomiting Recent Documentation Height Weight Breastfeeding? BMI OB Status Smoking Status 1.753 m 56 kg No 18.23 kg/m2 Postmenopausal Current Every Day Smoker Emergency Contacts Name Discharge Info Relation Home Work Mobile 51 Rosi Keene [17] [phone] [facility] CAREGIVER [3] Parent [1] [phone] Patient Belongings The following personal items are in your possession at time of discharge: 
  Dental Appliances: None  Visual Aid: At bedside, Glasses Please provide this summary of care documentation to your next provider. Signatures-by signing, you are acknowledging that this After Visit Summary has been reviewed with you and you have received a copy. Patient Signature:  ____________________________________________________________ Date:  ____________________________________________________________  
  
[facility] Provider Signature:  ____________________________________________________________ Date:  ____________________________________________________________

## 2018-05-11 NOTE — PROCEDURES
Colonoscopy Procedure Note      Greg Le  1960  [id number]                Date of Procedure: 5/11/2018    Indications:    Screening colonoscopy, rectal prolapse    Preoperative diagnosis: colon caner screening z12.11      Postoperative diagnosis: diverticulosis, polyp, rectal prolapse     Title of Procedure:  Colonoscopy with polypectomy    :  Omari Levy MD    Assistant(s): Endoscopy RN-1: Pao Ann RN  Endoscopy RN-2: Maria G Esparza RN    Referring Source:  None    Sedation:  Demerol 50 mg IV,  Versed 4 mg IV      ASA Class: ASA 3 - Severe systemic disease but compensated        Procedure Details:    Prior to the procedure, a history and physical were performed. The patients medications, allergies and sensitivities were reviewed and all questions were answered. After informed consent was obtained for the procedure, with all risks and benefits of procedure explained. The patient was taken to the endoscopy suite and placed in the left lateral decubitus position. Patient identification and proposed procedure were verified prior to the procedure by the nurse and I. Following the  satisfactory administration of sedation,  the anus was inspected and appeared within normal limits. Digital rectal examination revealed Decreased sphincter tone and squeeze pressure. Palpation revealed No Masses. Next the Olympus video colonoscope was introduced through the anus and advanced to cecum, which was identified by the ileocecal valve and appendiceal orifice, terminal ileum. The quality of preparation was good. The terminal ileum was visualized. The colonoscope was then slowly withdrawn and the mucosa carefully examined for any abnormalities. Cecal withdrawl time was greater than six minutes. The patient tolerated the procedure well. Findings:   Rectum: Moderately inflamed and hyperemic mucosa consistent with rectal prolapse.   Sigmoid: mild diverticulosis; Descending Colon: 1  Sessile polyp(s), the largest 3 mm in size;  Transverse Colon: normal  Ascending Colon: mild diverticulosis; Cecum: normal  Terminal Ileum: normal    Interventions:  1 complete polypectomy was performed using cold biopsy forceps and the polyp was retrieved    Specimen Removed:   ID Type Source Tests Collected by Time Destination   1 : Bx polyp Preservative Colon, Descending  Aubrey Lindsay MD 5/11/2018 1310 Pathology        Complications: None. EBL:  None. Impression:  diverticulosis, polyp, rectal prolapse      Recommendations: -Await pathology.  -Follow up in the office as scheduled. Resume normal medication(s). Discharge Disposition:  Home in the company of a  when able to ambulate.         Aubrey Lindsay MD, FACS, FASCRS  Colon and Rectal Surgery  [facility]  Office [phone]  Fax     [phone]  5/11/2018  1:23 PM       [facility]  [address] [place] [address], [address] [place] [address]

## 2018-05-16 ENCOUNTER — OFFICE VISIT (OUTPATIENT)
Dept: SURGERY | Age: 58
End: 2018-05-16

## 2018-05-16 VITALS
SYSTOLIC BLOOD PRESSURE: 132 MMHG | HEIGHT: 69 IN | BODY MASS INDEX: 18.81 KG/M2 | OXYGEN SATURATION: 98 % | HEART RATE: 82 BPM | TEMPERATURE: 97 F | RESPIRATION RATE: 16 BRPM | WEIGHT: 127 LBS | DIASTOLIC BLOOD PRESSURE: 78 MMHG

## 2018-05-16 DIAGNOSIS — K62.3 RECTAL PROLAPSE: Primary | ICD-10-CM

## 2018-05-16 RX ORDER — POLYETHYLENE GLYCOL 3350, SODIUM CHLORIDE, POTASSIUM CHLORIDE, SODIUM BICARBONATE, AND SODIUM SULFATE 240; 5.84; 2.98; 6.72; 22.72 G/4L; G/4L; G/4L; G/4L; G/4L
POWDER, FOR SOLUTION ORAL
Qty: 4 L | Refills: 0 | Status: SHIPPED | OUTPATIENT
Start: 2018-05-16 | End: 2018-05-18

## 2018-05-16 NOTE — PROGRESS NOTES
1. Have you been to the ER, urgent care clinic since your last visit? Hospitalized since your last visit? No    2. Have you seen or consulted any other health care providers outside of the [address] [place] [address] since your last visit? Include any pap smears or colon screening. No     Patient presents in clinic for follow up from colonoscopy 5/11/18. She would like to proceed with planning surgery for prolapse.

## 2018-05-16 NOTE — PROGRESS NOTES
[facility]  [id number] [place] [address], [address] [place] [address]              Colon and Rectal Surgery    Subjective: Tavia Eduardo is a 60 y.o. female who is here to discuss and schedule surgery for her rectal prolapse. The patient underwent a colonoscopy exam on 2018. This showed a moderately inflamed and hyperemic mucosa consistent with rectal prolapse, a small hyperplastic colon polyp which was removed, and mild diverticulosis of the sigmoid colon. I had previously discussed the surgical options. Given her severe COPD and tobacco abuse, she realistically elected the perineal proctosigmoidectomy with coloanal anastomosis approach (Altmeier procedure). Patient Active Problem List    Diagnosis Date Noted    Rectal prolapse 2018    Anxiety 2018    Chronic obstructive pulmonary disease ([address] 2018    Essential hypertension 2018    Cellulitis of abdominal wall 2015    Tobacco abuse     Alcohol abuse      Past Medical History:   Diagnosis Date    Alcohol abuse     Anxiety     Chronic obstructive pulmonary disease ([address]     Depression     Essential hypertension     Hypertension     Psychiatric disorder     Tobacco abuse       Past Surgical History:   Procedure Laterality Date    COLONOSCOPY N/A 2018    COL[facility] performed [facility] at [facility] ENDOSCOPY    HX GYN            Social History   Substance Use Topics    Smoking status: Current Every Day Smoker     Packs/day: 0.50    Smokeless tobacco: Never Used    Alcohol use No      Comment: regularly      Family History   Problem Relation Age of Onset    Diabetes Mother     Hypertension Mother     Heart Attack Mother     Hypertension Father     Diabetes Brother       Prior to Admission medications    Medication Sig Start Date End Date Taking?  Authorizing Provider   PEG 3350-Electrolytes (COLYTE) 240-22.72-6.72 -5.84 gram solution Take as directed 5/16/18  Yes Omari Levy MD   FLUoxetine (PROZAC) 20 mg capsule Take 1 Cap by mouth daily. 4/19/18  Yes Justo Lara MD   cyclobenzaprine (FLEXERIL) 10 mg tablet 1/2 or one tid prn 4/19/18  Yes Justo Lara MD   LORazepam (ATIVAN) 0.5 mg tablet Take 1 Tab by mouth every eight (8) hours as needed for Anxiety. Max Daily Amount: 1.5 mg. 4/19/18  Yes Justo Lara MD   diclofenac EC (VOLTAREN) 75 mg EC tablet Take 1 Tab by mouth two (2) times a day. 4/19/18  Yes Justo Lara MD     Current Outpatient Prescriptions   Medication Sig    PEG 3350-Electrolytes (COLYTE) 240-22.72-6.72 -5.84 gram solution Take as directed    FLUoxetine (PROZAC) 20 mg capsule Take 1 Cap by mouth daily.  cyclobenzaprine (FLEXERIL) 10 mg tablet 1/2 or one tid prn    LORazepam (ATIVAN) 0.5 mg tablet Take 1 Tab by mouth every eight (8) hours as needed for Anxiety. Max Daily Amount: 1.5 mg.    diclofenac EC (VOLTAREN) 75 mg EC tablet Take 1 Tab by mouth two (2) times a day. No current facility-administered medications for this visit. Allergies   Allergen Reactions    Codeine Nausea and Vomiting          Objective:     Visit Vitals    /78    Pulse 82    Temp 97 °F (36.1 °C) (Oral)    Resp 16    Ht 5' 9\" (1.753 m)    Wt 57.6 kg (127 lb)    SpO2 98%    BMI 18.75 kg/m2        Physical Exam:   GENERAL: alert, cooperative, no distress, appears stated age  LUNG: clear to auscultation bilaterally  HEART: regular rate and rhythm  ABDOMEN: soft, non-tender. Bowel sounds normal. No masses,  no organomegaly  EXTREMITIES:  extremities normal, atraumatic, no cyanosis or edema       Assessment:     Rectal prolapse    Plan:     I discussed the indications of surgical intervention and the details of the surgery, perineal proctosigmoidectomy with coloanal anastomosis approach (Altmeier procedure).     The risks of surgery were described including bleeding, infection, anesthesia risks, wound complications including anastomotic complications, and possibility of perioperative death. The patient understood the risks and was willing to accept these and proceed with surgery. All questions were answered to the patient's satisfaction.         Anurag Lr MD, FACS, FASCRS  Colon and Rectal Surgery  [facility]  Office [phone]  Fax     [phone]  5/16/2018  2:23 PM

## 2018-05-16 NOTE — MR AVS SNAPSHOT
Jordy Mccall 
 
 
 [address] [facility] [address] [facility] [address] 
[phone] Patient: Dimitri Riedel MRN: [id number] DAVE:[id number] Visit Information Date & Time Provider Department Dept. Phone Encounter #  
 5/16/2018  3:00 PM Kristen Hearn MD [facility] [phone] [id number] Follow-up Instructions Routing History Your Appointments 5/16/2018  3:00 PM  
Follow Up with Kristen Hearn MD  
[address] [place] [facility]) Appt Note: Follow up appointment; $0 copay-ytg  
 [address] [facility] [address] [facility] 700 [address] 5/17/2018  2:15 PM  
ESTABLISHED PATIENT with Kerry Zuniga MD  
[facility]) Appt Note: Anxiety [address] [facility] [address]  
  
    
 6/6/2018  2:00 PM  
POST OP with Kristen Hearn MD  
[address] ([facility]) Appt Note: post op from 05-23 surgery [address] [facility] [address] [place] [address] 88 710 [facility] [address] Upcoming Health Maintenance Date Due  
 BREAST CANCER SCRN MAMMOGRAM 3/31/2009 FOBT Q 1 YEAR AGE 50-75 3/31/2009 PAP AKA CERVICAL CYTOLOGY 10/11/2015 Influenza Age 5 to Adult 8/1/2018 DTaP/Tdap/Td series (2 - Td) 6/17/2025 Allergies as of 5/16/2018  Review Complete On: 5/16/2018 By: Kristen Hearn MD  
  
 Severity Noted Reaction Type Reactions Codeine High   Nausea and Vomiting Current Immunizations  Reviewed on 6/17/2015 Name Date Pneumococcal Polysaccharide (PPSV-23) 6/22/2015  8:16 PM  
 Tdap 6/17/2015  2:59 PM  
  
 Not reviewed this visit You Were Diagnosed With   
  
 Codes Comments Rectal prolapse    -  Primary ICD-10-CM: A01.0 ICD-9-CM: 569.1 Vitals BP Pulse Temp Resp Height(growth percentile) Weight(growth percentile) 132/78 82 97 °F (36.1 °C) (Oral) 16 5' 9\" (1.753 m) 127 lb (57.6 kg) SpO2 BMI OB Status Smoking Status 98% 18.75 kg/m2 Postmenopausal Current Every Day Smoker Vitals History BMI and BSA Data Body Mass Index Body Surface Area 18.75 kg/m 2 1.67 m 2 Preferred Pharmacy Pharmacy Name Phone [address], [address] [address] [address] [phone] Your Updated Medication List  
  
   
This list is accurate as of 5/16/18  2:38 PM.  Always use your most recent med list.  
  
  
  
  
 cyclobenzaprine 10 mg tablet Commonly known as:  FLEXERIL  
1/2 or one tid prn  
  
 diclofenac EC 75 mg EC tablet Commonly known as:  VOLTAREN Take 1 Tab by mouth two (2) times a day. FLUoxetine 20 mg capsule Commonly known as:  PROzac Take 1 Cap by mouth daily. LORazepam 0.5 mg tablet Commonly known as:  ATIVAN Take 1 Tab by mouth every eight (8) hours as needed for Anxiety. Max Daily Amount: 1.5 mg. PEG 3350-Electrolytes 240-22.72-6.72 -5.84 gram solution Commonly known as:  COLYTE Take as directed Prescriptions Sent to Pharmacy Refills PEG 3350-Electrolytes (COLYTE) 240-22.72-6.72 -5.84 gram solution 0 Sig: Take as directed Class: Normal  
 Pharmacy: [address] [place] [address], [address] Ph #: [phone] To-Do List   
 05/16/2018 Lab:  CBC WITH AUTOMATED DIFF   
  
 05/16/2018 ECG:  EKG, 12 LEAD, INITIAL   
  
 05/16/2018 Lab:  METABOLIC PANEL, COMPREHENSIVE   
  
 05/16/2018 Lab:  URINALYSIS W/MICROSCOPIC   
  
 05/16/2018 Imaging:  XR CHEST PA LAT Patient Instructions If you have any questions or concerns about today's appointment, the verbal and/or written instructions you were given for follow up care, please call our office at [phone]. [facility] [address] [facility], [address] 
 
[phone] office [phone] fax Shira Reinoso [facility]  [facility] [address] [facility], [address] [facility] [address] 
 
[phone] office main line [phone] main fax PATIENT PRE AND POST OPERATIVE INSTRUCTIONS Hospital: [address] [place] [address] Road 
[phone] Before Surgery Instructions:  
1) You must have someone available to drive you to and from your procedure and stay with you for the first 24 hours. 2) It is very important that you have nothing to eat or drink after midnight the night before your surgery. This includes chewing gum or sucking on hard candy. Take only heart, blood pressure and cholesterol medications the morning of surgery with only a sip of water. 3) Please stop taking Plavix 10 days prior to your surgery. Stop taking Coumadin 5 days prior to your surgery. Stop taking all Aspirin or Aspirin containing products 7 days prior to your surgery. Stop taking Advil, Motrin, Aleve, and etc. 3 days prior to your surgery. 4) If you take any diabetic medications please consult with your primary care physician on how to take them on the day of your surgery 5) Please stop all Herbal products 2 weeks prior to your surgery. 6) Please arrive at the hospital 2 hours prior to your surgery, unless you have been otherwise instructed. 7) Patients having an operation on their colon will be given a separate instruction sheet on their Bowel Prep. 8) For any pre-operative work up check in at the main entrance to [address] [place] [address], and then go to Patient Registration. These studies are done on a walk in basis they are open from 7:00am to 5:00pm Monday through Friday. 9) Please wash your surgical site the morning of your surgery with soap and water. 10) If you are of child bearing age you will have pregnancy test done the morning of your surgery as soon as you arrive. 11) You may be contacted to change your surgery time. At times this is necessary due to equipment or staffing needs. Surgery Date and Time:           May 23,2018            at   9:00       am       
 
Please check in at [facility], enter through the Emergency Room entrance and go up to the second floor. Please check in by    7:00     am  the day of your surgery. You may contact Abiodun Rivera with any questions at [phone]. After Surgery Instructions: You will need to be seen in the office for a follow-up visit 7-14 days after your surgery. Please call after you have had the procedure to make this appointment. Unless otherwise instructed, you may remove your outer bandage and shower 48 hours after your surgery. If you develop a fever greater than 101, have any significant drainage, bleeding, swelling and/or pus of the wound. Please call our office immediately. Patient left practice 2:38pm 5/16/2018 REENA Introducing [facility]! Sharmin Howe introduces WeFi patient portal. Now you can access parts of your medical record, email your doctor's office, and request medication refills online. 1. In your internet browser, go to [email or web address]. [email or web address] 2. Click on the First Time User? Click Here link in the Sign In box. You will see the New Member Sign Up page. 3. Enter your WeFi Access Code exactly as it appears below. You will not need to use this code after youve completed the sign-up process. If you do not sign up before the expiration date, you must request a new code. · We[employer] Access Code: [email or web address] Expires: 7/22/2018 11:58 AM 
 
4. Enter the last four digits of your Social Security Number (xxxx) and Date of Birth (mm/dd/yyyy) as indicated and click Submit.  You will be taken to the next sign-up page. 5. Create a [employer]uen[employer] ID. This will be your [employer] login ID and cannot be changed, so think of one that is secure and easy to remember. 6. Create a [employer] password. You can change your password at any time. 7. Enter your Password Reset Question and Answer. This can be used at a later time if you forget your password. 8. Enter your e-mail address. You will receive e-mail notification when new information is available in [address] 9. Click Sign Up. You can now view and download portions of your medical record. 10. Click the Download Summary menu link to download a portable copy of your medical information. If you have questions, please visit the Frequently Asked Questions section of the [employer] website. Remember, [employer]ote is NOT to be used for urgent needs. For medical emergencies, dial 911. Now available from your iPhone and Android! Please provide this summary of care documentation to your next provider. Your primary care clinician is listed as [address]. If you have any questions after today's visit, please call [phone].

## 2018-05-16 NOTE — PATIENT INSTRUCTIONS
If you have any questions or concerns about today's appointment, the verbal and/or written instructions you were given for follow up care, please call our office at [phone]. [facility]  [address] [place] [address], [address] [place] [address] Road    [phone] office  [phone] fax    . Myah Alvarez [facility]  [address] [place] [address] Road    [phone] office main line  [phone] main fax                  PATIENT PRE AND POST [address]: [facility]     [address] [place] [address] Road  [phone]    Before Surgery Instructions:   1) You must have someone available to drive you to and from your procedure and stay with you for the first 24 hours. 2) It is very important that you have nothing to eat or drink after midnight the night before your surgery. This includes chewing gum or sucking on hard candy. Take only heart, blood pressure and cholesterol medications the morning of surgery with only a sip of water. 3) Please stop taking Plavix 10 days prior to your surgery. Stop taking Coumadin 5 days prior to your surgery. Stop taking all Aspirin or Aspirin containing products 7 days prior to your surgery. Stop taking Advil, Motrin, Aleve, and etc. 3 days prior to your surgery. 4) If you take any diabetic medications please consult with your primary care physician on how to take them on the day of your surgery  5) Please stop all Herbal products 2 weeks prior to your surgery. 6) Please arrive at the hospital 2 hours prior to your surgery, unless you have been otherwise instructed. 7) Patients having an operation on their colon will be given a separate instruction sheet on their Bowel Prep. 8) For any pre-operative work up check in at the main entrance to [facility], and then go to Patient Registration.  These studies are done on a walk in basis they are open from 7:00am to 5:00pm Monday through Friday. 9) Please wash your surgical site the morning of your surgery with soap and water. 10) If you are of child bearing age you will have pregnancy test done the morning of your surgery as soon as you arrive. 11) You may be contacted to change your surgery time. At times this is necessary due to equipment or staffing needs. Surgery Date and Time:           May 23,2018            at   9:00       am          Please check in at [facility], enter through the Emergency Room entrance and go up to the second floor. Please check in by    7:00     am  the day of your surgery. You may contact Claudette Rule with any questions at [phone]. After Surgery Instructions: You will need to be seen in the office for a follow-up visit 7-14 days after your surgery. Please call after you have had the procedure to make this appointment. Unless otherwise instructed, you may remove your outer bandage and shower 48 hours after your surgery. If you develop a fever greater than 101, have any significant drainage, bleeding, swelling and/or pus of the wound. Please call our office immediately.                       Patient left practice 2:38pm 5/16/2018 REENA

## 2018-05-17 ENCOUNTER — OFFICE VISIT (OUTPATIENT)
Dept: FAMILY MEDICINE CLINIC | Age: 58
End: 2018-05-17

## 2018-05-17 ENCOUNTER — HOSPITAL ENCOUNTER (OUTPATIENT)
Dept: LAB | Age: 58
Discharge: HOME | End: 2018-05-17

## 2018-05-17 VITALS
TEMPERATURE: 97.9 F | BODY MASS INDEX: 19.26 KG/M2 | HEIGHT: 69 IN | RESPIRATION RATE: 17 BRPM | DIASTOLIC BLOOD PRESSURE: 88 MMHG | WEIGHT: 130 LBS | OXYGEN SATURATION: 99 % | SYSTOLIC BLOOD PRESSURE: 143 MMHG | HEART RATE: 89 BPM

## 2018-05-17 DIAGNOSIS — N32.81 OAB (OVERACTIVE BLADDER): ICD-10-CM

## 2018-05-17 DIAGNOSIS — F41.9 ANXIETY: ICD-10-CM

## 2018-05-17 DIAGNOSIS — F41.9 ANXIETY: Primary | ICD-10-CM

## 2018-05-17 DIAGNOSIS — Z01.818 PREOP TESTING: ICD-10-CM

## 2018-05-17 LAB
ALBUMIN SERPL-MCNC: 3.7 G/DL (ref 3.4–5)
ALBUMIN/GLOB SERPL: 1.1 {RATIO} (ref 0.8–1.7)
ALP SERPL-CCNC: 76 U/L (ref 45–117)
ALT SERPL-CCNC: 32 U/L (ref 13–56)
ANION GAP SERPL CALC-SCNC: 8 MMOL/L (ref 3–18)
APPEARANCE UR: CLEAR
AST SERPL-CCNC: 27 U/L (ref 15–37)
BACTERIA URNS QL MICRO: NEGATIVE /HPF
BASOPHILS # BLD: 0 K/UL (ref 0–0.06)
BASOPHILS NFR BLD: 0 % (ref 0–2)
BILIRUB SERPL-MCNC: 0.2 MG/DL (ref 0.2–1)
BILIRUB UR QL: NEGATIVE
BUN SERPL-MCNC: 13 MG/DL (ref 7–18)
BUN/CREAT SERPL: 20 (ref 12–20)
CALCIUM SERPL-MCNC: 8.9 MG/DL (ref 8.5–10.1)
CHLORIDE SERPL-SCNC: 106 MMOL/L (ref 100–108)
CHOLEST SERPL-MCNC: 164 MG/DL
CO2 SERPL-SCNC: 24 MMOL/L (ref 21–32)
COLOR UR: YELLOW
CREAT SERPL-MCNC: 0.64 MG/DL (ref 0.6–1.3)
DIFFERENTIAL METHOD BLD: ABNORMAL
EOSINOPHIL # BLD: 0.4 K/UL (ref 0–0.4)
EOSINOPHIL NFR BLD: 4 % (ref 0–5)
EPITH CASTS URNS QL MICRO: NORMAL /LPF (ref 0–5)
ERYTHROCYTE [DISTWIDTH] IN BLOOD BY AUTOMATED COUNT: 14 % (ref 11.6–14.5)
EST. AVERAGE GLUCOSE BLD GHB EST-MCNC: 111 MG/DL
GLOBULIN SER CALC-MCNC: 3.3 G/DL (ref 2–4)
GLUCOSE SERPL-MCNC: 91 MG/DL (ref 74–99)
GLUCOSE UR STRIP.AUTO-MCNC: NEGATIVE MG/DL
HBA1C MFR BLD: 5.5 % (ref 4.2–5.6)
HCT VFR BLD AUTO: 36.7 % (ref 35–45)
HDLC SERPL-MCNC: 59 MG/DL (ref 40–60)
HDLC SERPL: 2.8 {RATIO} (ref 0–5)
HGB BLD-MCNC: 12.2 G/DL (ref 12–16)
HGB UR QL STRIP: NEGATIVE
KETONES UR QL STRIP.AUTO: NEGATIVE MG/DL
LDLC SERPL CALC-MCNC: 82.6 MG/DL (ref 0–100)
LEUKOCYTE ESTERASE UR QL STRIP.AUTO: NEGATIVE
LIPID PROFILE,FLP: NORMAL
LYMPHOCYTES # BLD: 2.8 K/UL (ref 0.9–3.6)
LYMPHOCYTES NFR BLD: 33 % (ref 21–52)
MCH RBC QN AUTO: 33.1 PG (ref 24–34)
MCHC RBC AUTO-ENTMCNC: 33.2 G/DL (ref 31–37)
MCV RBC AUTO: 99.5 FL (ref 74–97)
MONOCYTES # BLD: 1 K/UL (ref 0.05–1.2)
MONOCYTES NFR BLD: 12 % (ref 3–10)
NEUTS SEG # BLD: 4.2 K/UL (ref 1.8–8)
NEUTS SEG NFR BLD: 51 % (ref 40–73)
NITRITE UR QL STRIP.AUTO: NEGATIVE
PH UR STRIP: 6 [PH] (ref 5–8)
PLATELET # BLD AUTO: 420 K/UL (ref 135–420)
PMV BLD AUTO: 10.8 FL (ref 9.2–11.8)
POTASSIUM SERPL-SCNC: 4.9 MMOL/L (ref 3.5–5.5)
PROT SERPL-MCNC: 7 G/DL (ref 6.4–8.2)
PROT UR STRIP-MCNC: NEGATIVE MG/DL
RBC # BLD AUTO: 3.69 M/UL (ref 4.2–5.3)
RBC #/AREA URNS HPF: 0 /HPF (ref 0–5)
SODIUM SERPL-SCNC: 138 MMOL/L (ref 136–145)
SP GR UR REFRACTOMETRY: 1.01 (ref 1–1.03)
TRIGL SERPL-MCNC: 112 MG/DL (ref ?–150)
TSH SERPL DL<=0.05 MIU/L-ACNC: 3.83 UIU/ML (ref 0.36–3.74)
UROBILINOGEN UR QL STRIP.AUTO: 0.2 EU/DL (ref 0.2–1)
VLDLC SERPL CALC-MCNC: 22.4 MG/DL
WBC # BLD AUTO: 8.4 K/UL (ref 4.6–13.2)
WBC URNS QL MICRO: NORMAL /HPF (ref 0–4)

## 2018-05-17 PROCEDURE — 80053 COMPREHEN METABOLIC PANEL: CPT | Performed by: FAMILY MEDICINE

## 2018-05-17 PROCEDURE — 81001 URINALYSIS AUTO W/SCOPE: CPT | Performed by: FAMILY MEDICINE

## 2018-05-17 PROCEDURE — 85025 COMPLETE CBC W/AUTO DIFF WBC: CPT | Performed by: FAMILY MEDICINE

## 2018-05-17 PROCEDURE — 80061 LIPID PANEL: CPT | Performed by: FAMILY MEDICINE

## 2018-05-17 PROCEDURE — 84443 ASSAY THYROID STIM HORMONE: CPT | Performed by: FAMILY MEDICINE

## 2018-05-17 PROCEDURE — 83036 HEMOGLOBIN GLYCOSYLATED A1C: CPT | Performed by: FAMILY MEDICINE

## 2018-05-17 RX ORDER — FLUOXETINE HYDROCHLORIDE 40 MG/1
40 CAPSULE ORAL DAILY
Qty: 30 CAP | Refills: 1
Start: 2018-05-17 | End: 2018-06-21 | Stop reason: SDUPTHER

## 2018-05-17 RX ORDER — OXYBUTYNIN CHLORIDE 5 MG/1
5 TABLET ORAL 3 TIMES DAILY
Qty: 60 TAB | Refills: 1
Start: 2018-05-17 | End: 2018-06-21 | Stop reason: SDUPTHER

## 2018-05-17 NOTE — PROGRESS NOTES
HPI  Deedee Wu is a 60 y.o. female being seen today for   Chief Complaint   Patient presents with    Anxiety   . she states that she is much better in terms of her anxiety. 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 Rarely takes the ativan. Has seen colorectal for her prolapse and has surgery scheduled. No history of any problems with anesthesia. She has not had general anesthesia before that she can recall. No chest pain with activity. No excessive bleeding. Does c/o nocturia and urinary urgency x 6 mos. No incontinence or dysuria. Past Medical History:   Diagnosis Date    Alcohol abuse     Anxiety     Chronic obstructive pulmonary disease ([address]     Depression     Essential hypertension     Hypertension     Psychiatric disorder     Tobacco abuse          ROS  Patient states that she is feeling well. Denies complaints of chest pain, shortness of breath, swelling of legs, dizziness or weakness. she denies nausea, vomiting or diarrhea. Current Outpatient Prescriptions   Medication Sig    FLUoxetine (PROZAC) 40 mg capsule Take 1 Cap by mouth daily.  oxybutynin (DITROPAN) 5 mg tablet Take 1 Tab by mouth three (3) times daily.  PEG 3350-Electrolytes (COLYTE) 240-22.72-6.72 -5.84 gram solution Take as directed    cyclobenzaprine (FLEXERIL) 10 mg tablet 1/2 or one tid prn    LORazepam (ATIVAN) 0.5 mg tablet Take 1 Tab by mouth every eight (8) hours as needed for Anxiety. Max Daily Amount: 1.5 mg.    diclofenac EC (VOLTAREN) 75 mg EC tablet Take 1 Tab by mouth two (2) times a day. No current facility-administered medications for this visit.         PE  Visit Vitals    /88 (BP 1 Location: Left arm, BP Patient Position: Sitting)    Pulse 89    Temp 97.9 °F (36.6 °C) (Oral)    Resp 17    Ht 5' 9\" (1.753 m)    Wt 130 lb (59 kg)    SpO2 99%    BMI 19.2 kg/m2        Alert and oriented with normal mood and affect. she is well developed and well nourished . Lungs are clear without wheezing. Heart rate is regular without murmurs or gallops. There is no lower extremity edema. Assessment and Plan:        ICD-10-CM ICD-9-CM    1. Anxiety F41.9 300.00 TSH 3RD GENERATION   Increase prozac to 40mg. She understands we cannot rx ativan as an ongoing medicine   METABOLIC PANEL, COMPREHENSIVE      CBC WITH AUTOMATED DIFF   2. OAB (overactive bladder) N32.81 596.51 LIPID PANEL   Trial oxybutynin   URINALYSIS W/MICROSCOPIC   3. Preop testing Z01.818 V72.84 CBC WITH AUTOMATED DIFF   Will add her preop labs to her bloodwork today  EKG and CXR still to be done but I expect her to do fine with surgery.    HEMOGLOBIN A1C WITH EAG       Follow up in 2 mos for recheck    Chetna Hatfield MD

## 2018-05-18 ENCOUNTER — TELEPHONE (OUTPATIENT)
Dept: SURGERY | Age: 58
End: 2018-05-18

## 2018-05-18 ENCOUNTER — HOSPITAL ENCOUNTER (OUTPATIENT)
Dept: PREADMISSION TESTING | Age: 58
Discharge: HOME | End: 2018-05-18
Payer: SUBSIDIZED

## 2018-05-18 ENCOUNTER — HOSPITAL ENCOUNTER (OUTPATIENT)
Dept: LAB | Age: 58
Discharge: HOME | End: 2018-05-18
Payer: SUBSIDIZED

## 2018-05-18 ENCOUNTER — HOSPITAL ENCOUNTER (OUTPATIENT)
Dept: OTHER | Age: 58
Discharge: HOME | End: 2018-05-18
Payer: SUBSIDIZED

## 2018-05-18 DIAGNOSIS — K62.3 RECTAL PROLAPSE: ICD-10-CM

## 2018-05-18 LAB
ATRIAL RATE: 74 BPM
CALCULATED P AXIS, ECG09: 75 DEGREES
CALCULATED R AXIS, ECG10: 57 DEGREES
CALCULATED T AXIS, ECG11: 57 DEGREES
DIAGNOSIS, 93000: NORMAL
P-R INTERVAL, ECG05: 130 MS
Q-T INTERVAL, ECG07: 426 MS
QRS DURATION, ECG06: 80 MS
QTC CALCULATION (BEZET), ECG08: 472 MS
VENTRICULAR RATE, ECG03: 74 BPM

## 2018-05-18 PROCEDURE — 71046 X-RAY EXAM CHEST 2 VIEWS: CPT

## 2018-05-18 PROCEDURE — 93005 ELECTROCARDIOGRAM TRACING: CPT

## 2018-05-18 NOTE — PERIOP NOTES
PAT - SURGICAL PRE-ADMISSION INSTRUCTIONS    NAME:  Tracie Berry                                                          TODAY'S DATE:  5/18/2018    SURGERY DATE:  5/23/2018                                  SURGERY ARRIVAL TIME:   0700    1. Do NOT eat or drink anything, including candy or gum, after MIDNIGHT on 05/22/18 , unless you have specific instructions from your Surgeon or Anesthesia Provider to do so. 2. No smoking on the day of surgery. 3. No alcohol 24 hours prior to the day of surgery. 4. No recreational drugs for one week prior to the day of surgery. 5. Leave all valuables, including money/purse, at home. 6. Remove all jewelry, nail polish, makeup (including mascara); no lotions, powders, deodorant, or perfume/cologne/after shave. 7. Glasses/Contact lenses and Dentures may be worn to the hospital.  They will be removed prior to surgery. 8. Call your doctor if symptoms of a cold or illness develop within 24 ours prior to surgery. 9. AN ADULT MUST DRIVE YOU HOME AFTER OUTPATIENT SURGERY. 10. If you are having an OUTPATIENT procedure, please make arrangements for a responsible adult to be with you for 24 hours after your surgery. 11. If you are admitted to the hospital, you will be assigned to a bed after surgery is complete. Normally a family member will not be able to see you until you are in your assigned bed. 15. Family is encouraged to accompany you to the hospital.  We ask visitors in the treatment area to be limited to ONE person at a time to ensure patient privacy. EXCEPTIONS WILL BE MADE AS NEEDED. 15. Children under 12 are discouraged from entering the treatment area and need to be supervised by an adult when in the waiting room. Special Instructions: Take these medications the morning of surgery with a sip of water:  May take Ativan if needed, Complete bowel prep per MD instructions.     Patient Prep:    use CHG solution    These surgical instructions were reviewed with Anabel Finch during the PAT visit. A printed copy of the instructions was provided to Anabel Finch. Directions: On the morning of surgery, please go to the [facility]. Enter the building from the [facility] entrance, 1st floor (next to the Emergency Room entrance). Take the elevator to the 2nd floor. Sign in at the Registration Desk.     If you have any questions and/or concerns, please do not hesitate to call:  (Prior to the day of surgery)  [facility] unit:  [phone]  (Day of surgery)  [facility] unit:  [phone]

## 2018-05-18 NOTE — TELEPHONE ENCOUNTER
Received msg from another staff member who spoke to this patient that patient would like to inform Dr. Flavio Vincent that when she was examined by PCP yesterday they heard wheezing in her lungs. Patient denies cough. No medications were prescribed by PCP. Will notify Dr. Flavio Vincent.

## 2018-05-22 ENCOUNTER — ANESTHESIA EVENT (OUTPATIENT)
Dept: SURGERY | Age: 58
DRG: 349 | End: 2018-05-22
Payer: SUBSIDIZED

## 2018-05-23 ENCOUNTER — HOSPITAL ENCOUNTER (INPATIENT)
Age: 58
LOS: 2 days | Discharge: HOME | DRG: 349 | End: 2018-05-25
Attending: COLON & RECTAL SURGERY | Admitting: COLON & RECTAL SURGERY
Payer: SUBSIDIZED

## 2018-05-23 ENCOUNTER — ANESTHESIA (OUTPATIENT)
Dept: SURGERY | Age: 58
DRG: 349 | End: 2018-05-23
Payer: SUBSIDIZED

## 2018-05-23 DIAGNOSIS — K62.3 RECTAL PROLAPSE: Primary | ICD-10-CM

## 2018-05-23 PROCEDURE — 76060000035 HC ANESTHESIA 2 TO 2.5 HR: Performed by: COLON & RECTAL SURGERY

## 2018-05-23 PROCEDURE — 88307 TISSUE EXAM BY PATHOLOGIST: CPT | Performed by: COLON & RECTAL SURGERY

## 2018-05-23 PROCEDURE — 77030010516 HC APPL HEMA CLP TELE -B: Performed by: COLON & RECTAL SURGERY

## 2018-05-23 PROCEDURE — 74011000272 HC RX REV CODE- 272: Performed by: COLON & RECTAL SURGERY

## 2018-05-23 PROCEDURE — 74011250636 HC RX REV CODE- 250/636

## 2018-05-23 PROCEDURE — 74011250636 HC RX REV CODE- 250/636: Performed by: NURSE ANESTHETIST, CERTIFIED REGISTERED

## 2018-05-23 PROCEDURE — 74011000250 HC RX REV CODE- 250

## 2018-05-23 PROCEDURE — 77030034154 HC SHR COAG HARM ACE J&J -F: Performed by: COLON & RECTAL SURGERY

## 2018-05-23 PROCEDURE — 74011000250 HC RX REV CODE- 250: Performed by: COLON & RECTAL SURGERY

## 2018-05-23 PROCEDURE — 77030008463 HC STPLR SKN PROX J&J -B: Performed by: COLON & RECTAL SURGERY

## 2018-05-23 PROCEDURE — 74011250637 HC RX REV CODE- 250/637: Performed by: NURSE ANESTHETIST, CERTIFIED REGISTERED

## 2018-05-23 PROCEDURE — 77030008683 HC TU ET CUF COVD -A: Performed by: ANESTHESIOLOGY

## 2018-05-23 PROCEDURE — 77030011276 HC ELECTRD LIG AXS COVD -E: Performed by: COLON & RECTAL SURGERY

## 2018-05-23 PROCEDURE — 77030008477 HC STYL SATN SLP COVD -A: Performed by: ANESTHESIOLOGY

## 2018-05-23 PROCEDURE — 74011250636 HC RX REV CODE- 250/636: Performed by: COLON & RECTAL SURGERY

## 2018-05-23 PROCEDURE — 77030018836 HC SOL IRR NACL ICUM -A: Performed by: COLON & RECTAL SURGERY

## 2018-05-23 PROCEDURE — 77030018702 HC CLP LIG TI TELE -A: Performed by: COLON & RECTAL SURGERY

## 2018-05-23 PROCEDURE — 76010000171 HC OR TIME 2 TO 2.5 HR INTENSV-TIER 1: Performed by: COLON & RECTAL SURGERY

## 2018-05-23 PROCEDURE — 77030011266 HC ELECTRD BLD INSL COVD -A: Performed by: COLON & RECTAL SURGERY

## 2018-05-23 PROCEDURE — 65270000029 HC RM PRIVATE

## 2018-05-23 PROCEDURE — 77030027138 HC INCENT SPIROMETER -A

## 2018-05-23 PROCEDURE — 77030008064 HC RNG RETRCTR COOP -B: Performed by: COLON & RECTAL SURGERY

## 2018-05-23 PROCEDURE — 77030011640 HC PAD GRND REM COVD -A: Performed by: COLON & RECTAL SURGERY

## 2018-05-23 PROCEDURE — 0DBP7ZZ EXCISION OF RECTUM, VIA NATURAL OR ARTIFICIAL OPENING: ICD-10-PCS | Performed by: COLON & RECTAL SURGERY

## 2018-05-23 PROCEDURE — 76210000006 HC OR PH I REC 0.5 TO 1 HR: Performed by: COLON & RECTAL SURGERY

## 2018-05-23 PROCEDURE — 77030003029 HC SUT VCRL J&J -B: Performed by: COLON & RECTAL SURGERY

## 2018-05-23 PROCEDURE — 77030013079 HC BLNKT BAIR HGGR 3M -A: Performed by: ANESTHESIOLOGY

## 2018-05-23 PROCEDURE — 77030026102 HC DEV TISS ENSEAL G2 J&J -F: Performed by: COLON & RECTAL SURGERY

## 2018-05-23 PROCEDURE — 74011250637 HC RX REV CODE- 250/637: Performed by: COLON & RECTAL SURGERY

## 2018-05-23 PROCEDURE — 77030034768 HC RETRCTR HK 2LD BLNT DISP COOP -A: Performed by: COLON & RECTAL SURGERY

## 2018-05-23 PROCEDURE — 77030032490 HC SLV COMPR SCD KNE COVD -B: Performed by: COLON & RECTAL SURGERY

## 2018-05-23 RX ORDER — FENTANYL CITRATE 50 UG/ML
25 INJECTION, SOLUTION INTRAMUSCULAR; INTRAVENOUS AS NEEDED
Status: DISCONTINUED | OUTPATIENT
Start: 2018-05-23 | End: 2018-05-23

## 2018-05-23 RX ORDER — VECURONIUM BROMIDE FOR INJECTION 1 MG/ML
INJECTION, POWDER, LYOPHILIZED, FOR SOLUTION INTRAVENOUS AS NEEDED
Status: DISCONTINUED | OUTPATIENT
Start: 2018-05-23 | End: 2018-05-23 | Stop reason: HOSPADM

## 2018-05-23 RX ORDER — DEXAMETHASONE SODIUM PHOSPHATE 4 MG/ML
INJECTION, SOLUTION INTRA-ARTICULAR; INTRALESIONAL; INTRAMUSCULAR; INTRAVENOUS; SOFT TISSUE AS NEEDED
Status: DISCONTINUED | OUTPATIENT
Start: 2018-05-23 | End: 2018-05-23 | Stop reason: HOSPADM

## 2018-05-23 RX ORDER — NEOSTIGMINE METHYLSULFATE 5 MG/5 ML
SYRINGE (ML) INTRAVENOUS AS NEEDED
Status: DISCONTINUED | OUTPATIENT
Start: 2018-05-23 | End: 2018-05-23 | Stop reason: HOSPADM

## 2018-05-23 RX ORDER — SODIUM CHLORIDE 0.9 % (FLUSH) 0.9 %
5-10 SYRINGE (ML) INJECTION EVERY 8 HOURS
Status: DISCONTINUED | OUTPATIENT
Start: 2018-05-23 | End: 2018-05-24

## 2018-05-23 RX ORDER — PROPOFOL 10 MG/ML
INJECTION, EMULSION INTRAVENOUS AS NEEDED
Status: DISCONTINUED | OUTPATIENT
Start: 2018-05-23 | End: 2018-05-23 | Stop reason: HOSPADM

## 2018-05-23 RX ORDER — OXYBUTYNIN CHLORIDE 5 MG/1
5 TABLET ORAL 3 TIMES DAILY
Status: DISCONTINUED | OUTPATIENT
Start: 2018-05-23 | End: 2018-05-25 | Stop reason: HOSPADM

## 2018-05-23 RX ORDER — ENOXAPARIN SODIUM 100 MG/ML
40 INJECTION SUBCUTANEOUS EVERY 24 HOURS
Status: DISCONTINUED | OUTPATIENT
Start: 2018-05-24 | End: 2018-05-25 | Stop reason: HOSPADM

## 2018-05-23 RX ORDER — GLYCOPYRROLATE 0.2 MG/ML
INJECTION INTRAMUSCULAR; INTRAVENOUS AS NEEDED
Status: DISCONTINUED | OUTPATIENT
Start: 2018-05-23 | End: 2018-05-23 | Stop reason: HOSPADM

## 2018-05-23 RX ORDER — EPHEDRINE SULFATE 50 MG/ML
INJECTION, SOLUTION INTRAVENOUS AS NEEDED
Status: DISCONTINUED | OUTPATIENT
Start: 2018-05-23 | End: 2018-05-23 | Stop reason: HOSPADM

## 2018-05-23 RX ORDER — NALOXONE HYDROCHLORIDE 0.4 MG/ML
0.4 INJECTION, SOLUTION INTRAMUSCULAR; INTRAVENOUS; SUBCUTANEOUS AS NEEDED
Status: DISCONTINUED | OUTPATIENT
Start: 2018-05-23 | End: 2018-05-25 | Stop reason: HOSPADM

## 2018-05-23 RX ORDER — DEXTROSE, SODIUM CHLORIDE, AND POTASSIUM CHLORIDE 5; .45; .15 G/100ML; G/100ML; G/100ML
50 INJECTION INTRAVENOUS CONTINUOUS
Status: DISCONTINUED | OUTPATIENT
Start: 2018-05-23 | End: 2018-05-25 | Stop reason: HOSPADM

## 2018-05-23 RX ORDER — CEFOTETAN AND DEXTROSE 2 G/50ML
2 INJECTION, SOLUTION INTRAVENOUS
Status: COMPLETED | OUTPATIENT
Start: 2018-05-23 | End: 2018-05-23

## 2018-05-23 RX ORDER — SODIUM CHLORIDE, SODIUM LACTATE, POTASSIUM CHLORIDE, CALCIUM CHLORIDE 600; 310; 30; 20 MG/100ML; MG/100ML; MG/100ML; MG/100ML
50 INJECTION, SOLUTION INTRAVENOUS CONTINUOUS
Status: DISCONTINUED | OUTPATIENT
Start: 2018-05-23 | End: 2018-05-23

## 2018-05-23 RX ORDER — ENOXAPARIN SODIUM 100 MG/ML
40 INJECTION SUBCUTANEOUS
Status: COMPLETED | OUTPATIENT
Start: 2018-05-23 | End: 2018-05-23

## 2018-05-23 RX ORDER — HYDROMORPHONE HYDROCHLORIDE 2 MG/ML
0.5 INJECTION, SOLUTION INTRAMUSCULAR; INTRAVENOUS; SUBCUTANEOUS
Status: DISCONTINUED | OUTPATIENT
Start: 2018-05-23 | End: 2018-05-23

## 2018-05-23 RX ORDER — LORAZEPAM 0.5 MG/1
TABLET ORAL
COMMUNITY
End: 2020-03-12

## 2018-05-23 RX ORDER — FLUOXETINE HYDROCHLORIDE 20 MG/1
40 CAPSULE ORAL DAILY
Status: DISCONTINUED | OUTPATIENT
Start: 2018-05-24 | End: 2018-05-25 | Stop reason: HOSPADM

## 2018-05-23 RX ORDER — SODIUM CHLORIDE 0.9 % (FLUSH) 0.9 %
5-10 SYRINGE (ML) INJECTION AS NEEDED
Status: DISCONTINUED | OUTPATIENT
Start: 2018-05-23 | End: 2018-05-25 | Stop reason: HOSPADM

## 2018-05-23 RX ORDER — POVIDONE-IODINE 10 %
SOLUTION, NON-ORAL TOPICAL AS NEEDED
Status: DISCONTINUED | OUTPATIENT
Start: 2018-05-23 | End: 2018-05-23 | Stop reason: HOSPADM

## 2018-05-23 RX ORDER — ONDANSETRON 2 MG/ML
INJECTION INTRAMUSCULAR; INTRAVENOUS AS NEEDED
Status: DISCONTINUED | OUTPATIENT
Start: 2018-05-23 | End: 2018-05-23 | Stop reason: HOSPADM

## 2018-05-23 RX ORDER — FENTANYL CITRATE 50 UG/ML
INJECTION, SOLUTION INTRAMUSCULAR; INTRAVENOUS AS NEEDED
Status: DISCONTINUED | OUTPATIENT
Start: 2018-05-23 | End: 2018-05-23 | Stop reason: HOSPADM

## 2018-05-23 RX ORDER — NALBUPHINE HYDROCHLORIDE 10 MG/ML
10 INJECTION, SOLUTION INTRAMUSCULAR; INTRAVENOUS; SUBCUTANEOUS
Status: DISCONTINUED | OUTPATIENT
Start: 2018-05-23 | End: 2018-05-23

## 2018-05-23 RX ORDER — SUCCINYLCHOLINE CHLORIDE 20 MG/ML
INJECTION INTRAMUSCULAR; INTRAVENOUS AS NEEDED
Status: DISCONTINUED | OUTPATIENT
Start: 2018-05-23 | End: 2018-05-23 | Stop reason: HOSPADM

## 2018-05-23 RX ORDER — ONDANSETRON 2 MG/ML
4 INJECTION INTRAMUSCULAR; INTRAVENOUS
Status: DISCONTINUED | OUTPATIENT
Start: 2018-05-23 | End: 2018-05-23

## 2018-05-23 RX ORDER — FAMOTIDINE 20 MG/1
20 TABLET, FILM COATED ORAL ONCE
Status: COMPLETED | OUTPATIENT
Start: 2018-05-23 | End: 2018-05-23

## 2018-05-23 RX ORDER — MIDAZOLAM HYDROCHLORIDE 1 MG/ML
INJECTION, SOLUTION INTRAMUSCULAR; INTRAVENOUS AS NEEDED
Status: DISCONTINUED | OUTPATIENT
Start: 2018-05-23 | End: 2018-05-23 | Stop reason: HOSPADM

## 2018-05-23 RX ORDER — HYDROCODONE BITARTRATE AND ACETAMINOPHEN 5; 325 MG/1; MG/1
1 TABLET ORAL AS NEEDED
Status: DISCONTINUED | OUTPATIENT
Start: 2018-05-23 | End: 2018-05-23

## 2018-05-23 RX ORDER — CYCLOBENZAPRINE HCL 10 MG
10 TABLET ORAL
Status: DISCONTINUED | OUTPATIENT
Start: 2018-05-23 | End: 2018-05-25 | Stop reason: HOSPADM

## 2018-05-23 RX ORDER — HYDROMORPHONE HYDROCHLORIDE 1 MG/ML
1 INJECTION, SOLUTION INTRAMUSCULAR; INTRAVENOUS; SUBCUTANEOUS
Status: DISCONTINUED | OUTPATIENT
Start: 2018-05-23 | End: 2018-05-25 | Stop reason: HOSPADM

## 2018-05-23 RX ORDER — ONDANSETRON 2 MG/ML
4 INJECTION INTRAMUSCULAR; INTRAVENOUS
Status: DISCONTINUED | OUTPATIENT
Start: 2018-05-23 | End: 2018-05-25 | Stop reason: HOSPADM

## 2018-05-23 RX ORDER — SODIUM CHLORIDE 0.9 % (FLUSH) 0.9 %
5-10 SYRINGE (ML) INJECTION AS NEEDED
Status: DISCONTINUED | OUTPATIENT
Start: 2018-05-23 | End: 2018-05-23 | Stop reason: HOSPADM

## 2018-05-23 RX ORDER — LIDOCAINE HYDROCHLORIDE 20 MG/ML
INJECTION, SOLUTION EPIDURAL; INFILTRATION; INTRACAUDAL; PERINEURAL AS NEEDED
Status: DISCONTINUED | OUTPATIENT
Start: 2018-05-23 | End: 2018-05-23 | Stop reason: HOSPADM

## 2018-05-23 RX ORDER — SODIUM CHLORIDE 0.9 % (FLUSH) 0.9 %
5-10 SYRINGE (ML) INJECTION EVERY 8 HOURS
Status: DISCONTINUED | OUTPATIENT
Start: 2018-05-23 | End: 2018-05-23 | Stop reason: HOSPADM

## 2018-05-23 RX ORDER — LORAZEPAM 0.5 MG/1
0.5 TABLET ORAL
Status: DISCONTINUED | OUTPATIENT
Start: 2018-05-23 | End: 2018-05-25 | Stop reason: HOSPADM

## 2018-05-23 RX ORDER — CEFOTETAN AND DEXTROSE 1 G/50ML
1 INJECTION, SOLUTION INTRAVENOUS EVERY 12 HOURS
Status: COMPLETED | OUTPATIENT
Start: 2018-05-23 | End: 2018-05-24

## 2018-05-23 RX ORDER — SODIUM CHLORIDE, SODIUM LACTATE, POTASSIUM CHLORIDE, CALCIUM CHLORIDE 600; 310; 30; 20 MG/100ML; MG/100ML; MG/100ML; MG/100ML
25 INJECTION, SOLUTION INTRAVENOUS CONTINUOUS
Status: DISCONTINUED | OUTPATIENT
Start: 2018-05-23 | End: 2018-05-23 | Stop reason: HOSPADM

## 2018-05-23 RX ADMIN — OXYBUTYNIN CHLORIDE 5 MG: 5 TABLET ORAL at 16:23

## 2018-05-23 RX ADMIN — CEFOTETAN AND DEXTROSE 1 G: 1 INJECTION, SOLUTION INTRAVENOUS at 23:36

## 2018-05-23 RX ADMIN — LIDOCAINE HYDROCHLORIDE 60 MG: 20 INJECTION, SOLUTION EPIDURAL; INFILTRATION; INTRACAUDAL; PERINEURAL at 10:12

## 2018-05-23 RX ADMIN — MIDAZOLAM HYDROCHLORIDE 2 MG: 1 INJECTION, SOLUTION INTRAMUSCULAR; INTRAVENOUS at 10:07

## 2018-05-23 RX ADMIN — Medication 3 MG: at 12:05

## 2018-05-23 RX ADMIN — Medication 10 ML: at 21:47

## 2018-05-23 RX ADMIN — HYDROCODONE BITARTRATE AND ACETAMINOPHEN 1 TABLET: 5; 325 TABLET ORAL at 12:49

## 2018-05-23 RX ADMIN — CEFOTETAN DISODIUM 2 G: 2 INJECTION, POWDER, FOR SOLUTION INTRAMUSCULAR; INTRAVENOUS at 10:17

## 2018-05-23 RX ADMIN — SODIUM CHLORIDE, SODIUM LACTATE, POTASSIUM CHLORIDE, AND CALCIUM CHLORIDE 25 ML/HR: 600; 310; 30; 20 INJECTION, SOLUTION INTRAVENOUS at 08:19

## 2018-05-23 RX ADMIN — OXYBUTYNIN CHLORIDE 5 MG: 5 TABLET ORAL at 21:39

## 2018-05-23 RX ADMIN — VECURONIUM BROMIDE FOR INJECTION 1 MG: 1 INJECTION, POWDER, LYOPHILIZED, FOR SOLUTION INTRAVENOUS at 11:31

## 2018-05-23 RX ADMIN — VECURONIUM BROMIDE FOR INJECTION 2 MG: 1 INJECTION, POWDER, LYOPHILIZED, FOR SOLUTION INTRAVENOUS at 10:43

## 2018-05-23 RX ADMIN — DEXTROSE MONOHYDRATE, SODIUM CHLORIDE, AND POTASSIUM CHLORIDE 75 ML/HR: 50; 4.5; 1.49 INJECTION, SOLUTION INTRAVENOUS at 13:25

## 2018-05-23 RX ADMIN — VECURONIUM BROMIDE FOR INJECTION 1 MG: 1 INJECTION, POWDER, LYOPHILIZED, FOR SOLUTION INTRAVENOUS at 10:47

## 2018-05-23 RX ADMIN — FENTANYL CITRATE 100 MCG: 50 INJECTION, SOLUTION INTRAMUSCULAR; INTRAVENOUS at 10:11

## 2018-05-23 RX ADMIN — FENTANYL CITRATE 25 MCG: 50 INJECTION, SOLUTION INTRAMUSCULAR; INTRAVENOUS at 12:35

## 2018-05-23 RX ADMIN — FAMOTIDINE 20 MG: 20 TABLET ORAL at 08:04

## 2018-05-23 RX ADMIN — Medication 1 MG: at 13:26

## 2018-05-23 RX ADMIN — FENTANYL CITRATE 25 MCG: 50 INJECTION, SOLUTION INTRAMUSCULAR; INTRAVENOUS at 12:30

## 2018-05-23 RX ADMIN — PROPOFOL 30 MG: 10 INJECTION, EMULSION INTRAVENOUS at 10:38

## 2018-05-23 RX ADMIN — ENOXAPARIN SODIUM 40 MG: 40 INJECTION SUBCUTANEOUS at 08:21

## 2018-05-23 RX ADMIN — ONDANSETRON 4 MG: 2 INJECTION INTRAMUSCULAR; INTRAVENOUS at 11:58

## 2018-05-23 RX ADMIN — PROPOFOL 150 MG: 10 INJECTION, EMULSION INTRAVENOUS at 10:12

## 2018-05-23 RX ADMIN — DEXAMETHASONE SODIUM PHOSPHATE 4 MG: 4 INJECTION, SOLUTION INTRA-ARTICULAR; INTRALESIONAL; INTRAMUSCULAR; INTRAVENOUS; SOFT TISSUE at 10:41

## 2018-05-23 RX ADMIN — Medication 1 MG: at 17:22

## 2018-05-23 RX ADMIN — GLYCOPYRROLATE 0.3 MG: 0.2 INJECTION INTRAMUSCULAR; INTRAVENOUS at 12:05

## 2018-05-23 RX ADMIN — SUCCINYLCHOLINE CHLORIDE 100 MG: 20 INJECTION INTRAMUSCULAR; INTRAVENOUS at 10:12

## 2018-05-23 RX ADMIN — Medication 1 MG: at 21:53

## 2018-05-23 RX ADMIN — EPHEDRINE SULFATE 10 MG: 50 INJECTION, SOLUTION INTRAVENOUS at 10:59

## 2018-05-23 NOTE — OP NOTES
COLON AND RECTAL SURGERY OPERATIVE NOTE            Procedure Date: 5/23/2018    Indications: Rectal Prolapse    Pre-operative Diagnosis:  rectal prolapse  k62.3    Post-operative Diagnosis:  rectal prolapse  k62.3    Title of Procedure:  Perineal Proctectomy with Primary Coloanal Anastomosis ([place] Procedure)    Surgeon(s): Matthew Padilla MD    Assistants: Circ-1: Rock Calixto RN  Scrub Tech-1: Jerrell Fitch  Scrub Tech-2: Mark Tripp  Surg Asst-1: Marsha Bills  Nurse Practitioner: Chaparro Solis NP    Anesthesiologist: Anesthesiologist: Kvng Pitts MD  CRNA: Alia Torres CRNA    Anesthesia: general anesthesia    ASA Class: ASA 3 - Severe systemic disease but compensated       Indication for surgery:   The patient is a 60 y.o., WHITE [place]  female who was recently seen in clinic and was noted to have symptomatic rectal prolapse. Due to the severity of the disease process, surgical treatment was discussed for more definitive management. The patient was informed of the indication for the procedure as well as the risk and complications. Specifically the risks of general anesthesia including potential cardiopulmonary complications or stroke were described. Surgical risks including potential for bleeding, infection, and venous thrombosis were discussed. The potential for intra abdominal infection or abscess possibly requiring further treatment with antibiotics, drain placement or further surgery was described. The possible potential need for ostomy formation for treatment of anastomotic complications was described. The potential for anastomotic leak and fistula formation was discussed. The patient demonstrated good understanding of surgical considerations, risks, benefits and alternatives and elected to proceed. The surgery was therefore scheduled. Procedure    Findings:   Complete rectal prolapse (5-7 cm). Procedure Details:      The patient was brought to the operating room.   A general anesthesia was achieved with endotracheal intubation. The patient had already the pneumatic compression boots in place as well as the orogastric tube. A Gardiner catheter was then placed in the sterile fashion. She was then carefully positioned in the prone jackknife position The safety strap was carefully secured and pressure points were carefully padded. The patient was then prepped and draped in the standard sterile fashion    The rectum itself appeared to be quite engorged and inflamed and edematous. Approximately 5-7 cm of the rectum  was prolapsed. This was reducible, and the anal orifice was slightly patulous. Next, the Altemeier procedure was performed in the following fashion. A [place] retractor with blunt hooks was placed to expose the rectal prolapse. A circumferential incision was then made with the electrocautery approximately 1.5 cm proximal to the dentate line which was easily visualized. At the 12 o'clock position, the 3 o'clock position, the 6 o'clock position and the 9 o'clock position, full-thickness bites were taken with a 3-0 Vicryl stitch and anchored respectively at the radial orientation using the [place] retractor. Further incision resulted in exposure of the mesorectum posteriorly as well as the cul-de-sac anteriorly. Additional [place] hooks were placed circumferentially. Te cul-de-sac did not need to be incised. The patient had the prolapsed rectum without the sigmoid colon being involved. Carefully, the mesorectum was doubly clamped, divided, and tied with 2-0 Vicryl. The entire involved rectum was then easily prolapsed outward and the mesenteric attachments were freed circumferentially. It was noted that the anastomosis from the rectosigmoid colon to the anus will result in tension-free anastomosis and yet redundant-free anastomosis allowing for minimization of the recurrent prolapse issues.  The rectosigmoid junction at the designated level was then incised circumferentially with electrocautery. Sequentially, the coloanal anastomosis was carried out using interrupted 3-0 Vicryl sutures. This was achieved in circumferential fashion making sure there were no discrepancies at the anastomosis site due to the size differential. The completely resected rectum was then sent to pathology for routine analysis. A very satisfactory repair of the rectal prolapse was noted to have been accomplished with satisfactory hemostasis. Next, a dry sterile dressing was then placed at the anal verge. The patient was then positioned in the supine position. She was successfully extubated in the operating room and was transported to the post-anesthesia recovery room in good condition. Needle and sponge counts were correct. Estimated Blood Loss:  20 mL           Drains: None           Total IV Fluids: 1200 mL           Specimens: Sent - Rectal prolapse specimen to Pathology. Complications: None             Disposition: awakened from anesthesia, extubated and taken to the recovery room in a stable condition, having suffered no apparent untoward event.            Condition: good    Surgeons Signature:       Leroy Machado MD, FACS, FASCRS  Colon and Rectal Surgery  [employer] [facility]  Office [phone]  Fax     [phone]  5/23/2018  12:31 PM

## 2018-05-23 NOTE — PERIOP NOTES
TRANSFER - OUT REPORT:    Verbal report given to aleena edge(name) on Marc Reyes  being transferred to 2204(unit) for routine post - op       Report consisted of patients Situation, Background, Assessment and   Recommendations(SBAR). Information from the following report(s) SBAR, OR Summary, Intake/Output and MAR was reviewed with the receiving nurse. Lines:   Peripheral IV 05/23/18 Left Wrist (Active)   Site Assessment Clean, dry, & intact 5/23/2018 12:21 PM   Phlebitis Assessment 0 5/23/2018 12:21 PM   Infiltration Assessment 0 5/23/2018 12:21 PM   Dressing Status Clean, dry, & intact 5/23/2018 12:21 PM   Dressing Type Transparent;Tape 5/23/2018 12:21 PM   Hub Color/Line Status Patent; Infusing 5/23/2018 12:21 PM   Alcohol Cap Used Yes 5/23/2018  8:18 AM        Opportunity for questions and clarification was provided.       Patient transported with:   [employer]

## 2018-05-23 NOTE — ANESTHESIA PREPROCEDURE EVALUATION
Anesthetic History   No history of anesthetic complications            Review of Systems / Medical History  Patient summary reviewed and pertinent labs reviewed    Pulmonary    COPD               Neuro/Psych   Within defined limits           Cardiovascular    Hypertension              Exercise tolerance: >4 METS     GI/Hepatic/Renal  Within defined limits              Endo/Other  Within defined limits           Other Findings   Comments: Documentation of current medication  Current medications obtained, documented and obtained? YES      Risk Factors for Postoperative nausea/vomiting:       History of postoperative nausea/vomiting? YES       Female? YES       Motion sickness? NO       Intended opioid administration for postoperative analgesia? YES      Smoking Abstinence:  Current Smoker? YES  Elective Surgery? YES  Seen preoperatively by anesthesiologist or proxy prior to day of surgery? YES  Pt abstained from smoking 24 hours prior to anesthesia?  NO    Preventive care/screening for High Blood Pressure:  Aged 18 years and older: YES  Screened for high blood pressure: YES  Patients with high blood pressure referred to primary care provider   for BP management: YES           Physical Exam    Airway  Mallampati: II  TM Distance: 4 - 6 cm  Neck ROM: normal range of motion   Mouth opening: Normal     Cardiovascular  Regular rate and rhythm,  S1 and S2 normal,  no murmur, click, rub, or gallop  Rhythm: regular  Rate: normal         Dental  No notable dental hx       Pulmonary  Breath sounds clear to auscultation               Abdominal  GI exam deferred       Other Findings            Anesthetic Plan    ASA: 2  Anesthesia type: general          Induction: Intravenous  Anesthetic plan and risks discussed with: Patient

## 2018-05-23 NOTE — H&P (VIEW-ONLY)
[facility]  [id number] [place] [address], [address] [place] [address]              Colon and Rectal Surgery    Subjective: Sola Sheldon is a 60 y.o. female who is here to discuss and schedule surgery for her rectal prolapse. The patient underwent a colonoscopy exam on 2018. This showed a moderately inflamed and hyperemic mucosa consistent with rectal prolapse, a small hyperplastic colon polyp which was removed, and mild diverticulosis of the sigmoid colon. I had previously discussed the surgical options. Given her severe COPD and tobacco abuse, she realistically elected the perineal proctosigmoidectomy with coloanal anastomosis approach (Altmeier procedure). Patient Active Problem List    Diagnosis Date Noted    Rectal prolapse 2018    Anxiety 2018    Chronic obstructive pulmonary disease ([address] 2018    Essential hypertension 2018    Cellulitis of abdominal wall 2015    Tobacco abuse     Alcohol abuse      Past Medical History:   Diagnosis Date    Alcohol abuse     Anxiety     Chronic obstructive pulmonary disease ([address]     Depression     Essential hypertension     Hypertension     Psychiatric disorder     Tobacco abuse       Past Surgical History:   Procedure Laterality Date    COLONOSCOPY N/A 2018    COLONOSCOPY performed by George Chang MD at [facility] END[facility]COPY    HX GYN            Social History   Substance Use Topics    Smoking status: Current Every Day Smoker     Packs/day: 0.50    Smokeless tobacco: Never Used    Alcohol use No      Comment: regularly      Family History   Problem Relation Age of Onset    Diabetes Mother     Hypertension Mother     Heart Attack Mother     Hypertension Father     Diabetes Brother       Prior to Admission medications    Medication Sig Start Date End Date Taking?  Authorizing Provider   PEG 3350-Electrolytes (COLYTE) 240-22.72-6.72 -5.84 gram solution Take as directed 5/16/18  Yes Cam Chávez MD   FLUoxetine (PROZAC) 20 mg capsule Take 1 Cap by mouth daily. 4/19/18  Yes Joana Lara MD   cyclobenzaprine (FLEXERIL) 10 mg tablet 1/2 or one tid prn 4/19/18  Yes Joana Lara MD   LORazepam (ATIVAN) 0.5 mg tablet Take 1 Tab by mouth every eight (8) hours as needed for Anxiety. Max Daily Amount: 1.5 mg. 4/19/18  Yes Joana Lara MD   diclofenac EC (VOLTAREN) 75 mg EC tablet Take 1 Tab by mouth two (2) times a day. 4/19/18  Yes Joana Lara MD     Current Outpatient Prescriptions   Medication Sig    PEG 3350-Electrolytes (COLYTE) 240-22.72-6.72 -5.84 gram solution Take as directed    FLUoxetine (PROZAC) 20 mg capsule Take 1 Cap by mouth daily.  cyclobenzaprine (FLEXERIL) 10 mg tablet 1/2 or one tid prn    LORazepam (ATIVAN) 0.5 mg tablet Take 1 Tab by mouth every eight (8) hours as needed for Anxiety. Max Daily Amount: 1.5 mg.    diclofenac EC (VOLTAREN) 75 mg EC tablet Take 1 Tab by mouth two (2) times a day. No current facility-administered medications for this visit. Allergies   Allergen Reactions    Codeine Nausea and Vomiting          Objective:     Visit Vitals    /78    Pulse 82    Temp 97 °F (36.1 °C) (Oral)    Resp 16    Ht 5' 9\" (1.753 m)    Wt 57.6 kg (127 lb)    SpO2 98%    BMI 18.75 kg/m2        Physical Exam:   GENERAL: alert, cooperative, no distress, appears stated age  LUNG: clear to auscultation bilaterally  HEART: regular rate and rhythm  ABDOMEN: soft, non-tender. Bowel sounds normal. No masses,  no organomegaly  EXTREMITIES:  extremities normal, atraumatic, no cyanosis or edema       Assessment:     Rectal prolapse    Plan:     I discussed the indications of surgical intervention and the details of the surgery, perineal proctosigmoidectomy with coloanal anastomosis approach (Altmeier procedure).     The risks of surgery were described including bleeding, infection, anesthesia risks, wound complications including anastomotic complications, and possibility of perioperative death. The patient understood the risks and was willing to accept these and proceed with surgery. All questions were answered to the patient's satisfaction.         Selma Fernandez MD, FACS, FASCRS  Colon and Rectal Surgery  [facility]  Office [phone]  Fax     [phone]  5/16/2018  2:23 PM

## 2018-05-23 NOTE — PROGRESS NOTES
attempted to conduct an Initial Visit and Spiritual Assessment of patient. Patient was on the phone. She indicated that it was a long-distance call, but that she \"will be here. \" She also expressed gratitude for stopping by. Chaplains will continue to follow and provide pastoral care as needed or requested.  recommends bedside caregivers page  on duty if patient shows signs of acute spiritual or emotional distress. The Rev.  [facility],   [facility]  [address] [phone] / [facility] [phone]

## 2018-05-23 NOTE — PROGRESS NOTES
Problem: Falls - Risk of  Goal: *Absence of Falls  Document Nataly Fall Risk and appropriate interventions in the flowsheet.    Outcome: Progressing Towards Goal  Fall Risk Interventions:  Mobility Interventions: Patient to call before getting OOB         Medication Interventions: Patient to call before getting OOB    Elimination Interventions: Call light in reach

## 2018-05-23 NOTE — PROGRESS NOTES
1317  Received pt  From PACU, alert and oriented, tam intact and  IV site no redness and no swelling at site, medicated for pain. rectal dressing with small clear drainage, no skin breakdown, double check with Seth Pena. 1500  Resting , pain under control. 1730  Remove inside gauge, place another fresh gauge, no bleeding but small drainage light yellowish. Medicated for pain, per pt had bladder spasm at times. 1900  Resting , pain under control at this time.

## 2018-05-23 NOTE — ROUTINE PROCESS
Bedside and Verbal shift change report given to Cayetano Perrin   (oncoming nurse) by Rob Talbert RN   (offgoing nurse). Report included the following information SBAR, Kardex, Intake/Output and MAR.

## 2018-05-23 NOTE — IP AVS SNAPSHOT
[address] Patient: Tomy Wallace MRN: [id number] FSO:[id number] About your hospitalization You were admitted on:  May 23, 2018 You last received care in the:  [address] [place] [address],[address] You were discharged on:  May 25, 2018 Why you were hospitalized Your primary diagnosis was:  Not on File Your diagnoses also included:  Rectal Prolapse Follow-up Information Follow up With Details Comments Contact Info María Miranda MD On 6/6/2018 Scheduled for post op vist June 6, 2018 at 2pm [id number] [facility] [phone] 
[phone] Your Scheduled Appointments Wednesday June 06, 2018  2:00 PM EDT  
POST OP with María Miranda MD  
[address] [address] [facility] [address] [phone]  
[phone] Discharge Orders None A check cynthia indicates which time of day the medication should be taken. My Medications START taking these medications Instructions Each Dose to Equal  
 Morning Noon Evening Bedtime  
 oxyCODONE-acetaminophen 5-325 mg per tablet Commonly known as:  PERCOCET Your last dose was: Your next dose is: Take 1-2 Tabs by mouth every four (4) hours as needed for Pain. Max Daily Amount: 12 Tabs. 1-2 Tab ASK your doctor about these medications Instructions Each Dose to Equal  
 Morning Noon Evening Bedtime ATIVAN 0.5 mg tablet Generic drug:  LORazepam  
   
Your last dose was: Your next dose is: Take  by mouth every eight (8) hours as needed for Anxiety. cyclobenzaprine 10 mg tablet Commonly known as:  FLEXERIL Your last dose was: Your next dose is:    
   
   
 1/2 or one tid prn  
     
   
   
   
  
 diclofenac EC 75 mg EC tablet Commonly known as:  VOLTAREN Your last dose was: Your next dose is: Take 1 Tab by mouth two (2) times a day. 75 mg FLUoxetine 40 mg capsule Commonly known as:  PROzac Your last dose was: Your next dose is: Take 1 Cap by mouth daily. 40 mg  
    
   
   
   
  
 oxybutynin 5 mg tablet Commonly known as:  UUEOBGOH Your last dose was: Your next dose is: Take 1 Tab by mouth three (3) times daily. 5 mg Where to Get Your Medications Information on where to get these meds will be given to you by the nurse or doctor. ! Ask your nurse or doctor about these medications  
  oxyCODONE-acetaminophen 5-325 mg per tablet Opioid Education Prescription Opioids: What You Need to Know: 
 
 
 
F-face looks uneven A-arms unable to move or move unevenly S-speech slurred or non-existent T-time-call 911 as soon as signs and symptoms begin-DO NOT go Back to bed or wait to see if you get better-TIME IS BRAIN. Warning Signs of HEART ATTACK Call 911 if you have these symptoms: 
? Chest discomfort. Most heart attacks involve discomfort in the center of the chest that lasts more than a few minutes, or that goes away and comes back. It can feel like uncomfortable pressure, squeezing, fullness, or pain. ? Discomfort in other areas of the upper body. Symptoms can include pain or discomfort in one or both arms, the back, neck, jaw, or stomach. ? Shortness of breath with or without chest discomfort. ? Other signs may include breaking out in a cold sweat, nausea, or lightheadedness. Don't wait more than five minutes to call [address]! Fast action can save your life. Calling 911 is almost always the fastest way to get lifesaving treatment. Emergency Medical Services staff can begin treatment when they arrive  up to an hour sooner than if someone gets to the hospital by car. The discharge information has been reviewed with the patient. The patient verbalized understanding. Discharge medications reviewed with the patient and appropriate educational materials and side effects teaching were provided. Patient armband removed and shredded. ___________________________________________________________________________________________________________________________________ Upfront Chromatographyhart Announcement We are excited to announce that we are making your provider's discharge notes available to you in BeVocal. You will see these notes when they are completed and signed by the physician that discharged you from your recent hospital stay. If you have any questions or concerns about any information you see in [facility], please call the Health Information Department where you were seen or reach out to your Primary Care Provider for more information about your plan of care. Introducing \[facility] [place] [facility]\"" & HEALTH SERVICES! Amy Hawthorne introduces BeVocal patient portal. Now you can access parts of your medical record, email your doctor's office, and request medication refills online. 1. In your internet browser, go to [email or web address] 2. Click on the First Time User? Click Here link in the Sign In box. You will see the New Member Sign Up page. 3. Enter your [employer] Access Code exactly as it appears below. You will not need to use this code after youve completed the sign-up process. If you do not sign up before the expiration date, you must request a new code. · [employer] Access Code: [email or web address] Expires: 7/22/2018 11:58 AM 
 
4. Enter the last four digits of your Social Security Number (xxxx) and Date of Birth (mm/dd/yyyy) as indicated and click Submit. You will be taken to the next sign-up page. 5. Create a [employer] ID. This will be your [employer] login ID and cannot be changed, so think of one that is secure and easy to remember. 6. Create a [employer] password. You can change your password at any time. 7. Enter your Password Reset Question and Answer. This can be used at a later time if you forget your password. 8. Enter your e-mail address. You will receive e-mail notification when new information is available in [address] 9. Click Sign Up. You can now view and download portions of your medical record. 10. Click the Download Summary menu link to download a portable copy of your medical information. If you have questions, please visit the Frequently Asked Questions section of the [employer] website. Remember, [employer] is NOT to be used for urgent needs. For medical emergencies, dial 911. Now available from your iPhone and Android! Introducing Austin Morocho As a [facility] patient, I wanted to make you aware of our electronic visit tool called Austin Morocho. Sharmin [facility] 24/7 allows you to connect within minutes with a medical provider 24 hours a day, seven days a week via a mobile device or tablet or logging into a secure website from your computer. You can access Austin Morocho from anywhere in the [place].  
 
A virtual visit might be right for you when you have a simple condition and feel like you just dont want to get out of bed, or cant get away from work for an appointment, when your regular [employer] provider is not available (evenings, weekends or holidays), or when youre out of town and need minor care. Electronic visits cost only $49 and if the [employer] 24/7 provider determines a prescription is needed to treat your condition, one can be electronically transmitted to a nearby pharmacy*. Please take a moment to enroll today if you have not already done so. The enrollment process is free and takes just a few minutes. To enroll, please download the [employer] 24/7 donell to your tablet or phone, or visit [email or web address] to enroll on your computer. And, as an [address] [place] [address] patient with a [employer] account, the results of your visits will be scanned into your electronic medical record and your primary care provider will be able to view the scanned results. We urge you to continue to see your regular [employer] provider for your ongoing medical care. And while your primary care provider may not be the one available when you seek a [facility] virtual visit, the peace of mind you get from getting a real diagnosis real time can be priceless. For more information on [employer], view our Frequently Asked Questions (FAQs) at [email or web address]. Sincerely, 
 
Anusha Gutiérrez MD 
Chief Medical Officer [facility] Irlanda Betancur *:  certain medications cannot be prescribed via [employer] Providers Seen During Your Hospitalization Provider Specialty Primary office phone Agustina Hines MD Colon and Rectal Surgery [phone] Your Primary Care Physician (PCP) Primary Care Physician Office Phone Office Fax Indira 766, [address] [facility] [phone] You are allergic to the following Allergen Reactions Codeine Nausea and Vomiting Recent Documentation Height Weight BMI OB Status Smoking Status 1.753 m 57 kg 18.54 kg/m2 Postmenopausal Current Every Day Smoker Emergency Contacts Name Discharge Info Relation Home Work Mobile Carmen Edmond DISCHARGE CAREGIVER [3] Friend [5]   [phone] Patient Belongings The following personal items are in your possession at time of discharge: 
  Dental Appliances: None  Visual Aid: Glasses      Home Medications: None   Jewelry: None  Clothing: Pants, Shirt, Undergarments, Slippers    Other Valuables: Eyeglasses, Cell Phone, Keys, Cigarettes, Personal toiletries, Other (comment) (2 bags) Discharge Instructions Attachments/References RECTAL PROLAPSE (ENGLISH) OXYCODONE/ACETAMINOPHEN (BY MOUTH) (ENGLISH) Patient Handouts Rectal Prolapse: Care Instructions Your Care Instructions A \"prolapse\" means that a body part has slipped forward or down from where it should be. The rectum is a tube at the end of the colon. At the end of the rectum is the anus, which is the opening where stool leaves the body. A rectal prolapse happens when part or all of the wall of the rectum slides out of place, sticking out of the anus. It may be a partial prolapse, where only part of the lining of the rectum slips out of the anus. Or it may be a complete prolapse, where the entire wall of the rectum slips out. Many things increase your chance of having a rectal prolapse. Risk factors include: · Straining during bowel movements. · Tissue damage from surgery or childbirth. · Weakness of pelvic floor muscles as people get older. Your doctor may have found the problem after asking questions about your symptoms and doing a rectal exam. Home treatment often helps the problem. Some people may need surgery. Follow-up care is a key part of your treatment and safety. Be sure to make and go to all appointments, and call your doctor if you are having problems.  It's also a good idea to know your test results and keep a list of the medicines you take. How can you care for yourself at home? · Avoid constipation. Drink plenty of water, and eat fruits, vegetables, and other foods that contain fiber. Changes in diet often are enough to improve or reverse a partial prolapse. · Do Kegel exercises to help strengthen the muscles of the pelvic area. You do Kegel exercises by tightening the muscles you use when you urinate. · Don't strain during a bowel movement. Use a stool softener if you need to. · If it happens again, and if your doctor says it's okay, you can push the prolapse back into place. When should you call for help? Call your doctor now or seek immediate medical care if: 
? · The prolapse happens again. ? · You have new or worse pain. ? · You have new or worse bleeding from the rectum. ? Watch closely for changes in your health, and be sure to contact your doctor if: 
? · You cannot pass stools or gas. ? · You do not get better as expected. Where can you learn more? Go to http://charles-bernie.info/. Enter S203 in the search box to learn more about \"Rectal Prolapse: Care Instructions. \" Current as of: May 12, 2017 Content Version: 11.4 © 5049-8188 [employer]. Care instructions adapted under license by [employer] (which disclaims liability or warranty for this information). If you have questions about a medical condition or this instruction, always ask your healthcare professional. [place] [address] any warranty or liability for your use of this information. Oxycodone/Acetaminophen (By mouth) Acetaminophen (n-zsur-c-MIN-oh-fen), Oxycodone Hydrochloride (if-n-XGS-done zohra-droe-KLOR-luis alberto) Treats moderate to moderately severe pain. This medicine is a narcotic pain reliever. Brand Name(s): Endocet, Percocet, Primlev, Xartemis XR There may be other brand names for this medicine. When This Medicine Should Not Be Used: This medicine is not right for everyone. Do not use it if you had an allergic reaction to acetaminophen or oxycodone, or if you have serious breathing problems or paralytic ileus. How to Use This Medicine:  
Capsule, Liquid, Tablet, Long Acting Tablet · Your doctor will tell you how much medicine to use. Do not use more than directed. · An overdose can be dangerous. Follow directions carefully so you do not get too much medicine at one time. · Oral liquid: Measure the oral liquid medicine with a marked measuring spoon, oral syringe, or medicine cup. · Swallow the extended-release tablet whole. Do not crush, break, or chew it. Do not lick or wet the tablet before placing it in your mouth. Do not give this medicine through a feeding tube. · This medicine should come with a Medication Guide. Ask your pharmacist for a copy if you do not have one. · Missed dose: If you miss a dose of this medicine, skip the missed dose and go back to your regular dosing schedule. Do not double doses. · Store the medicine in a closed container at room temperature, away from heat, moisture, and direct light. Ask your pharmacist about the best way to dispose of medicine you do not use. Drugs and Foods to Avoid: Ask your doctor or pharmacist before using any other medicine, including over-the-counter medicines, vitamins, and herbal products. · Do not use Xartemis XR if you are using or have used an MAO inhibitor in the past 14 days. · Some medicines can affect how this medicine works. Tell your doctor if you are using any of the following: ¨ Carbamazepine, erythromycin, ketoconazole, lamotrigine, mirtazapine, naltrexone, phenytoin, propranolol, rifampin, ritonavir, tramadol, trazodone, or zidovudine ¨ Birth control pills ¨ Diuretic (water pill) ¨ Medicine to treat depression ¨ Phenothiazine medicine ¨ Triptan medicine to treat migraine headaches · Do not drink alcohol while you are using this medicine.  Acetaminophen can damage your liver, and alcohol can increase this risk. Do not take acetaminophen without asking your doctor if you have 3 or more drinks of alcohol every day. · Tell your doctor if you use anything else that makes you sleepy. Some examples are allergy medicine, narcotic pain medicine, and alcohol. Tell your doctor if you are using buprenorphine, butorphanol, nalbuphine, pentazocine, a benzodiazepine, or a muscle relaxer. Warnings While Using This Medicine: · Tell your doctor if you are pregnant or breastfeeding, or if you have kidney disease, liver disease, heart disease, low blood pressure, breathing problems or lung disease (such as asthma, COPD), thyroid problems, [place] disease, pancreas or gallbladder problems, prostate problems, trouble urinating, or a stomach problems, or a history of head injury or brain damage, seizures, or alcohol or drug abuse. Tell your doctor if you are allergic to codeine. · This medicine may cause the following problems: 
¨ High risk of overdose, which can lead to death ¨ Respiratory depression (serious breathing problem that can be life-threatening) ¨ Liver problems ¨ Serious skin reactions ¨ Serotonin syndrome (when used with certain medicines) · This medicine may make you dizzy or drowsy. Do not drive or do anything that could be dangerous until you know how this medicine affects you. Sit or lie down if you feel dizzy. Stand up carefully. · This medicine contains acetaminophen. Read the labels of all other medicines you are using to see if they also contain acetaminophen, or ask your doctor or pharmacist. Janak [place] not use more than 4 grams (4,000 milligrams) total of acetaminophen in one day. · This medicine can be habit-forming. Do not use more than your prescribed dose. Call your doctor if you think your medicine is not working. · Do not stop using this medicine suddenly. Your doctor will need to slowly decrease your dose before you stop it completely. · This medicine could cause infertility. Talk with your doctor before using this medicine if you plan to have children. · This medicine may cause constipation, especially with long-term use. Ask your doctor if you should use a laxative to prevent and treat constipation. · Keep all medicine out of the reach of children. Never share your medicine with anyone. Possible Side Effects While Using This Medicine:  
Call your doctor right away if you notice any of these side effects: · Allergic reaction: Itching or hives, swelling in your face or hands, swelling or tingling in your mouth or throat, chest tightness, trouble breathing · Anxiety, restlessness, fast heartbeat, fever, muscle spasms, twitching, diarrhea, seeing or hearing things that are not there · Blistering, peeling, red skin rash · Blue lips, fingernails, or skin · Dark urine or pale stools, loss of appetite, stomach pain, yellow skin or eyes · Extreme weakness, shallow breathing, uneven heartbeat, seizures, sweating, or cold or clammy skin · Severe confusion, lightheadedness, dizziness, or fainting · Severe constipation, nausea, or vomiting · Trouble breathing or slow breathing If you notice these less serious side effects, talk with your doctor:  
· Headache · Mild constipation, nausea, or vomiting · Mild sleepiness or drowsiness If you notice other side effects that you think are caused by this medicine, tell your doctor. Call your doctor for medical advice about side effects. You may report side effects to FDA at 6-559-FDA-5476 © 2017 [address] Information is for End User's use only and may not be sold, redistributed or otherwise used for commercial purposes. The above information is an  only. It is not intended as medical advice for individual conditions or treatments. Talk to your doctor, nurse or pharmacist before following any medical regimen to see if it is safe and effective for you. Please provide this summary of care documentation to your next provider. Signatures-by signing, you are acknowledging that this After Visit Summary has been reviewed with you and you have received a copy. Patient Signature:  ____________________________________________________________ Date:  ____________________________________________________________  
  
Kacy Fines Provider Signature:  ____________________________________________________________ Date:  ____________________________________________________________

## 2018-05-23 NOTE — ROUTINE PROCESS
TRANSFER - IN REPORT:    Verbal report received from serge(name) on Andrés Renetta  being received from pacu(unit) for routine post - op      Report consisted of patients Situation, Background, Assessment and   Recommendations(SBAR). Information from the following report(s) SBAR was reviewed with the receiving nurse. Opportunity for questions and clarification was provided.

## 2018-05-23 NOTE — INTERVAL H&P NOTE
H&P Update: Leonid Álvarez was seen and examined. History and physical has been reviewed. The patient has been examined.  There have been no significant clinical changes since the completion of the originally dated History and Physical.    Signed By: Eduarda Ramirez MD     May 23, 2018 9:10 AM

## 2018-05-23 NOTE — ANESTHESIA POSTPROCEDURE EVALUATION
Post-Anesthesia Evaluation and Assessment    Patient: Bang Best MRN: [id number]  SSN: [id number]    YOB: 1960  Age: 60 y.o. Sex: female       Cardiovascular Function/Vital Signs  Visit Vitals    /70    Pulse 74    Temp 36.4 °C (97.6 °F)    Resp 12    Ht 5' 9\" (1.753 m)    Wt 57 kg (125 lb 9 oz)    SpO2 93%    BMI 18.54 kg/m2       Patient is status post general anesthesia for Procedure(s):  rectal prolapse repair. Nausea/Vomiting: None    Postoperative hydration reviewed and adequate. Pain:  Pain Scale 1: Numeric (0 - 10) (05/23/18 1250)  Pain Intensity 1: 5 (05/23/18 1250)   Managed    Neurological Status:   Neuro (WDL): Within Defined Limits (05/23/18 1250)   At baseline    Mental Status and Level of Consciousness: Alert and oriented     Pulmonary Status:   O2 Device: Room air (05/23/18 1230)   Adequate oxygenation and airway patent    Complications related to anesthesia: None    Post-anesthesia assessment completed.  No concerns    Signed By: Ranjit Gonsalves CRNA     May 23, 2018

## 2018-05-23 NOTE — LETTER
NOTIFICATION OF RETURN TO WORK / SCHOOL 
 
5/25/2018 12:44 PM 
 
Ms. Deedee Burr [address] [facility] [phone] Tito Peña To Whom It May Concern: Deedee Wu was under the care of [facility] 2C ORTHO NEURO SRG She will be evaluated June 6, 2018 at post op visit with Dr. Michael Thorne. Currently her restrictions are no heavy lifting (has to be less than 10lbs) no squatting, no straining. We will reevaluate restrictions at follow up visit. If there are questions or concerns please have the patient contact our office. Sincerely, NORMAN Linda

## 2018-05-24 LAB
ANION GAP SERPL CALC-SCNC: 8 MMOL/L (ref 3–18)
BUN SERPL-MCNC: 5 MG/DL (ref 7–18)
BUN/CREAT SERPL: 8 (ref 12–20)
CALCIUM SERPL-MCNC: 9 MG/DL (ref 8.5–10.1)
CHLORIDE SERPL-SCNC: 101 MMOL/L (ref 100–108)
CO2 SERPL-SCNC: 27 MMOL/L (ref 21–32)
CREAT SERPL-MCNC: 0.62 MG/DL (ref 0.6–1.3)
ERYTHROCYTE [DISTWIDTH] IN BLOOD BY AUTOMATED COUNT: 13.2 % (ref 11.6–14.5)
GLUCOSE SERPL-MCNC: 94 MG/DL (ref 74–99)
HCT VFR BLD AUTO: 35.1 % (ref 35–45)
HGB BLD-MCNC: 11.8 G/DL (ref 12–16)
MCH RBC QN AUTO: 32.8 PG (ref 24–34)
MCHC RBC AUTO-ENTMCNC: 33.6 G/DL (ref 31–37)
MCV RBC AUTO: 97.5 FL (ref 74–97)
PLATELET # BLD AUTO: 424 K/UL (ref 135–420)
PMV BLD AUTO: 10 FL (ref 9.2–11.8)
POTASSIUM SERPL-SCNC: 3.9 MMOL/L (ref 3.5–5.5)
RBC # BLD AUTO: 3.6 M/UL (ref 4.2–5.3)
SODIUM SERPL-SCNC: 136 MMOL/L (ref 136–145)
WBC # BLD AUTO: 14 K/UL (ref 4.6–13.2)

## 2018-05-24 PROCEDURE — 36415 COLL VENOUS BLD VENIPUNCTURE: CPT | Performed by: COLON & RECTAL SURGERY

## 2018-05-24 PROCEDURE — 74011250637 HC RX REV CODE- 250/637: Performed by: COLON & RECTAL SURGERY

## 2018-05-24 PROCEDURE — 80048 BASIC METABOLIC PNL TOTAL CA: CPT | Performed by: COLON & RECTAL SURGERY

## 2018-05-24 PROCEDURE — 74011250636 HC RX REV CODE- 250/636: Performed by: COLON & RECTAL SURGERY

## 2018-05-24 PROCEDURE — 65270000029 HC RM PRIVATE

## 2018-05-24 PROCEDURE — 51798 US URINE CAPACITY MEASURE: CPT

## 2018-05-24 PROCEDURE — 74011000250 HC RX REV CODE- 250: Performed by: COLON & RECTAL SURGERY

## 2018-05-24 PROCEDURE — 74011250637 HC RX REV CODE- 250/637: Performed by: NURSE PRACTITIONER

## 2018-05-24 PROCEDURE — 85027 COMPLETE CBC AUTOMATED: CPT | Performed by: COLON & RECTAL SURGERY

## 2018-05-24 RX ORDER — OXYCODONE AND ACETAMINOPHEN 5; 325 MG/1; MG/1
1-2 TABLET ORAL
Status: DISCONTINUED | OUTPATIENT
Start: 2018-05-24 | End: 2018-05-25 | Stop reason: HOSPADM

## 2018-05-24 RX ADMIN — OXYCODONE HYDROCHLORIDE AND ACETAMINOPHEN 2 TABLET: 5; 325 TABLET ORAL at 22:04

## 2018-05-24 RX ADMIN — OXYBUTYNIN CHLORIDE 5 MG: 5 TABLET ORAL at 16:00

## 2018-05-24 RX ADMIN — ENOXAPARIN SODIUM 40 MG: 40 INJECTION SUBCUTANEOUS at 08:59

## 2018-05-24 RX ADMIN — Medication 1 MG: at 04:42

## 2018-05-24 RX ADMIN — FLUOXETINE 40 MG: 20 CAPSULE ORAL at 08:59

## 2018-05-24 RX ADMIN — DEXTROSE MONOHYDRATE, SODIUM CHLORIDE, AND POTASSIUM CHLORIDE 50 ML/HR: 50; 4.5; 1.49 INJECTION, SOLUTION INTRAVENOUS at 15:52

## 2018-05-24 RX ADMIN — Medication 1 MG: at 14:28

## 2018-05-24 RX ADMIN — CEFOTETAN AND DEXTROSE 1 G: 1 INJECTION, SOLUTION INTRAVENOUS at 11:25

## 2018-05-24 RX ADMIN — OXYBUTYNIN CHLORIDE 5 MG: 5 TABLET ORAL at 08:59

## 2018-05-24 RX ADMIN — OXYBUTYNIN CHLORIDE 5 MG: 5 TABLET ORAL at 22:03

## 2018-05-24 RX ADMIN — DEXTROSE MONOHYDRATE, SODIUM CHLORIDE, AND POTASSIUM CHLORIDE 75 ML/HR: 50; 4.5; 1.49 INJECTION, SOLUTION INTRAVENOUS at 02:45

## 2018-05-24 RX ADMIN — Medication 10 ML: at 13:58

## 2018-05-24 RX ADMIN — Medication 1 MG: at 10:23

## 2018-05-24 RX ADMIN — OXYCODONE HYDROCHLORIDE AND ACETAMINOPHEN 2 TABLET: 5; 325 TABLET ORAL at 17:55

## 2018-05-24 NOTE — PROGRESS NOTES
Nutrition initial assessment/plan of care      RECOMMENDATIONS:   1. GI Soft Diet  2. Monitor weight and PO intake  3. RD to follow     GOALS:   1. PO intake meets >75% of protein/calorie needs 5/31  2. Weight Maintenance (+/- 1-2 lb) by 5/31     ASSESSMENT:   Wt status is classified as normal per BMI of 18.5. Tolerating diet well. Labs noted. Nutrition recommendations listed. RD to follow. Nutrition Diagnoses:   No nutrition diagnosis at this time    Nutrition Risk:  [] High  [] Moderate [x]  Low    SUBJECTIVE/OBJECTIVE:   Pt is s/p Perineal Proctectomy with Primary Coloanal Anastomosis ([facility] Procedure) on (5/23). Pt tolerating CL diet and +flatus, but no BM yet. Diet advanced to GI Soft. Pt seen in room resting. Denies having any food allergies,  problems chewing/swallowing or recent wt loss. Reports having a good appetite. Denies any N/V/ABD pain and tolerated solid foods well for lunch. Will monitor.     Information Obtained from:    [x] Chart Review   [x] Patient   [] Family/Caregiver   [] Nurse/Physician   [] Interdisciplinary Meeting/Rounds    Diet: GI Soft  Medications: [x] Reviewed    Allergies: [x] Reviewed   Patient Active Problem List   Diagnosis Code    Tobacco abuse Z72.0    Alcohol abuse F10.10    Cellulitis of abdominal wall L03.311    Anxiety F41.9    Chronic obstructive pulmonary disease ([address] J44.9    Essential hypertension I10    Rectal prolapse K62.3       Past Medical History:   Diagnosis Date    Alcohol abuse     States she has quit    Anxiety     Chronic obstructive pulmonary disease ([address]     Depression     Essential hypertension     Hypertension     Psychiatric disorder     Tobacco abuse       Labs:    Lab Results   Component Value Date/Time    Sodium 136 05/24/2018 04:40 AM    Potassium 3.9 05/24/2018 04:40 AM    Chloride 101 05/24/2018 04:40 AM    CO2 27 05/24/2018 04:40 AM    Anion gap 8 05/24/2018 04:40 AM    Glucose 94 05/24/2018 04:40 AM    BUN 5 (L) 05/24/2018 04:40 AM    Creatinine 0.62 05/24/2018 04:40 AM    Calcium 9.0 05/24/2018 04:40 AM    Magnesium 1.3 (L) 10/04/2017 12:30 PM    Phosphorus 3.2 06/18/2015 05:03 AM    Albumin 3.7 05/17/2018 02:40 PM     Anthropometrics: BMI (calculated): 18.5  Last 3 Recorded Weights in this Encounter    05/18/18 1011 05/23/18 0755   Weight: 59 kg (130 lb) 57 kg (125 lb 9 oz)      Ht Readings from Last 1 Encounters:   05/18/18 5' 9\" (1.753 m)     Weight Metrics 5/23/2018 5/17/2018 5/16/2018 5/11/2018 4/23/2018 4/19/2018 12/8/2017   Weight 125 lb 9 oz 130 lb 127 lb 123 lb 7 oz 120 lb 113 lb 120 lb   BMI 18.54 kg/m2 19.2 kg/m2 18.75 kg/m2 18.23 kg/m2 17.72 kg/m2 16.69 kg/m2 17.72 kg/m2       No data found. IBW: 145# %IBW: 86%    [] Weight Loss [] Weight Gain [x] Weight Stable    Estimated Nutrition Needs: [x] MSJ  [] Other:  Calories: 6596-5810 Based on:   [x] Actual BW    Protein:  68-74g Based on:   [x] Actual BW    Fluid: 5486-2671 ml Based on:   [x] Actual BW      [x] No Cultural, Hindu or ethnic dietary need identified.     [] Cultural, Hindu and ethnic food preferences identified and addressed     Wt Status:  [x] Normal (18.6 - 24.9)     Nutrition Problems Identified:   [] Suboptimal PO intake   [] Food Allergies  [] Difficulty chewing/swallowing/poor dentition  [] Constipation/Diarrhea   [] Nausea/Vomiting   [x] None  [] Other:     Plan:   [x] Therapeutic Diet  []  Obtained/adjusted food preferences/tolerances and/or snacks options   []  Supplements added   []  HS snack added   []  Modify diet texture   []  Modify diet for food allergies   []  Educate patient   []  Assist with menu selection   [x]  Monitor PO intake on meal rounds   [x]  Continue inpatient monitoring and intervention   []  Participated in discharge planning/Interdisciplinary rounds/Team meetings   []  Other:     Education Needs:   [] Not appropriate for teaching at this time due to:   [x] Identified and addressed    Nutrition Monitoring and Evaluation:  [x] Continue ongoing monitoring and intervention  [] Rosalva Bonner

## 2018-05-24 NOTE — PROGRESS NOTES
[address]  [address] [place] [address]  [phone]  Colon and Rectal Surgery Progress Note      Patient: Tavia Eduardo MRN: [id number]  SSN: [id number]    YOB: 1960  Age: 60 y.o. Sex: female      Admit Date: 5/23/2018    LOS: 1 day     Subjective:   Patient doing well postoperatively  Denies N/V  Moderate rectal pain today prior to receiving pain medication,  Tolerating clear liquids  +Flatus, no BM    Objective:     Vitals:    05/23/18 2023 05/23/18 2352 05/24/18 0436 05/24/18 0818   BP: 132/68 114/62 130/80 133/75   Pulse: 79 69 64 79   Resp: 16 17 17 18   Temp: 97.8 °F (36.6 °C) 97.3 °F (36.3 °C) 98.6 °F (37 °C) 97.9 °F (36.6 °C)   SpO2: 93% 96% 100% 96%   Weight:       Height:            Intake and Output:  Current Shift:    Last three shifts: 05/22 1901 - 05/24 0700  In: 3128.8 [P.O.:540; I.V.:2588.8]  Out: 3820 [Urine:3800]    Physical Exam:   GENERAL: alert, cooperative, no distress, appears stated age  LUNG: clear to auscultation bilaterally  HEART: regular rate and rhythm, S1, S2 normal, no murmur, click, rub or gallop  ABDOMEN: soft, non-tender. Bowel sounds normal. No masses,  no organomegaly. RECTUM: No redness, drainage, swelling noted. Lab/Data Review: All lab results for the last 24 hours reviewed.        Assessment:     Active Problems:    Rectal prolapse (4/23/2018)        Plan:   Ambulate patient today  Advance to soft diet  D/C tam  Likely will be discharged tomorrow morning    Signed By: Bert Lewis NP        May 24, 2018

## 2018-05-24 NOTE — PROGRESS NOTES
0900  Instructed about helping her to get OOB and walk, dressing with small amount of  Tan drainage. 1100  Up  Walking in the room, pain under control. 1400  Gardiner removed, due to void. 1600  Dressing removed by Coy MAYA, skin pinkish from the tape and pt had been scratching it, wearing jacobo pad, so far very scanty light yellow drainage. Voided already. 1830  Up  Walking, pain under control with  Oral alternating with IV pain med.

## 2018-05-24 NOTE — PROGRESS NOTES
Care Management Interventions  PCP Verified by CM: Yes  Palliative Care Criteria Met (RRAT>21 & CHF Dx)?: No  Transition of Care Consult (CM Consult): Discharge Planning  Discharge Durable Medical Equipment: No  Physical Therapy Consult: No  Occupational Therapy Consult: No  Speech Therapy Consult: No  Current Support Network:  ([employer] [facility] and friend)  Confirm Follow Up Transport: Friends (palmer Simoni Oracio )  Plan discussed with Pt/Family/Caregiver: Yes  Discharge Location  Discharge Placement: Home/ [facility]        Reason for Admission:   Rectal Prolapse                   RRAT Score:          5           Plan for utilizing home health:      Not at this time                    Likelihood of Readmission:  low                         Transition of Care Plan:            Spoke with patient in room, She stated that she was independent with ADL's and has no DME's. She stated that she stays at the [facility] and plans to return there upon discharge. She verified her demographics, insurance(100% tresa) and PCP as correct on the facesheet. Patient has designated ________friend________________ to participate in his/her discharge plan and to receive any needed information. Krystin Dai [phone]. =She stated that her friend will transport her back to the [facility] upon discharge.

## 2018-05-24 NOTE — PROGRESS NOTES
CDMP Response:    The patient is of her optimal weight.       Deya Buckley MD, FACS, FASCRS  Colon and Rectal Surgery  [facility]  Office [phone]  Fax     [phone]  5/24/2018  12:56 PM

## 2018-05-24 NOTE — PROGRESS NOTES
Problem: Falls - Risk of  Goal: *Absence of Falls  Document Nataly Fall Risk and appropriate interventions in the flowsheet. Outcome: Progressing Towards Goal  Fall Risk Interventions:  Mobility Interventions: PT Consult for mobility concerns         Medication Interventions: Evaluate medications/consider consulting pharmacy    Elimination Interventions: Call light in reach             Problem: Pressure Injury - Risk of  Goal: *Prevention of pressure injury  Document Shen Scale and appropriate interventions in the flowsheet.    Outcome: Progressing Towards Goal  Pressure Injury Interventions:       Moisture Interventions: Absorbent underpads    Activity Interventions: Pressure redistribution bed/mattress(bed type)    Mobility Interventions: Pressure redistribution bed/mattress (bed type)    Nutrition Interventions: Document food/fluid/supplement intake

## 2018-05-24 NOTE — ROUTINE PROCESS
.Bedside shift change report given to [address] [place] [address], (oncoming nurse) by Ame Tay (offgoing nurse). Report included the following information SBAR, Kardex and MAR.

## 2018-05-24 NOTE — PROGRESS NOTES
Shift report given to this nurse by Francesco Bazan RN. Pt in bed A&OX4, HOB elevated, IV infusing in left hand, bed locked at lowest position, call bell in reach. Pt shows no active signs of distress at this time. Will continue to monitor. 1405 - Gardiner removed. Gardiner tubing intact patient tolerated well. DTV 2005    1435 - patient voided 50cc. 1550- pt voided 150cc    1555 - bladder scan is zero. 1600 - dressing has been removed, site is erythemic, patient has been scratching site. Site has been cleaned and moisture barrier applied. Patient tolerated well. 1800 - Pt tolerated regular diet well. Pt had small BM.

## 2018-05-24 NOTE — CDMP QUERY
>Patient is noted to have a BMI of 18.54,  Please clarify if this patient is:     =>Underweight  =>Cachexia  =>Failure to Thrive  =>Other explanation of clinical findings  =>Clinically Undetermined (no explanation for clinical findings)    Treatment:  dietary consult     Please clarify and document your clinical opinion in the progress notes and discharge summary including the definitive and/or presumptive diagnosis, (suspected or probable), related to the above clinical findings. Please include clinical findings supporting your diagnosis. If you DECLINE this query or would like to communicate with [facility], please utilize the \"[facility] message box\" at the TOP of the Progress Note on the right.       Thank you,  Montana Castanon RN/CCDS  [phone]

## 2018-05-24 NOTE — PROGRESS NOTES
[address] [facility]  [address] [place] [id number] [place] [address]  [phone]  Colon and Rectal Surgery Progress Note      Patient: Gee Montero MRN: [id number]  SSN: [id number]    YOB: 1960  Age: 60 y.o.   Sex: female      Admit Date: 5/23/2018    LOS: 1 day       I examined the patient independently and agree with evaluation as documented by the nurse practitioner, ALISHA James MD, [facility], FASCRS  Colon and Rectal Surgery  [facility]  Office [phone]  Fax     [phone]  5/24/2018  1:53 PM

## 2018-05-25 VITALS
HEART RATE: 87 BPM | SYSTOLIC BLOOD PRESSURE: 137 MMHG | RESPIRATION RATE: 18 BRPM | DIASTOLIC BLOOD PRESSURE: 85 MMHG | BODY MASS INDEX: 18.6 KG/M2 | TEMPERATURE: 98.3 F | OXYGEN SATURATION: 99 % | HEIGHT: 69 IN | WEIGHT: 125.56 LBS

## 2018-05-25 LAB
ERYTHROCYTE [DISTWIDTH] IN BLOOD BY AUTOMATED COUNT: 13.2 % (ref 11.6–14.5)
HCT VFR BLD AUTO: 34.6 % (ref 35–45)
HGB BLD-MCNC: 11.4 G/DL (ref 12–16)
MCH RBC QN AUTO: 32.7 PG (ref 24–34)
MCHC RBC AUTO-ENTMCNC: 32.9 G/DL (ref 31–37)
MCV RBC AUTO: 99.1 FL (ref 74–97)
PLATELET # BLD AUTO: 383 K/UL (ref 135–420)
PMV BLD AUTO: 9.4 FL (ref 9.2–11.8)
RBC # BLD AUTO: 3.49 M/UL (ref 4.2–5.3)
WBC # BLD AUTO: 8.6 K/UL (ref 4.6–13.2)

## 2018-05-25 PROCEDURE — 74011250637 HC RX REV CODE- 250/637: Performed by: NURSE PRACTITIONER

## 2018-05-25 PROCEDURE — 36415 COLL VENOUS BLD VENIPUNCTURE: CPT | Performed by: COLON & RECTAL SURGERY

## 2018-05-25 PROCEDURE — 85027 COMPLETE CBC AUTOMATED: CPT | Performed by: COLON & RECTAL SURGERY

## 2018-05-25 RX ORDER — OXYCODONE AND ACETAMINOPHEN 5; 325 MG/1; MG/1
1-2 TABLET ORAL
Qty: 20 TAB | Refills: 0 | Status: SHIPPED | OUTPATIENT
Start: 2018-05-25 | End: 2020-03-12

## 2018-05-25 RX ADMIN — OXYCODONE HYDROCHLORIDE AND ACETAMINOPHEN 2 TABLET: 5; 325 TABLET ORAL at 04:07

## 2018-05-25 RX ADMIN — OXYCODONE HYDROCHLORIDE AND ACETAMINOPHEN 2 TABLET: 5; 325 TABLET ORAL at 09:41

## 2018-05-25 NOTE — PROGRESS NOTES
I have reviewed discharge instructions with the patient. The patient verbalized understanding. Signature pad not working.

## 2018-05-25 NOTE — PROGRESS NOTES
Care Management Interventions  PCP Verified by CM: Yes  Palliative Care Criteria Met (RRAT>21 & CHF Dx)?: No  Transition of Care Consult (CM Consult): Discharge Planning  Discharge Durable Medical Equipment: No  Physical Therapy Consult: No  Occupational Therapy Consult: No  Speech Therapy Consult: No  Current Support Network:  ([employer] and friend)  Confirm Follow Up Transport: Friends  Plan discussed with Pt/Family/Caregiver: Yes  Discharge Location  Discharge Placement: Home Returning to the [facility]) Plan:  Returning to the [employer] [facility]:       How is patient being transported at discharge? Patient's friend Luis Judd [phone]. is transporting her home/ [employer] [facility]. When? Today     Is transport scheduled? Follow-up appointment and transportation:    ·  PCP? Follow up with Rosa Yeboah MD (Colon and Rectal Surgery) on 6/6/2018; Scheduled for post op vist June 6, 2018 at 2pm   · Go to [facility] on 5/31/2018        Specialist?     Time/Date? Who is transporting to the follow-up appointment? Patient stated that she has a friend that helps her. Is transport for follow up appointment scheduled? Communication plan (with patient/family): Who is being called? Patient or Next of Kin? Responsible party? Patient      What number(s) is to be used? [phone]      What service provider is calling for [facility] services? When are they calling? Readmission Risk?   (Green/Low; Yellow/Moderate; Red/High): low

## 2018-05-25 NOTE — PROGRESS NOTES
[address]  [address] [facility] [address] [place] [address]  [phone]  Colon and Rectal Surgery Progress Note      Patient: Zoraida Butler MRN: [id number]  SSN: [id number]    YOB: 1960  Age: 60 y.o. Sex: female      Admit Date: 5/23/2018    LOS: 2 days     Subjective:   Patient feel well today  Minimal neck pain, rectal pain, and groin pain  Small BM last night with flatus  Tolerating regular diet      Objective:     Vitals:    05/24/18 2025 05/24/18 2348 05/25/18 0409 05/25/18 0804   BP: 132/85 128/79 135/81 137/86   Pulse: 62 66 98 67   Resp: 16 17 16 18   Temp: 97.8 °F (36.6 °C) 98.3 °F (36.8 °C) 97.9 °F (36.6 °C) 98 °F (36.7 °C)   SpO2: 96% 95% 96% 98%   Weight:       Height:            Intake and Output:  Current Shift:    Last three shifts: 05/23 1901 - 05/25 0700  In: 4291.7 [P.O.:1840; I.V.:2451.7]  Out: 6100 [Urine:6100]    Physical Exam:   GENERAL: alert, cooperative, no distress, appears stated age  LUNG: clear to auscultation bilaterally  HEART: regular rate and rhythm, S1, S2 normal, no murmur, click, rub or gallop  ABDOMEN: soft, non-tender. Bowel sounds normal. No masses,  no organomegaly    Lab/Data Review: All lab results for the last 24 hours reviewed. Assessment:     Active Problems:    Rectal prolapse (4/23/2018)        Plan:    Will discharge today after lunch    Signed By: Daniela Carcamo NP        May 25, 2018

## 2018-05-25 NOTE — PROGRESS NOTES
Problem: Falls - Risk of  Goal: *Absence of Falls  Document Nataly Fall Risk and appropriate interventions in the flowsheet. Outcome: Progressing Towards Goal  Fall Risk Interventions:  Mobility Interventions: Patient to call before getting OOB    Medication Interventions: Patient to call before getting OOB, Evaluate medications/consider consulting pharmacy    Elimination Interventions: Call light in reach, Patient to call for help with toileting needs    Problem: Pressure Injury - Risk of  Goal: *Prevention of pressure injury  Document Shen Scale and appropriate interventions in the flowsheet.    Outcome: Progressing Towards Goal  Pressure Injury Interventions:       Moisture Interventions: Absorbent underpads    Activity Interventions: Increase time out of bed, Pressure redistribution bed/mattress(bed type)    Mobility Interventions: HOB 30 degrees or less, Pressure redistribution bed/mattress (bed type)    Nutrition Interventions: Document food/fluid/supplement intake

## 2018-05-25 NOTE — PROGRESS NOTES
1932 Received bedside and verbal shift change report from Betty Santo RN report included the following information SBAR, Kardex, Intake/Output and MAR. Pt a&o laying in the bed and watching tv, denied acute distress at this time. Call bell within reach will continue to monitor. 2205 Ambulated to the bathroom voided and remain stable no acute distress. Back to bed call bell within reach. 0028 Remain stable in the bed sleeping no acute distress call bell within reach. 0715 Bedside and Verbal shift change report given to Miguel Gale (oncoming nurse) by Matt Evans RN (offgoing nurse). Report included the following information SBAR, Kardex, Intake/Output and MAR. Pt remain stable no complaint, no acute distress call bell within reach.

## 2018-05-25 NOTE — DISCHARGE SUMMARY
[facility]  [address], [address], [facility]           Colon and Rectal Surgery Discharge Summary     Patient: Zoraida Butler MRN: [id number]  SSN: [id number]    YOB: 1960  Age: 60 y.o. Sex: female       Admit Date: 5/23/2018    Discharge Date: 5/25/2018      Admission Diagnoses: rectal prolapse  k62. 3;Rectal prolapse    Discharge Diagnoses:   Problem List as of 5/25/2018  Date Reviewed: 5/25/2018          Codes Class Noted - Resolved    Rectal prolapse ICD-10-CM: K62.3  ICD-9-CM: 569.1  4/23/2018 - Present        Anxiety ICD-10-CM: F41.9  ICD-9-CM: 300.00  4/19/2018 - Present        Chronic obstructive pulmonary disease ([facility] [address] ICD-10-CM: J44.9  ICD-9-CM: 282  4/19/2018 - Present        Essential hypertension ICD-10-CM: I10  ICD-9-CM: 401.9  4/19/2018 - Present        Tobacco abuse ICD-10-CM: Z72.0  ICD-9-CM: 305.1  Unknown - Present        Alcohol abuse ICD-10-CM: F10.10  ICD-9-CM: 305.00  Unknown - Present        Cellulitis of abdominal wall ICD-10-CM: L03.311  ICD-9-CM: 682.2  6/17/2015 - Present               Discharge Condition: Good    Hospital Course: The patient is a 60 y.o., [place], female who was recently seen in clinic and was noted to have symptomatic rectal prolapse. Due to the severity of the disease process, surgical treatment was discussed for more definitive management. The patient was informed of the indication for the procedure as well as the risk and complications. The patient underwent the following surgery on 5/23/2018 for rectal prolapse:         Perineal Proctectomy with Primary Coloanal Anastomosis ([facility] Procedure)     The intraoperative findings showed a complete rectal prolapse of 5-7 cm.     The patient's post operative course was very satisfactory. The patient is at present tolerating a regular diet, voiding without difficulty, and having essentially normal bowel function.   The post operative pain is present - adequately treated. Consults: Hospitalist    Significant Diagnostic Studies: None    Disposition: home    Discharge Medications:   Current Discharge Medication List      START taking these medications    Details   oxyCODONE-acetaminophen (PERCOCET) 5-325 mg per tablet Take 1-2 Tabs by mouth every four (4) hours as needed for Pain. Max Daily Amount: 12 Tabs. Qty: 20 Tab, Refills: 0    Associated Diagnoses: Rectal prolapse         CONTINUE these medications which have NOT CHANGED    Details   LORazepam (ATIVAN) 0.5 mg tablet Take  by mouth every eight (8) hours as needed for Anxiety. FLUoxetine (PROZAC) 40 mg capsule Take 1 Cap by mouth daily. Qty: 30 Cap, Refills: 1      cyclobenzaprine (FLEXERIL) 10 mg tablet 1/2 or one tid prn  Qty: 30 Tab, Refills: 1      diclofenac EC (VOLTAREN) 75 mg EC tablet Take 1 Tab by mouth two (2) times a day. Qty: 60 Tab, Refills: 2      oxybutynin (DITROPAN) 5 mg tablet Take 1 Tab by mouth three (3) times daily. Qty: 60 Tab, Refills: 1             Activity: No lifting, Driving, or Strenuous exercise for 1 week  Diet: Regular Diet  Wound Care: Keep wound clean and dry  Follow Up:   One week in Dr. Speedy Ge MD, [place] [address], [facility]  Colon and Rectal Surgery  [facility]  Office [phone]  Fax     [phone]  5/25/2018  1:09 PM

## 2018-05-25 NOTE — DISCHARGE INSTRUCTIONS
DISCHARGE SUMMARY from Nurse    PATIENT INSTRUCTIONS:    After general anesthesia or intravenous sedation, for 24 hours or while taking prescription Narcotics:  · Limit your activities  · Do not drive and operate hazardous machinery  · Do not make important personal or business decisions  · Do  not drink alcoholic beverages  · If you have not urinated within 8 hours after discharge, please contact your surgeon on call. Report the following to your surgeon:  · Excessive pain, swelling, redness or odor of or around the surgical area  · Temperature over 100.5  · Nausea and vomiting lasting longer than 4 hours or if unable to take medications  · Any signs of decreased circulation or nerve impairment to extremity: change in color, persistent  numbness, tingling, coldness or increase pain  · Any questions    What to do at Home:  Recommended activity: No lifting, Driving, or Strenuous exercise until cleared by surgeon. If you experience any of the symptoms above, please follow up with Dr. Ana De La Rosa. *  Please give a list of your current medications to your Primary Care Provider. *  Please update this list whenever your medications are discontinued, doses are      changed, or new medications (including over-the-counter products) are added. *  Please carry medication information at all times in case of emergency situations. These are general instructions for a healthy lifestyle:    No smoking/ No tobacco products/ Avoid exposure to second hand smoke  Surgeon General's Warning:  Quitting smoking now greatly reduces serious risk to your health.     Obesity, smoking, and sedentary lifestyle greatly increases your risk for illness    A healthy diet, regular physical exercise & weight monitoring are important for maintaining a healthy lifestyle    You may be retaining fluid if you have a history of heart failure or if you experience any of the following symptoms:  Weight gain of 3 pounds or more overnight or 5 pounds in a week, increased swelling in our hands or feet or shortness of breath while lying flat in bed. Please call your doctor as soon as you notice any of these symptoms; do not wait until your next office visit. Recognize signs and symptoms of STROKE:    F-face looks uneven    A-arms unable to move or move unevenly    S-speech slurred or non-existent    T-time-call 911 as soon as signs and symptoms begin-DO NOT go       Back to bed or wait to see if you get better-TIME IS BRAIN. Warning Signs of HEART ATTACK     Call 911 if you have these symptoms:   Chest discomfort. Most heart attacks involve discomfort in the center of the chest that lasts more than a few minutes, or that goes away and comes back. It can feel like uncomfortable pressure, squeezing, fullness, or pain.  Discomfort in other areas of the upper body. Symptoms can include pain or discomfort in one or both arms, the back, neck, jaw, or stomach.  Shortness of breath with or without chest discomfort.  Other signs may include breaking out in a cold sweat, nausea, or lightheadedness. Don't wait more than five minutes to call 911 - MINUTES MATTER! Fast action can save your life. Calling 911 is almost always the fastest way to get lifesaving treatment. Emergency Medical Services staff can begin treatment when they arrive -- up to an hour sooner than if someone gets to the hospital by car. The discharge information has been reviewed with the patient. The patient verbalized understanding. Discharge medications reviewed with the patient and appropriate educational materials and side effects teaching were provided. Patient armband removed and shredded.   ___________________________________________________________________________________________________________________________________

## 2018-06-06 ENCOUNTER — OFFICE VISIT (OUTPATIENT)
Dept: SURGERY | Age: 58
End: 2018-06-06

## 2018-06-06 VITALS
RESPIRATION RATE: 18 BRPM | BODY MASS INDEX: 19.4 KG/M2 | HEIGHT: 69 IN | HEART RATE: 68 BPM | SYSTOLIC BLOOD PRESSURE: 145 MMHG | TEMPERATURE: 97 F | DIASTOLIC BLOOD PRESSURE: 89 MMHG | WEIGHT: 131 LBS | OXYGEN SATURATION: 99 %

## 2018-06-06 DIAGNOSIS — K62.3 RECTAL PROLAPSE: Primary | ICD-10-CM

## 2018-06-06 NOTE — PROGRESS NOTES
[facility]  [address] [place] [address]              Patient: Mike Chandra  Admitted: (Not on file) MRN: [id number]     Age:  60 y.o.,      Sex: female    YOB: 1960       Subjective    Ms. Sanford Sandoval is an 60 y.o. female who is here for her first postop visit. The patient was recently seen in clinic and was noted to have symptomatic rectal prolapse. Due to the severity of the disease process, surgical treatment was discussed for more definitive management. The patient underwent the following surgery on 5/23/2018 for rectal prolapse:         Perineal Proctectomy with Primary Coloanal Anastomosis ([place] Procedure)      The intraoperative findings showed a complete rectal prolapse of 5-7 cm.      The patient's post operative course was very satisfactory. The patient has been doing well at home. She is at present tolerating a regular diet, voiding without difficulty, and having essentially normal bowel function without any anal incontinence. The post operative pain is very minimal.           Objective    Vitals:    06/06/18 1314   BP: 145/89   Pulse: 68   Resp: 18   Temp: 97 °F (36.1 °C)   TempSrc: Oral   SpO2: 99%   Weight: 59.4 kg (131 lb)   Height: 5' 9\" (1.753 m)   PainSc:   0 - No pain       Physical Exam:  General: alert, cooperative, no distress, appears stated age  Abdomen: soft, non-tender  Anorectal:  With the patient in the prone position the anus appeared within normal limits. Digital rectal examination revealed Normal sphincter tone and squeeze pressure. Palpation revealed No Masses. Assessment / Plan    Ms. Sanford Sandoval is doing well postoperatively. The patient will return in 4 weeks for follow up.           Arabella Painter MD, FACS, FASCRS  Colon and Rectal Surgery  [facility]  Office [phone]  Fax     [phone]  6/6/2018  1:53 PM

## 2018-06-06 NOTE — PATIENT INSTRUCTIONS
If you have any questions or concerns about today's appointment, the verbal and/or written instructions you were given for follow up care, please call our office at [phone].     [facility] - [address]    [phone] office  [phone]

## 2018-06-06 NOTE — PROGRESS NOTES
1. Have you been to the ER, urgent care clinic since your last visit? Hospitalized since your last visit? No    2. Have you seen or consulted any other health care providers outside of the [facility] since your last visit? Include any pap smears or colon screening. No     Patient presents for post op from [facility] procedure on 5/23/18.

## 2018-06-06 NOTE — MR AVS SNAPSHOT
[facility] [place] 
 
 
 [address] [id number] 
[phone] Patient: Ric Frank MRN: [id number] B Visit Information Date & Time Provider Department Dept. Phone Encounter #  
 2018  2:00 PM Aubrey Lindsay MD [facility] [phone] [id number] Your Appointments 2018  2:00 PM  
POST OP with Aubrey Lindsay MD  
[address] [place] [address]) Appt Note: post op from  surgery [address] [facility] [address]  
  
   
 [id number] [address] Upcoming Health Maintenance Date Due  
 BREAST CANCER SCRN MAMMOGRAM 3/31/2009 FOBT Q 1 YEAR AGE 50-75 3/31/2009 PAP AKA CERVICAL CYTOLOGY 10/11/2015 Influenza Age 5 to Adult 2018 DTaP/Tdap/Td series (2 - Td) 2025 Allergies as of 2018  Review Complete On: 2018 By: Soraida Little LPN Severity Noted Reaction Type Reactions Codeine High   Nausea and Vomiting Current Immunizations  Reviewed on 2015 Name Date Pneumococcal Polysaccharide (PPSV-23) 2015  8:16 PM  
 Tdap 2015  2:59 PM  
  
 Not reviewed this visit Vitals BP Pulse Temp Resp Height(growth percentile) Weight(growth percentile) 145/89 68 97 °F (36.1 °C) (Oral) 18 5' 9\" (1.753 m) 131 lb (59.4 kg) SpO2 BMI OB Status Smoking Status 99% 19.35 kg/m2 Postmenopausal Current Every Day Smoker Vitals History BMI and BSA Data Body Mass Index Body Surface Area  
 19.35 kg/m 2 1.7 m 2 Preferred Pharmacy Pharmacy Name Phone [facility], [address] [place] [address] [phone] Your Updated Medication List  
  
   
This list is accurate as of 18  1:45 PM.  Always use your most recent med list.  
  
  
  
  
 ATIVAN 0.5 mg tablet Generic drug:  LORazepam  
Take  by mouth every eight (8) hours as needed for Anxiety. cyclobenzaprine 10 mg tablet Commonly known as:  FLEXERIL  
1/2 or one tid prn  
  
 diclofenac EC 75 mg EC tablet Commonly known as:  VOLTAREN Take 1 Tab by mouth two (2) times a day. FLUoxetine 40 mg capsule Commonly known as:  PROzac Take 1 Cap by mouth daily. oxybutynin 5 mg tablet Commonly known as:  GVPMCMMI Take 1 Tab by mouth three (3) times daily. oxyCODONE-acetaminophen 5-325 mg per tablet Commonly known as:  PERCOCET Take 1-2 Tabs by mouth every four (4) hours as needed for Pain. Max Daily Amount: 12 Tabs. Patient Instructions If you have any questions or concerns about today's appointment, the verbal and/or written instructions you were given for follow up care, please call our office at [phone]. [facility] - [address] 
 
[phone] office [phone] Introducing [facility]! [facility] introduces Metaps patient portal. Now you can access parts of your medical record, email your doctor's office, and request medication refills online. 1. In your internet browser, go to [email or web address] 2. Click on the First Time User? Click Here link in the Sign In box. You will see the New Member Sign Up page. 3. Enter your Metaps Access Code exactly as it appears below. You will not need to use this code after youve completed the sign-up process. If you do not sign up before the expiration date, you must request a new code. · Metaps Access Code: [email or web address] Expires: 7/22/2018 11:58 AM 
 
4. Enter the last four digits of your Social Security Number (xxxx) and Date of Birth (mm/dd/yyyy) as indicated and click Submit. You will be taken to the next sign-up page. 5. Create a Metaps ID.  This will be your Metaps login ID and cannot be changed, so think of one that is secure and easy to remember. 6. Create a [employer] password. You can change your password at any time. 7. Enter your Password Reset Question and Answer. This can be used at a later time if you forget your password. 8. Enter your e-mail address. You will receive e-mail notification when new information is available in [address] 9. Click Sign Up. You can now view and download portions of your medical record. 10. Click the Download Summary menu link to download a portable copy of your medical information. If you have questions, please visit the Frequently Asked Questions section of the [employer] website. Remember, [employer] is NOT to be used for urgent needs. For medical emergencies, dial 911. Now available from your iPhone and Android! Please provide this summary of care documentation to your next provider. Your primary care clinician is listed as [address]. If you have any questions after today's visit, please call [phone].

## 2018-06-06 NOTE — LETTER
NOTIFICATION RETURN TO WORK  
 
6/6/2018 1:38 PM 
 
Ms. Jamie East [address] [place] [phone] To Whom It May Concern: Jamie East is currently under the care of [facility] [address] [facility]. She is recovering from a recent surgery. Please excuse her from lifting greater than 30 lbs for 2 weeks. No squatting for 2 weeks. If there are questions or concerns please have the patient contact our office. Sincerely, Anton Oquendo MD

## 2018-06-21 NOTE — TELEPHONE ENCOUNTER
Refill request- patient needs hand written RX for [employer] [place]:  Prozac  Flexeril  Voltaren  Oxybutynin

## 2018-06-27 RX ORDER — DICLOFENAC SODIUM 75 MG/1
75 TABLET, DELAYED RELEASE ORAL
Qty: 60 TAB | Refills: 5 | Status: SHIPPED | OUTPATIENT
Start: 2018-06-27 | End: 2020-03-12

## 2018-06-27 RX ORDER — FLUOXETINE HYDROCHLORIDE 40 MG/1
40 CAPSULE ORAL DAILY
Qty: 30 CAP | Refills: 5 | Status: SHIPPED | OUTPATIENT
Start: 2018-06-27 | End: 2021-03-26

## 2018-06-27 RX ORDER — CYCLOBENZAPRINE HCL 10 MG
TABLET ORAL
Qty: 30 TAB | Refills: 1 | Status: SHIPPED | OUTPATIENT
Start: 2018-06-27 | End: 2018-08-01 | Stop reason: SDUPTHER

## 2018-06-27 RX ORDER — OXYBUTYNIN CHLORIDE 5 MG/1
5 TABLET ORAL 3 TIMES DAILY
Qty: 60 TAB | Refills: 5 | Status: SHIPPED | OUTPATIENT
Start: 2018-06-27 | End: 2020-03-12

## 2018-08-02 RX ORDER — CYCLOBENZAPRINE HCL 10 MG
TABLET ORAL
Qty: 30 TAB | Refills: 1 | Status: SHIPPED | OUTPATIENT
Start: 2018-08-02 | End: 2020-03-12

## 2018-08-17 ENCOUNTER — HOSPITAL ENCOUNTER (EMERGENCY)
Age: 58
Discharge: HOME | End: 2018-08-17
Attending: EMERGENCY MEDICINE
Payer: SUBSIDIZED

## 2018-08-17 VITALS
WEIGHT: 115 LBS | RESPIRATION RATE: 16 BRPM | SYSTOLIC BLOOD PRESSURE: 132 MMHG | DIASTOLIC BLOOD PRESSURE: 89 MMHG | BODY MASS INDEX: 16.98 KG/M2 | HEART RATE: 75 BPM | TEMPERATURE: 98.1 F | OXYGEN SATURATION: 99 %

## 2018-08-17 DIAGNOSIS — R42 DIZZINESS: Primary | ICD-10-CM

## 2018-08-17 DIAGNOSIS — R19.7 DIARRHEA, UNSPECIFIED TYPE: ICD-10-CM

## 2018-08-17 DIAGNOSIS — R21 RASH OF NECK: ICD-10-CM

## 2018-08-17 LAB
ANION GAP SERPL CALC-SCNC: 6 MMOL/L (ref 3–18)
APPEARANCE UR: CLEAR
BASOPHILS # BLD: 0 K/UL (ref 0–0.1)
BASOPHILS NFR BLD: 0 % (ref 0–2)
BILIRUB UR QL: NEGATIVE
BUN SERPL-MCNC: 15 MG/DL (ref 7–18)
BUN/CREAT SERPL: 23 (ref 12–20)
CALCIUM SERPL-MCNC: 9.5 MG/DL (ref 8.5–10.1)
CHLORIDE SERPL-SCNC: 108 MMOL/L (ref 100–108)
CO2 SERPL-SCNC: 28 MMOL/L (ref 21–32)
COLOR UR: ABNORMAL
CREAT SERPL-MCNC: 0.66 MG/DL (ref 0.6–1.3)
DIFFERENTIAL METHOD BLD: NORMAL
EOSINOPHIL # BLD: 0.2 K/UL (ref 0–0.4)
EOSINOPHIL NFR BLD: 3 % (ref 0–5)
ERYTHROCYTE [DISTWIDTH] IN BLOOD BY AUTOMATED COUNT: 13.5 % (ref 11.6–14.5)
GLUCOSE SERPL-MCNC: 86 MG/DL (ref 74–99)
GLUCOSE UR STRIP.AUTO-MCNC: NEGATIVE MG/DL
HCT VFR BLD AUTO: 39 % (ref 35–45)
HGB BLD-MCNC: 13.2 G/DL (ref 12–16)
HGB UR QL STRIP: NEGATIVE
KETONES UR QL STRIP.AUTO: ABNORMAL MG/DL
LEUKOCYTE ESTERASE UR QL STRIP.AUTO: NEGATIVE
LYMPHOCYTES # BLD: 2.6 K/UL (ref 0.9–3.6)
LYMPHOCYTES NFR BLD: 34 % (ref 21–52)
MCH RBC QN AUTO: 31.4 PG (ref 24–34)
MCHC RBC AUTO-ENTMCNC: 33.8 G/DL (ref 31–37)
MCV RBC AUTO: 92.9 FL (ref 74–97)
MONOCYTES # BLD: 0.7 K/UL (ref 0.05–1.2)
MONOCYTES NFR BLD: 9 % (ref 3–10)
NEUTS SEG # BLD: 4.1 K/UL (ref 1.8–8)
NEUTS SEG NFR BLD: 54 % (ref 40–73)
NITRITE UR QL STRIP.AUTO: NEGATIVE
PH UR STRIP: 5.5 [PH] (ref 5–8)
PLATELET # BLD AUTO: 405 K/UL (ref 135–420)
PMV BLD AUTO: 9.5 FL (ref 9.2–11.8)
POTASSIUM SERPL-SCNC: 4.5 MMOL/L (ref 3.5–5.5)
PROT UR STRIP-MCNC: NEGATIVE MG/DL
RBC # BLD AUTO: 4.2 M/UL (ref 4.2–5.3)
SODIUM SERPL-SCNC: 142 MMOL/L (ref 136–145)
SP GR UR REFRACTOMETRY: 1.02 (ref 1–1.03)
UROBILINOGEN UR QL STRIP.AUTO: 1 EU/DL (ref 0.2–1)
WBC # BLD AUTO: 7.6 K/UL (ref 4.6–13.2)

## 2018-08-17 PROCEDURE — 99284 EMERGENCY DEPT VISIT MOD MDM: CPT

## 2018-08-17 PROCEDURE — 81003 URINALYSIS AUTO W/O SCOPE: CPT | Performed by: EMERGENCY MEDICINE

## 2018-08-17 PROCEDURE — 85025 COMPLETE CBC W/AUTO DIFF WBC: CPT | Performed by: EMERGENCY MEDICINE

## 2018-08-17 PROCEDURE — 80048 BASIC METABOLIC PNL TOTAL CA: CPT | Performed by: EMERGENCY MEDICINE

## 2018-08-17 PROCEDURE — 74011250637 HC RX REV CODE- 250/637: Performed by: EMERGENCY MEDICINE

## 2018-08-17 PROCEDURE — 93005 ELECTROCARDIOGRAM TRACING: CPT

## 2018-08-17 PROCEDURE — 74011250636 HC RX REV CODE- 250/636: Performed by: EMERGENCY MEDICINE

## 2018-08-17 PROCEDURE — 96360 HYDRATION IV INFUSION INIT: CPT

## 2018-08-17 RX ORDER — ACETAMINOPHEN 325 MG/1
650 TABLET ORAL
Qty: 20 TAB | Refills: 0 | Status: SHIPPED | OUTPATIENT
Start: 2018-08-17 | End: 2020-03-12

## 2018-08-17 RX ORDER — IBUPROFEN 600 MG/1
600 TABLET ORAL
Qty: 20 TAB | Refills: 0 | Status: SHIPPED | OUTPATIENT
Start: 2018-08-17 | End: 2018-10-06

## 2018-08-17 RX ORDER — IBUPROFEN 600 MG/1
600 TABLET ORAL
Status: COMPLETED | OUTPATIENT
Start: 2018-08-17 | End: 2018-08-17

## 2018-08-17 RX ORDER — ACETAMINOPHEN 500 MG
1000 TABLET ORAL
Status: COMPLETED | OUTPATIENT
Start: 2018-08-17 | End: 2018-08-17

## 2018-08-17 RX ORDER — LIDOCAINE 50 MG/G
PATCH TOPICAL
Qty: 4 EACH | Refills: 0 | Status: SHIPPED | OUTPATIENT
Start: 2018-08-17 | End: 2020-03-12

## 2018-08-17 RX ADMIN — IBUPROFEN 600 MG: 600 TABLET ORAL at 11:19

## 2018-08-17 RX ADMIN — SODIUM CHLORIDE 1000 ML: 900 INJECTION, SOLUTION INTRAVENOUS at 11:20

## 2018-08-17 RX ADMIN — ACETAMINOPHEN 1000 MG: 500 TABLET, FILM COATED ORAL at 11:20

## 2018-08-17 NOTE — ED NOTES
I have reviewed discharge instructions with the patient. The patient verbalized understanding of instructions and prescriptions. Patient ambulated independently out of care area.

## 2018-08-17 NOTE — ED PROVIDER NOTES
EMERGENCY DEPARTMENT HISTORY AND PHYSICAL EXAM    11:11 AM      Date: 8/17/2018  Patient Name: Linda Wang    History of Presenting Illness     Chief Complaint   Patient presents with    Diarrhea    Dizziness         History Provided By: Patient    Chief Complaint: Gait Problem  Duration: 1 Weeks  Timing:  Intermittent  Location: N/A  Quality: Stumbling  Severity: Moderate  Modifying Factors: Better after ibuprofen  Associated Symptoms: diarrea, lightheadedness and nausea      Additional History (Context): 11:16 AM Linda Wang is a 60 y.o. female with h/o HTN and COPD who presents to ED complaining of moderate intermittent stumbling gait problems associated with diarrhea, lightheadedness, and nausea onset 1 week ago. Patient states she \"stumbles\" when she walks for about a week now. She states the stumbling episodes on last for a second or two and comes on sporadically. Patient states that the diarrhea, lightheadedness, and nausea began yesterday while at work. She also complains of lower back pain and a left sided neck rash that is chronic. She states she feels better this morning after taking ibuprofen. Denies any blood in stool or ETOH use. Admits to smoking a pack per day. Also denies weakness and incontinence. No other concerns or symptoms at this time. PCP: Malick Gonsales MD    Current Facility-Administered Medications   Medication Dose Route Frequency Provider Last Rate Last Dose    sodium chloride 0.9 % bolus infusion 1,000 mL  1,000 mL IntraVENous ONCE Sylvia Ricardo MD 1,000 mL/hr at 08/17/18 1120 1,000 mL at 08/17/18 1120     Current Outpatient Prescriptions   Medication Sig Dispense Refill    ibuprofen (MOTRIN) 600 mg tablet Take 1 Tab by mouth every six (6) hours as needed for Pain. 20 Tab 0    acetaminophen (TYLENOL) 325 mg tablet Take 2 Tabs by mouth every four (4) hours as needed for Pain.  20 Tab 0    lidocaine (LIDODERM) 5 % Apply patch to the affected area for 12 hours a day and remove for 12 hours a day. 4 Each 0    cyclobenzaprine (FLEXERIL) 10 mg tablet TAKE 1/2 TO 1 TABLET BY MOUTH 3 TIMES DAILY AS NEEDED 30 Tab 1    oxybutynin (DITROPAN) 5 mg tablet Take 1 Tab by mouth three (3) times daily. 60 Tab 5    FLUoxetine (PROZAC) 40 mg capsule Take 1 Cap by mouth daily. 30 Cap 5    diclofenac EC (VOLTAREN) 75 mg EC tablet Take 1 Tab by mouth two (2) times daily as needed. 60 Tab 5    oxyCODONE-acetaminophen (PERCOCET) 5-325 mg per tablet Take 1-2 Tabs by mouth every four (4) hours as needed for Pain. Max Daily Amount: 12 Tabs. 20 Tab 0    LORazepam (ATIVAN) 0.5 mg tablet Take  by mouth every eight (8) hours as needed for Anxiety. Past History     Past Medical History:  Past Medical History:   Diagnosis Date    Alcohol abuse     States she has quit    Anxiety     Chronic obstructive pulmonary disease ([facility] [address]     Depression     Essential hypertension     Hypertension     Psychiatric disorder     Tobacco abuse        Past Surgical History:  Past Surgical History:   Procedure Laterality Date    COLONOSCOPY N/A 2018    COLONOS[facility]idez [facility] at [facility] ENDOSCOPY    HX GYN             Family History:  Family History   Problem Relation Age of Onset    Diabetes Mother     Hypertension Mother     Heart Attack Mother     Hypertension Father     Diabetes Brother        Social History:  Social History   Substance Use Topics    Smoking status: Current Every Day Smoker     Packs/day: 0.50    Smokeless tobacco: Never Used    Alcohol use No      Comment: States she has quit       Allergies: Allergies   Allergen Reactions    Codeine Nausea and Vomiting         Review of Systems       Review of Systems   Constitutional: Negative for activity change, fatigue and fever. HENT: Negative for congestion and rhinorrhea. Eyes: Negative for visual disturbance. Respiratory: Negative for shortness of breath.     Cardiovascular: Negative for chest pain and palpitations. Gastrointestinal: Positive for diarrhea and nausea. Negative for abdominal pain, blood in stool and vomiting. Genitourinary: Negative for dysuria and hematuria. Musculoskeletal: Positive for back pain and gait problem. Skin: Positive for rash. Neurological: Positive for light-headedness. Negative for dizziness and weakness. Psychiatric/Behavioral: Negative for agitation. All other systems reviewed and are negative. Physical Exam     Visit Vitals    /89 (BP 1 Location: Right arm, BP Patient Position: At rest)    Pulse 75    Temp 98.1 °F (36.7 °C)    Resp 16    Wt 52.2 kg (115 lb)    SpO2 99%    BMI 16.98 kg/m2         Physical Exam   Constitutional: She appears well-developed and well-nourished. No distress. HENT:   Head: Normocephalic and atraumatic. Right Ear: External ear normal.   Left Ear: External ear normal.   Nose: Nose normal.   Mouth/Throat: Oropharynx is clear and moist.   Eyes: Conjunctivae and EOM are normal. Pupils are equal, round, and reactive to light. No scleral icterus. Neck: Normal range of motion. Neck supple. No JVD present. No tracheal deviation present. No thyromegaly present. Cardiovascular: Normal rate and regular rhythm. Exam reveals no friction rub. No murmur heard. Pulmonary/Chest: Effort normal and breath sounds normal. No stridor. She exhibits no tenderness. Abdominal: Soft. Bowel sounds are normal. She exhibits no distension. There is no tenderness. There is no rebound and no guarding. Musculoskeletal: Normal range of motion. She exhibits no edema or tenderness. Lymphadenopathy:     She has no cervical adenopathy. Neurological: She is alert. No cranial nerve deficit. Coordination normal.   Skin: Skin is warm and dry. Psychiatric: She has a normal mood and affect. Her behavior is normal. Judgment and thought content normal.   Nursing note and vitals reviewed.         Diagnostic Study Results     Labs -  Recent Results (from the past 12 hour(s))   CBC WITH AUTOMATED DIFF    Collection Time: 08/17/18  9:00 AM   Result Value Ref Range    WBC 7.6 4.6 - 13.2 K/uL    RBC 4.20 4. 20 - 5.30 M/uL    HGB 13.2 12.0 - 16.0 g/dL    HCT 39.0 35.0 - 45.0 %    MCV 92.9 74.0 - 97.0 FL    MCH 31.4 24.0 - 34.0 PG    MCHC 33.8 31.0 - 37.0 g/dL    RDW 13.5 11.6 - 14.5 %    PLATELET 355 054 - 787 K/uL    MPV 9.5 9.2 - 11.8 FL    NEUTROPHILS 54 40 - 73 %    LYMPHOCYTES 34 21 - 52 %    MONOCYTES 9 3 - 10 %    EOSINOPHILS 3 0 - 5 %    BASOPHILS 0 0 - 2 %    ABS. NEUTROPHILS 4.1 1.8 - 8.0 K/UL    ABS. LYMPHOCYTES 2.6 0.9 - 3.6 K/UL    ABS. MONOCYTES 0.7 0.05 - 1.2 K/UL    ABS. EOSINOPHILS 0.2 0.0 - 0.4 K/UL    ABS.  BASOPHILS 0.0 0.0 - 0.1 K/UL    DF AUTOMATED     METABOLIC PANEL, BASIC    Collection Time: 08/17/18  9:00 AM   Result Value Ref Range    Sodium 142 136 - 145 mmol/L    Potassium 4.5 3.5 - 5.5 mmol/L    Chloride 108 100 - 108 mmol/L    CO2 28 21 - 32 mmol/L    Anion gap 6 3.0 - 18 mmol/L    Glucose 86 74 - 99 mg/dL    BUN 15 7.0 - 18 MG/DL    Creatinine 0.66 0.6 - 1.3 MG/DL    BUN/Creatinine ratio 23 (H) 12 - 20      GFR est AA >60 >60 ml/min/1.73m2    GFR est non-AA >60 >60 ml/min/1.73m2    Calcium 9.5 8.5 - 10.1 MG/DL   URINALYSIS W/ RFLX MICROSCOPIC    Collection Time: 08/17/18  9:00 AM   Result Value Ref Range    Color DARK YELLOW      Appearance CLEAR      Specific gravity 1.023 1.005 - 1.030      pH (UA) 5.5 5.0 - 8.0      Protein NEGATIVE  NEG mg/dL    Glucose NEGATIVE  NEG mg/dL    Ketone TRACE (A) NEG mg/dL    Bilirubin NEGATIVE  NEG      Blood NEGATIVE  NEG      Urobilinogen 1.0 0.2 - 1.0 EU/dL    Nitrites NEGATIVE  NEG      Leukocyte Esterase NEGATIVE  NEG     EKG, 12 LEAD, INITIAL    Collection Time: 08/17/18  9:06 AM   Result Value Ref Range    Ventricular Rate 61 BPM    Atrial Rate 61 BPM    P-R Interval 132 ms    QRS Duration 76 ms    Q-T Interval 460 ms    QTC Calculation (Bezet) 463 ms    Calculated P Axis 70 degrees    Calculated R Axis 65 degrees    Calculated T Axis 66 degrees    Diagnosis       Normal sinus rhythm  Normal ECG  When compared with ECG of 18-MAY-2018 10:06,  No significant change was found         Radiologic Studies -   No orders to display         Medical Decision Making   I am the first provider for this patient. I reviewed the vital signs, available nursing notes, past medical history, past surgical history, family history and social history. Vital Signs-Reviewed the patient's vital signs. Pulse Oximetry Analysis -  100% on room air , WNL    EKG: Interpreted by the EP. Time Interpreted: 9:06 am   Rate: 61 bpm   Rhythm: Normal Sinus Rhythm    Interpretation: Normal axis and intervals. No acute ischemic abnormalities or sign of dysrhythmia. Records Reviewed: Nursing Notes and Old Medical Records (Time of Review: 11:11 AM)    ED Course: Progress Notes, Reevaluation, and Consults:  11:24 AM I have assessed the patient who will be discharged in stable condition. Patient is to return to emergency department if any new or worsening condition. I have given the patient instructions for discharge and follow-up. Patient understands and verbalizes agreement with plan, diagnosis, discharge, and follow-up. Patient ambulating without any difficulty. Patient in no distress. No chest pain shortness of breath or abdominal pain. All lab findings discussed and also explained the unclear patient's rash and needs to see a dermatologist.  No acute sign of infection folliculitis no particular glands that were related to lymph chain or lymphadenopathy. No sign of lymphadenitis. Patient agrees and understands that this is something that needs to be followed up and will call the number provided. Provider Notes (Medical Decision Making):   Patient with diarrhea, nonspecific gait disturbance lasting only seconds. No continuous unsteady gait, weakness, or vertigo. Chronic back pain per patient. Lightheadedness and nausea was only yesterday. Will check labs, urine, hydrate and give pain management. Diagnosis     Clinical Impression:   1. Dizziness    2. Diarrhea, unspecified type    3. Rash of neck        Disposition: DC    Follow-up Information     Follow up With Details Comments Contact Info    Ronny Martinez MD Call in 1 day Follow Up From Emergency Department [address] [facility] [address] [facility] EMERGENCY DEPT  If symptoms worsen [address]  [phone]    Liv Jorge MD Call in 1 day  [address] [facility] [address] [facility]  [phone]             Patient's Medications   Start Taking    ACETAMINOPHEN (TYLENOL) 325 MG TABLET    Take 2 Tabs by mouth every four (4) hours as needed for Pain. IBUPROFEN (MOTRIN) 600 MG TABLET    Take 1 Tab by mouth every six (6) hours as needed for Pain. LIDOCAINE (LIDODERM) 5 %    Apply patch to the affected area for 12 hours a day and remove for 12 hours a day. Continue Taking    CYCLOBENZAPRINE (FLEXERIL) 10 MG TABLET    TAKE 1/2 TO 1 TABLET BY MOUTH 3 TIMES DAILY AS NEEDED    DICLOFENAC EC (VOLTAREN) 75 MG EC TABLET    Take 1 Tab by mouth two (2) times daily as needed. FLUOXETINE (PROZAC) 40 MG CAPSULE    Take 1 Cap by mouth daily. LORAZEPAM (ATIVAN) 0.5 MG TABLET    Take  by mouth every eight (8) hours as needed for Anxiety. OXYBUTYNIN (DITROPAN) 5 MG TABLET    Take 1 Tab by mouth three (3) times daily. OXYCODONE-ACETAMINOPHEN (PERCOCET) 5-325 MG PER TABLET    Take 1-2 Tabs by mouth every four (4) hours as needed for Pain. Max Daily Amount: 12 Tabs.    These Medications have changed    No medications on file   Stop Taking    No medications on file     _______________________________    Attestations:  Madelaine Addison acting as a scribe for and in the presence of Irlanda Cross MD      August 17, 2018 at 11:11 AM       Provider Attestation:      I personally performed the services described in the documentation, reviewed the documentation, as recorded by the scribe in my presence, and it accurately and completely records my words and actions.  August 17, 2018 at 11:11 AM - Unique Gutierrez MD    _______________________________

## 2018-08-17 NOTE — DISCHARGE INSTRUCTIONS
Diarrhea: Care Instructions  Your Care Instructions    Diarrhea is loose, watery stools (bowel movements). The exact cause is often hard to find. Sometimes diarrhea is your body's way of getting rid of what caused an upset stomach. Viruses, food poisoning, and many medicines can cause diarrhea. Some people get diarrhea in response to emotional stress, anxiety, or certain foods. Almost everyone has diarrhea now and then. It usually isn't serious, and your stools will return to normal soon. The important thing to do is replace the fluids you have lost, so you can prevent dehydration. The doctor has checked you carefully, but problems can develop later. If you notice any problems or new symptoms, get medical treatment right away. Follow-up care is a key part of your treatment and safety. Be sure to make and go to all appointments, and call your doctor if you are having problems. It's also a good idea to know your test results and keep a list of the medicines you take. How can you care for yourself at home? · Watch for signs of dehydration, which means your body has lost too much water. Dehydration is a serious condition and should be treated right away. Signs of dehydration are:  ¨ Increasing thirst and dry eyes and mouth. ¨ Feeling faint or lightheaded. ¨ Darker urine, and a smaller amount of urine than normal.  · To prevent dehydration, drink plenty of fluids, enough so that your urine is light yellow or clear like water. Choose water and other caffeine-free clear liquids until you feel better. If you have kidney, heart, or liver disease and have to limit fluids, talk with your doctor before you increase the amount of fluids you drink. · Begin eating small amounts of mild foods the next day, if you feel like it. ¨ Try yogurt that has live cultures of Lactobacillus. (Check the label.)  ¨ Avoid spicy foods, fruits, alcohol, and caffeine until 48 hours after all symptoms are gone.   ¨ Avoid chewing gum that contains sorbitol. ¨ Avoid dairy products (except for yogurt with Lactobacillus) while you have diarrhea and for 3 days after symptoms are gone. · The doctor may recommend that you take over-the-counter medicine, such as loperamide (Imodium), if you still have diarrhea after 6 hours. Read and follow all instructions on the label. Do not use this medicine if you have bloody diarrhea, a high fever, or other signs of serious illness. Call your doctor if you think you are having a problem with your medicine. When should you call for help? Call 911 anytime you think you may need emergency care. For example, call if:    · You passed out (lost consciousness).     · Your stools are maroon or very bloody.    Call your doctor now or seek immediate medical care if:    · You are dizzy or lightheaded, or you feel like you may faint.     · Your stools are black and look like tar, or they have streaks of blood.     · You have new or worse belly pain.     · You have symptoms of dehydration, such as:  ¨ Dry eyes and a dry mouth. ¨ Passing only a little dark urine. ¨ Feeling thirstier than usual.     · You have a new or higher fever.    Watch closely for changes in your health, and be sure to contact your doctor if:    · Your diarrhea is getting worse.     · You see pus in the diarrhea.     · You are not getting better after 2 days (48 hours). Where can you learn more? Go to http://charles-bernie.info/. Enter F826 in the search box to learn more about \"Diarrhea: Care Instructions. \"  Current as of: November 20, 2017  Content Version: 11.7  © 0127-9527 [employer]. Care instructions adapted under license by [employer] (which disclaims liability or warranty for this information).  If you have questions about a medical condition or this instruction, always ask your healthcare professional. [place] [address] any warranty or liability for your use of this information. Lightheadedness or Faintness: Care Instructions  Your Care Instructions  Lightheadedness is a feeling that you are about to faint or \"pass out. \" You do not feel as if you or your surroundings are moving. It is different from vertigo, which is the feeling that you or things around you are spinning or tilting. Lightheadedness usually goes away or gets better when you lie down. If lightheadedness gets worse, it can lead to a fainting spell. It is common to feel lightheaded from time to time. Lightheadedness usually is not caused by a serious problem. It often is caused by a short-lasting drop in blood pressure and blood flow to your head that occurs when you get up too quickly from a seated or lying position. Follow-up care is a key part of your treatment and safety. Be sure to make and go to all appointments, and call your doctor if you are having problems. It's also a good idea to know your test results and keep a list of the medicines you take. How can you care for yourself at home? · Lie down for 1 or 2 minutes when you feel lightheaded. After lying down, sit up slowly and remain sitting for 1 to 2 minutes before slowly standing up. · Avoid movements, positions, or activities that have made you lightheaded in the past.  · Get plenty of rest, especially if you have a cold or flu, which can cause lightheadedness. · Make sure you drink plenty of fluids, especially if you have a fever or have been sweating. · Do not drive or put yourself and others in danger while you feel lightheaded. When should you call for help? Call 911 anytime you think you may need emergency care. For example, call if:    · You have symptoms of a stroke. These may include:  ¨ Sudden numbness, tingling, weakness, or loss of movement in your face, arm, or leg, especially on only one side of your body. ¨ Sudden vision changes. ¨ Sudden trouble speaking.   ¨ Sudden confusion or trouble understanding simple statements. ¨ Sudden problems with walking or balance. ¨ A sudden, severe headache that is different from past headaches.     · You have symptoms of a heart attack. These may include:  ¨ Chest pain or pressure, or a strange feeling in the chest.  ¨ Sweating. ¨ Shortness of breath. ¨ Nausea or vomiting. ¨ Pain, pressure, or a strange feeling in the back, neck, jaw, or upper belly or in one or both shoulders or arms. ¨ Lightheadedness or sudden weakness. ¨ A fast or irregular heartbeat. After you call 911, the  may tell you to chew 1 adult-strength or 2 to 4 low-dose aspirin. Wait for an ambulance. Do not try to drive yourself.    Watch closely for changes in your health, and be sure to contact your doctor if:    · Your lightheadedness gets worse or does not get better with home care. Where can you learn more? Go to http://charlesGLObernie.info/. Enter B675 in the search box to learn more about \"Lightheadedness or Faintness: Care Instructions. \"  Current as of: November 20, 2017  Content Version: 11.7  © 0199-4930 [employer]. Care instructions adapted under license by [employer] (which disclaims liability or warranty for this information). If you have questions about a medical condition or this instruction, always ask your healthcare professional. [address] any warranty or liability for your use of this information. Rash: Care Instructions  Your Care Instructions  A rash is any irritation or inflammation of the skin. Rashes have many possible causes, including allergy, infection, illness, heat, and emotional stress. Follow-up care is a key part of your treatment and safety. Be sure to make and go to all appointments, and call your doctor if you are having problems. It's also a good idea to know your test results and keep a list of the medicines you take. How can you care for yourself at home?   · Wash the area with water only. Soap can make dryness and itching worse. Pat dry. · Put cold, wet cloths on the rash to reduce itching. · Keep cool, and stay out of the sun. · Leave the rash open to the air as much of the time as possible. · Sometimes petroleum jelly (Vaseline) can help relieve the discomfort caused by a rash. A moisturizing lotion, such as Cetaphil, also may help. Calamine lotion may help for rashes caused by contact with something (such as a plant or soap) that irritated the skin. Use it 3 or 4 times a day. · If your doctor prescribed a cream, use it as directed. If your doctor prescribed medicine, take it exactly as directed. · If your rash itches so badly that it interferes with your normal activities, take an over-the-counter antihistamine, such as diphenhydramine (Benadryl) or loratadine (Claritin). Read and follow all instructions on the label. When should you call for help? Call your doctor now or seek immediate medical care if:    · You have signs of infection, such as:  ¨ Increased pain, swelling, warmth, or redness. ¨ Red streaks leading from the area. ¨ Pus draining from the area. ¨ A fever.     · You have joint pain along with the rash.    Watch closely for changes in your health, and be sure to contact your doctor if:    · Your rash is changing or getting worse. For example, call if you have pain along with the rash, the rash is spreading, or you have new blisters.     · You do not get better after 1 week. Where can you learn more? Go to http://charles-bernie.info/. Enter S860 in the search box to learn more about \"Rash: Care Instructions. \"  Current as of: October 5, 2017  Content Version: 11.7  © 8097-8275 [employer]. Care instructions adapted under license by [employer] (which disclaims liability or warranty for this information).  If you have questions about a medical condition or this instruction, always ask your healthcare professional. [employer] disclaims any warranty or liability for your use of this information.

## 2018-08-19 LAB
ATRIAL RATE: 61 BPM
CALCULATED P AXIS, ECG09: 70 DEGREES
CALCULATED R AXIS, ECG10: 65 DEGREES
CALCULATED T AXIS, ECG11: 66 DEGREES
DIAGNOSIS, 93000: NORMAL
P-R INTERVAL, ECG05: 132 MS
Q-T INTERVAL, ECG07: 460 MS
QRS DURATION, ECG06: 76 MS
QTC CALCULATION (BEZET), ECG08: 463 MS
VENTRICULAR RATE, ECG03: 61 BPM

## 2018-08-20 ENCOUNTER — PATIENT OUTREACH (OUTPATIENT)
Dept: FAMILY MEDICINE CLINIC | Age: 58
End: 2018-08-20

## 2018-08-20 NOTE — LETTER
8/28/2018 11:26 AM 
 
Ms. Robyn Burr [address] [facility] [address] [id number] Dear Robyn Sinclair A review of your chart has revealed that it time for a follow up for your thyroid. You may call to schedule an appointment or come early as a walk in to be seen. We can be reached at [phone]. I have included the [facility] schedule for your convenience. Thank you for allowing us to participate in your care.  
 
 
 
 
Sincerely, 
 
 
Gary Mirza RN

## 2018-08-28 NOTE — PROGRESS NOTES
ED Discharge Follow-Up Date/Time:     8/28/2018 11:33 AM 
 
Patient presented to [facility]  ED on 8/17/18 and was diagnosed with Dizziness, Diarrhea, Rash of Neck. Unable to reach patient by phone. Letter mailed to patient to follow up for thyroid monitoring. 10/1/18- Letter was returned.

## 2018-10-04 ENCOUNTER — HOSPITAL ENCOUNTER (EMERGENCY)
Age: 58
Discharge: HOME | End: 2018-10-04
Attending: EMERGENCY MEDICINE
Payer: SUBSIDIZED

## 2018-10-04 VITALS
BODY MASS INDEX: 19.11 KG/M2 | HEIGHT: 69 IN | HEART RATE: 100 BPM | SYSTOLIC BLOOD PRESSURE: 179 MMHG | TEMPERATURE: 98.4 F | OXYGEN SATURATION: 98 % | WEIGHT: 129 LBS | RESPIRATION RATE: 17 BRPM | DIASTOLIC BLOOD PRESSURE: 111 MMHG

## 2018-10-04 DIAGNOSIS — J01.90 ACUTE SINUSITIS, RECURRENCE NOT SPECIFIED, UNSPECIFIED LOCATION: Primary | ICD-10-CM

## 2018-10-04 DIAGNOSIS — R03.0 ELEVATED BLOOD PRESSURE READING: ICD-10-CM

## 2018-10-04 PROCEDURE — 99282 EMERGENCY DEPT VISIT SF MDM: CPT

## 2018-10-04 RX ORDER — MOMETASONE FUROATE 50 UG/1
2 SPRAY, METERED NASAL DAILY
Qty: 1 CONTAINER | Refills: 0 | Status: SHIPPED | OUTPATIENT
Start: 2018-10-04 | End: 2020-03-12

## 2018-10-04 RX ORDER — AZITHROMYCIN 250 MG/1
TABLET, FILM COATED ORAL
Qty: 6 TAB | Refills: 0 | Status: SHIPPED | OUTPATIENT
Start: 2018-10-04 | End: 2018-10-09

## 2018-10-04 NOTE — Clinical Note
Increase fluids Allegra or Zyrtec Daily Mucinex  or similar product Take Tylenol/Acetaminophen (every 4-6 hours) and/or Motrin/Ibuprofen/Advil (every 6-8 hours)  for fever or pain as needed. Follow up with your primary care provider or the provided r eferral for further evaluation and management Increase fluids Return to emergency room at once for worsening or new symptoms.

## 2018-10-04 NOTE — ED PROVIDER NOTES
EMERGENCY DEPARTMENT HISTORY AND PHYSICAL EXAM 
 
2:44 PM 
 
 
Date: 10/4/2018 Patient Name: Jessica Jones History of Presenting Illness Chief Complaint Patient presents with  Sinus Pain  Chest Congestion History Provided By: Patient Chief Complaint: sinus pain Duration:  Weeks Timing:  Constant Location: sinus Quality: Pressure Severity: Moderate Modifying Factors: OTC meds with little relief Associated Symptoms: cough, chills, Additional History (Context): Jessica Jones is a 60 y.o. female with hypertension, COPD and Alcohol abuse who presents with sinsus congestion for the past 3 weeks. States also has cough and chills. Denies fever or n/v. Has been taking OTC meds with little relief. PCP: Karuna Richards MD 
 
Current Outpatient Prescriptions Medication Sig Dispense Refill  azithromycin (ZITHROMAX) 250 mg tablet Take as directed 6 Tab 0  
 mometasone (NASONEX) 50 mcg/actuation nasal spray 2 Sprays by Both Nostrils route daily. 1 Container 0  
 ibuprofen (MOTRIN) 600 mg tablet Take 1 Tab by mouth every six (6) hours as needed for Pain. 20 Tab 0  
 acetaminophen (TYLENOL) 325 mg tablet Take 2 Tabs by mouth every four (4) hours as needed for Pain. 20 Tab 0  
 lidocaine (LIDODERM) 5 % Apply patch to the affected area for 12 hours a day and remove for 12 hours a day. 4 Each 0  
 cyclobenzaprine (FLEXERIL) 10 mg tablet TAKE 1/2 TO 1 TABLET BY MOUTH 3 TIMES DAILY AS NEEDED 30 Tab 1  
 oxybutynin (DITROPAN) 5 mg tablet Take 1 Tab by mouth three (3) times daily. 60 Tab 5  FLUoxetine (PROZAC) 40 mg capsule Take 1 Cap by mouth daily. 30 Cap 5  
 diclofenac EC (VOLTAREN) 75 mg EC tablet Take 1 Tab by mouth two (2) times daily as needed. 60 Tab 5  
 oxyCODONE-acetaminophen (PERCOCET) 5-325 mg per tablet Take 1-2 Tabs by mouth every four (4) hours as needed for Pain. Max Daily Amount: 12 Tabs.  20 Tab 0  
  LORazepam (ATIVAN) 0.5 mg tablet Take  by mouth every eight (8) hours as needed for Anxiety. Past History Past Medical History: 
Past Medical History:  
Diagnosis Date  Alcohol abuse States she has quit  Anxiety  Chronic obstructive pulmonary disease ([facility] [address]  Depression  Essential hypertension  Hypertension  Psychiatric disorder  Tobacco abuse Past Surgical History: 
Past Surgical History:  
Procedure Laterality Date  COLONOSCOPY N/A 2018 COLONOSCOPY performed by Patricio Brennan MD at [address]  [facility]    
  Family History: 
Family History Problem Relation Age of Onset  Diabetes Mother  Hypertension Mother  Heart Attack Mother  Hypertension Father  Diabetes Brother Social History: 
Social History Substance Use Topics  Smoking status: Current Every Day Smoker Packs/day: 0.50  Smokeless tobacco: Never Used  Alcohol use No  
   Comment: States she has quit Allergies: Allergies Allergen Reactions  Codeine Nausea and Vomiting Review of Systems Constitutional:  chills. Head:  Denies injury. Face:  Denies injury or pain. ENMT: Sinus congestion, Denies sore throat. Neck:  Denies injury or pain. Chest:  Denies injury. Cardiac:  Denies chest pain or palpitations. Respiratory:  cough,  Denies wheezing, difficulty breathing, shortness of breath. GI/ABD:  Denies injury, pain, distention, nausea, vomiting, diarrhea. :  Denies injury, pain, dysuria or urgency. Back:  Denies injury or pain. Pelvis:  Denies injury or pain. Extremity/MS:  Denies injury or pain. Neuro:  Denies headache, LOC, dizziness, neurologic symptoms/deficits/paresthesias. Skin: Denies injury, rash, itching or skin changes. Physical Exam  
 
Visit Vitals  BP (!) 179/111 (BP 1 Location: Right arm, BP Patient Position: At rest)  Pulse 100  Temp 98.4 °F (36.9 °C)  Resp 17  Ht 5' 9\" (1.753 m)  Wt 58.5 kg (129 lb)  SpO2 98%  BMI 19.05 kg/m2 CONSTITUTIONAL: Alert, in no apparent distress; well-developed; well-nourished. HEAD:  Normocephalic, atraumatic. EYES: PERRL; EOM's intact. ENTM: Nose: Positive rhinorrhea; Throat: mucous membranes moist. Posterior pharynx-normal. TM's dull, TTP frontal sinus Neck:  No JVD, supple without lymphadenopathy. RESP: Chest clear, equal breath sounds. CV: S1 and S2 WNL; No murmurs, gallops or rubs. UPPER EXT:  Normal inspection. LOWER EXT: Normal inspection. SKIN: No rashes; Normal for age and stage. PSYCH:  Alert and oriented, normal affect. Diagnostic Study Results Labs - No results found for this or any previous visit (from the past 12 hour(s)). Radiologic Studies - No orders to display Medical Decision Making I am the first provider for this patient. I reviewed the vital signs, available nursing notes, past medical history, past surgical history, family history and social history. Vital Signs-Reviewed the patient's vital signs. Pulse Oximetry Analysis -  98 on room air (Interpretation)wnl Records Reviewed: Nursing Notes and Old Medical Records (Time of Review: 2:44 PM) 
 
ED Course: Progress Notes, Reevaluation, and Consults: 
 
 
Provider Notes (Medical Decision Making): DDX: Viral vs Bacterial illness, Influenza, acute rhinitis, pneumonia, Bronchitis, Sinusitis, COPD, Asthma IMPRESSION AND MEDICAL DECISION MAKING: 
Based upon the patient's presentation with noted HPI and PE, along with the work up done in the emergency department, I believe that the patient has a sinusitis. Will treat and have her follow up with her PCP. The patient will be discharged home. Warning signs of worsening condition were discussed and understood by the patient.  Based on patient's age, coexisting illness, exam, and the results of this ED evaluation, the decision to treat as an outpatient was made. Based on the information available at time of discharge, acute pathology requiring immediate intervention was deemed relative unlikely. While it is impossible to completely exclude the possibility of underlying serious disease or worsening of condition, I feel the relative likelihood is extremely low. I discussed this uncertainty with the patient, who understood ED evaluation and treatment and felt comfortable with the outpatient treatment plan. All questions regarding care, test results, and follow up were answered. The patient is stable and appropriate to discharge. They understand that they should return to the emergency department for any new or worsening symptoms. I stressed the importance of follow up for repeat assessment and possibly further evaluation/treatment. PROGRESS NOTE: One or more blood pressure readings were noted elevated during the Pt's presentation in the emergency department this date. This abnormal reading has been cited in the Pt's diagnosis, and they have been encouraged to follow up with their primary care physician, or referred to a consultant for further evaluation and treatment. Pt advised of elevated BP. Advised Pt the need for follow up for the elevated BP. Advised Pt to monitor and record BP readings. ACEP guidelines are  Initiating treatment for asymptomatic hypertension in the ED is not necessary when patients have follow-up; (2) Rapidly lowering blood pressure in asymptomatic patients in the ED is unnecessary and may be harmful in some patients; (3) When ED treatment for asymptomatic hypertension is initiated, blood pressure management should attempt to gradually lower blood pressure and should not be expected to be normalized during the initial ED visit. Diagnosis Clinical Impression: 1. Acute sinusitis, recurrence not specified, unspecified location 2. Elevated blood pressure reading _______________________________

## 2018-10-04 NOTE — DISCHARGE INSTRUCTIONS
Sinusitis: Care Instructions  Your Care Instructions    Sinusitis is an infection of the lining of the sinus cavities in your head. Sinusitis often follows a cold. It causes pain and pressure in your head and face. In most cases, sinusitis gets better on its own in 1 to 2 weeks. But some mild symptoms may last for several weeks. Sometimes antibiotics are needed. Follow-up care is a key part of your treatment and safety. Be sure to make and go to all appointments, and call your doctor if you are having problems. It's also a good idea to know your test results and keep a list of the medicines you take. How can you care for yourself at home? · Take an over-the-counter pain medicine, such as acetaminophen (Tylenol), ibuprofen (Advil, Motrin), or naproxen (Aleve). Read and follow all instructions on the label. · If the doctor prescribed antibiotics, take them as directed. Do not stop taking them just because you feel better. You need to take the full course of antibiotics. · Be careful when taking over-the-counter cold or flu medicines and Tylenol at the same time. Many of these medicines have acetaminophen, which is Tylenol. Read the labels to make sure that you are not taking more than the recommended dose. Too much acetaminophen (Tylenol) can be harmful. · Breathe warm, moist air from a steamy shower, a hot bath, or a sink filled with hot water. Avoid cold, dry air. Using a humidifier in your home may help. Follow the directions for cleaning the machine. · Use saline (saltwater) nasal washes to help keep your nasal passages open and wash out mucus and bacteria. You can buy saline nose drops at a grocery store or drugstore. Or you can make your own at home by adding 1 teaspoon of salt and 1 teaspoon of baking soda to 2 cups of distilled water. If you make your own, fill a bulb syringe with the solution, insert the tip into your nostril, and squeeze gently. [place] your nose.   · Put a hot, wet towel or a warm gel pack on your face 3 or 4 times a day for 5 to 10 minutes each time. · Try a decongestant nasal spray like oxymetazoline (Afrin). Do not use it for more than 3 days in a row. Using it for more than 3 days can make your congestion worse. When should you call for help? Call your doctor now or seek immediate medical care if:    · You have new or worse swelling or redness in your face or around your eyes.     · You have a new or higher fever.    Watch closely for changes in your health, and be sure to contact your doctor if:    · You have new or worse facial pain.     · The mucus from your nose becomes thicker (like pus) or has new blood in it.     · You are not getting better as expected. Where can you learn more? Go to http://charles-bernie.info/. Enter S575 in the search box to learn more about \"Sinusitis: Care Instructions. \"  Current as of: March 28, 2018  Content Version: 11.8  © 9257-8687 [employer]. Care instructions adapted under license by [employer] (which disclaims liability or warranty for this information). If you have questions about a medical condition or this instruction, always ask your healthcare professional. [place] [address] any warranty or liability for your use of this information.

## 2018-10-06 ENCOUNTER — HOSPITAL ENCOUNTER (EMERGENCY)
Age: 58
Discharge: HOME | End: 2018-10-06
Attending: EMERGENCY MEDICINE | Admitting: EMERGENCY MEDICINE
Payer: SUBSIDIZED

## 2018-10-06 ENCOUNTER — APPOINTMENT (OUTPATIENT)
Dept: GENERAL RADIOLOGY | Age: 58
End: 2018-10-06
Attending: NURSE PRACTITIONER
Payer: SUBSIDIZED

## 2018-10-06 VITALS
DIASTOLIC BLOOD PRESSURE: 96 MMHG | HEART RATE: 98 BPM | BODY MASS INDEX: 19.11 KG/M2 | HEIGHT: 69 IN | RESPIRATION RATE: 16 BRPM | SYSTOLIC BLOOD PRESSURE: 133 MMHG | OXYGEN SATURATION: 96 % | TEMPERATURE: 97.4 F | WEIGHT: 129 LBS

## 2018-10-06 DIAGNOSIS — S92.342A CLOSED DISPLACED FRACTURE OF FOURTH METATARSAL BONE OF LEFT FOOT, INITIAL ENCOUNTER: Primary | ICD-10-CM

## 2018-10-06 DIAGNOSIS — S90.852A FOREIGN BODY IN LEFT FOOT, INITIAL ENCOUNTER: ICD-10-CM

## 2018-10-06 DIAGNOSIS — M79.672 LEFT FOOT PAIN: ICD-10-CM

## 2018-10-06 PROCEDURE — 74011250637 HC RX REV CODE- 250/637: Performed by: NURSE PRACTITIONER

## 2018-10-06 PROCEDURE — 73630 X-RAY EXAM OF FOOT: CPT

## 2018-10-06 PROCEDURE — 99284 EMERGENCY DEPT VISIT MOD MDM: CPT

## 2018-10-06 RX ORDER — IBUPROFEN 800 MG/1
800 TABLET ORAL
Qty: 20 TAB | Refills: 0 | Status: SHIPPED | OUTPATIENT
Start: 2018-10-06 | End: 2018-10-13

## 2018-10-06 RX ORDER — HYDROCODONE BITARTRATE AND ACETAMINOPHEN 5; 325 MG/1; MG/1
1 TABLET ORAL
Qty: 20 TAB | Refills: 0 | Status: SHIPPED | OUTPATIENT
Start: 2018-10-06 | End: 2020-03-12

## 2018-10-06 RX ORDER — IBUPROFEN 400 MG/1
800 TABLET ORAL
Status: COMPLETED | OUTPATIENT
Start: 2018-10-06 | End: 2018-10-06

## 2018-10-06 RX ADMIN — IBUPROFEN 800 MG: 400 TABLET ORAL at 19:32
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
## 2018-10-07 NOTE — ED NOTES
I have reviewed discharge instructions with the patient. The patient verbalized understanding. Patient armband removed and given to patient to take home. Patient was informed of the privacy risks if armband lost or stolen. 2 prescription given. Pt ambulated out of the ED.

## 2018-10-07 NOTE — DISCHARGE INSTRUCTIONS
[employer] Activation    Thank you for requesting access to [employer]. Please follow the instructions below to securely access and download your online medical record. [employer] allows you to send messages to your doctor, view your test results, renew your prescriptions, schedule appointments, and more. How Do I Sign Up? 1. In your internet browser, go to [email or web address]  2. Click on the First Time User? Click Here link in the Sign In box. You will be redirect to the New Member Sign Up page. 3. Enter your [employer] Access Code exactly as it appears below. You will not need to use this code after youve completed the sign-up process. If you do not sign up before the expiration date, you must request a new code. [employer] Access Code: [email or web address]  Expires: 11/15/2018  8:30 AM (This is the date your [employer] access code will )    4. Enter the last four digits of your Social Security Number (xxxx) and Date of Birth (mm/dd/yyyy) as indicated and click Submit. You will be taken to the next sign-up page. 5. Create a [employer] ID. This will be your [employer] login ID and cannot be changed, so think of one that is secure and easy to remember. 6. Create a [employer] password. You can change your password at any time. 7. Enter your Password Reset Question and Answer. This can be used at a later time if you forget your password. 8. Enter your e-mail address. You will receive e-mail notification when new information is available in [address]. 9. Click Sign Up. You can now view and download portions of your medical record. 10. Click the Download Summary menu link to download a portable copy of your medical information. Additional Information    If you have questions, please visit the Frequently Asked Questions section of the [employer] website at [email or web address] Remember, [employer] is NOT to be used for urgent needs. For medical emergencies, dial 911.     Follow up with the Orthopedist as referred.

## 2018-10-13 ENCOUNTER — HOSPITAL ENCOUNTER (EMERGENCY)
Age: 58
Discharge: HOME | End: 2018-10-13
Attending: EMERGENCY MEDICINE
Payer: SUBSIDIZED

## 2018-10-13 VITALS
SYSTOLIC BLOOD PRESSURE: 159 MMHG | RESPIRATION RATE: 16 BRPM | TEMPERATURE: 98 F | OXYGEN SATURATION: 100 % | HEART RATE: 92 BPM | WEIGHT: 140 LBS | BODY MASS INDEX: 20.67 KG/M2 | DIASTOLIC BLOOD PRESSURE: 100 MMHG
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
## 2018-10-13 PROCEDURE — 74011250637 HC RX REV CODE- 250/637: Performed by: PHYSICIAN ASSISTANT

## 2018-10-13 PROCEDURE — 99283 EMERGENCY DEPT VISIT LOW MDM: CPT

## 2018-10-13 PROCEDURE — 74011636637 HC RX REV CODE- 636/637: Performed by: PHYSICIAN ASSISTANT

## 2018-10-13 RX ORDER — PREDNISONE 20 MG/1
60 TABLET ORAL DAILY
Qty: 15 TAB | Refills: 0 | Status: SHIPPED | OUTPATIENT
Start: 2018-10-13 | End: 2018-10-18

## 2018-10-13 RX ORDER — DIPHENHYDRAMINE HCL 25 MG
25 CAPSULE ORAL
Status: COMPLETED | OUTPATIENT
Start: 2018-10-13 | End: 2018-10-13

## 2018-10-13 RX ORDER — DIPHENHYDRAMINE HCL 25 MG
CAPSULE ORAL
Qty: 16 CAP | Refills: 0 | Status: SHIPPED | OUTPATIENT
Start: 2018-10-13 | End: 2020-03-12

## 2018-10-13 RX ORDER — PREDNISONE 20 MG/1
60 TABLET ORAL
Status: COMPLETED | OUTPATIENT
Start: 2018-10-13 | End: 2018-10-13

## 2018-10-13 RX ADMIN — PREDNISONE 60 MG: 20 TABLET ORAL at 16:24

## 2018-10-13 RX ADMIN — DIPHENHYDRAMINE HYDROCHLORIDE 25 MG: 25 CAPSULE ORAL at 16:24

## 2018-10-13 NOTE — ED PROVIDER NOTES
EMERGENCY DEPARTMENT HISTORY AND PHYSICAL EXAM 
 
3:53 PM 
 
 
Date: (Not on file) Patient Name: Debra Mccauley History of Presenting Illness Chief Complaint Patient presents with  Insect Bite History Provided By: Patient Chief Complaint: insect bites Duration: appeared overnight Timing: acute Location: diffusely over body Quality: pruritic Severity: not reported Modifying Factors: patient has not taken anything for it Associated Symptoms: No fevers, chills, chest pain, shortness of breath. Additional History (Context): Debra Mccauley is a 60 y.o. female presenting to the ED for the evaluation of acute insect bites that are diffusely over her body that she noticed appeared overnight. Patient notes that they are pruritic and red. She has not taken or applied anything to relieve the itching. No fevers, chills, chest pain, shortness of breath. She does note that her roommate has been scratching her arms and is worried there may be a bed bug infestation. No other complaints or concerns at this time. PCP: Char Poe MD 
 
Current Outpatient Prescriptions Medication Sig Dispense Refill  
 HYDROcodone-acetaminophen (NORCO) 5-325 mg per tablet Take 1 Tab by mouth every six (6) hours as needed for Pain. Max Daily Amount: 4 Tabs. 20 Tab 0  ibuprofen (MOTRIN) 800 mg tablet Take 1 Tab by mouth every eight (8) hours as needed for Pain for up to 7 days. 20 Tab 0  
 mometasone (NASONEX) 50 mcg/actuation nasal spray 2 Sprays by Both Nostrils route daily. 1 Container 0  
 acetaminophen (TYLENOL) 325 mg tablet Take 2 Tabs by mouth every four (4) hours as needed for Pain. 20 Tab 0  
 lidocaine (LIDODERM) 5 % Apply patch to the affected area for 12 hours a day and remove for 12 hours a day.  4 Each 0  
 cyclobenzaprine (FLEXERIL) 10 mg tablet TAKE 1/2 TO 1 TABLET BY MOUTH 3 TIMES DAILY AS NEEDED 30 Tab 1  
  oxybutynin (DITROPAN) 5 mg tablet Take 1 Tab by mouth three (3) times daily. 60 Tab 5  FLUoxetine (PROZAC) 40 mg capsule Take 1 Cap by mouth daily. 30 Cap 5  
 diclofenac EC (VOLTAREN) 75 mg EC tablet Take 1 Tab by mouth two (2) times daily as needed. 60 Tab 5  
 oxyCODONE-acetaminophen (PERCOCET) 5-325 mg per tablet Take 1-2 Tabs by mouth every four (4) hours as needed for Pain. Max Daily Amount: 12 Tabs. 20 Tab 0  
 LORazepam (ATIVAN) 0.5 mg tablet Take  by mouth every eight (8) hours as needed for Anxiety. Past History Past Medical History: 
Past Medical History:  
Diagnosis Date  Alcohol abuse States she has quit  Anxiety  Chronic obstructive pulmonary disease ([facility] [address]  Depression  Essential hypertension  Hypertension  Psychiatric disorder  Tobacco abuse Past Surgical History: 
Past Surgical History:  
Procedure Laterality Date  COLONOSCOPY N/A 2018 COLONOSCOPY performed by Chely Pham MD at [address] [facility] GYN    
  Family History: 
Family History Problem Relation Age of Onset  Diabetes Mother  Hypertension Mother  Heart Attack Mother  Hypertension Father  Diabetes Brother Social History: 
Social History Substance Use Topics  Smoking status: Current Every Day Smoker Packs/day: 0.50  Smokeless tobacco: Never Used  Alcohol use No  
   Comment: States she has quit Allergies: Allergies Allergen Reactions  Codeine Nausea and Vomiting Review of Systems Review of Systems Constitutional: Negative for chills and fever. Respiratory: Negative for shortness of breath. Cardiovascular: Negative for chest pain. Skin: Positive for rash (diffuse). (+) diffuse insect bites All other systems reviewed and are negative. Physical Exam  
There were no vitals taken for this visit.  
 
 
Physical Exam  
 Constitutional: She is oriented to person, place, and time. She appears well-developed and well-nourished. No distress. NAD, well hydrated, non toxic HENT:  
Head: Normocephalic and atraumatic. Nose: Nose normal.  
Mouth/Throat: Oropharynx is clear and moist. No oropharyngeal exudate. Eyes: Conjunctivae and EOM are normal. Pupils are equal, round, and reactive to light. Neck: Normal range of motion. Neck supple. Cardiovascular: Normal rate, regular rhythm and normal heart sounds. No murmur heard. Pulmonary/Chest: Effort normal and breath sounds normal. No respiratory distress. She has no wheezes. She has no rales. Abdominal: Soft. She exhibits no distension. There is no tenderness. There is no guarding. Musculoskeletal: Normal range of motion. Lymphadenopathy:  
  She has no cervical adenopathy. Neurological: She is alert and oriented to person, place, and time. No cranial nerve deficit. Coordination normal.  
Skin: Skin is warm. No rash noted. She is not diaphoretic. Many raised round lesions noted to entire body in linear formation c/w bed bugs. No pustules, no vesicles Psychiatric: She has a normal mood and affect. Her behavior is normal.  
Nursing note and vitals reviewed. rash spares fingers and toes, no oral or palm lesions Diagnostic Study Results Labs - No results found for this or any previous visit (from the past 12 hour(s)). Radiologic Studies - No orders to display Medical Decision Making I am the first provider for this patient. I reviewed the vital signs, available nursing notes, past medical history, past surgical history, family history and social history. Vital Signs-Reviewed the patient's vital signs. Pulse Oximetry Analysis -  100% on RA (Interpretation) wnl Records Reviewed: Nursing Notes (Time of Review: 3:53 PM) ED Course: Progress Notes, Reevaluation, and Consults: Provider Notes (Medical Decision Making): pt treated for bed bugs. Understands home care and f/u instructions. Medications given for reaction and itching. Diagnosis Clinical Impression: No diagnosis found. Disposition: Discharge to home Follow-up Information None Patient's Medications Start Taking No medications on file Continue Taking ACETAMINOPHEN (TYLENOL) 325 MG TABLET    Take 2 Tabs by mouth every four (4) hours as needed for Pain. CYCLOBENZAPRINE (FLEXERIL) 10 MG TABLET    TAKE 1/2 TO 1 TABLET BY MOUTH 3 TIMES DAILY AS NEEDED  
 DICLOFENAC EC (VOLTAREN) 75 MG EC TABLET    Take 1 Tab by mouth two (2) times daily as needed. FLUOXETINE (PROZAC) 40 MG CAPSULE    Take 1 Cap by mouth daily. HYDROCODONE-ACETAMINOPHEN (NORCO) 5-325 MG PER TABLET    Take 1 Tab by mouth every six (6) hours as needed for Pain. Max Daily Amount: 4 Tabs. IBUPROFEN (MOTRIN) 800 MG TABLET    Take 1 Tab by mouth every eight (8) hours as needed for Pain for up to 7 days. LIDOCAINE (LIDODERM) 5 %    Apply patch to the affected area for 12 hours a day and remove for 12 hours a day. LORAZEPAM (ATIVAN) 0.5 MG TABLET    Take  by mouth every eight (8) hours as needed for Anxiety. MOMETASONE (NASONEX) 50 MCG/ACTUATION NASAL SPRAY    2 Sprays by Both Nostrils route daily. OXYBUTYNIN (DITROPAN) 5 MG TABLET    Take 1 Tab by mouth three (3) times daily. OXYCODONE-ACETAMINOPHEN (PERCOCET) 5-325 MG PER TABLET    Take 1-2 Tabs by mouth every four (4) hours as needed for Pain. Max Daily Amount: 12 Tabs. These Medications have changed No medications on file Stop Taking No medications on file  
 
_______________________________ Attestations: 
Scribe Attestation Jami Shi acting as a scribe for and in the presence of Gunnar Thompson October 13, 2018 at 3:53 PM 
    
Provider Attestation:     
I personally performed the services described in the documentation, reviewed the documentation, as recorded by the scribe in my presence, and it accurately and completely records my words and actions. October 13, 2018 at 3:53 PM - Camacho Hill PA-C 
_______________________________

## 2018-10-13 NOTE — DISCHARGE INSTRUCTIONS
Bedbugs: Care Instructions  Your Care Instructions    Bedbugs are tiny bugs that feed on blood from animals or people. They have that name because they like to hide in bedding and mattresses. Bedbugs are not known to spread disease to people. But itching from the bites can be so bad that you may scratch hard enough to break the skin. That can lead to infection. The bites can also cause an allergic reaction in some people. The bugs can spread into cracks and crevices in the room and lay their eggs in anything that is in the room, including clothing and furniture. This makes them very hard to kill. Getting rid of bedbugs  The best way to get rid of bedbugs is to call a professional pest control company. They use special pesticides and other equipment to kill the bugs and their eggs. If you decide to buy your own pesticide, check the label. Make sure it says that it is for bedbugs. Be sure to follow the directions carefully. You may have to use the pesticide more than once. Do other cleaning steps such as:  · Vacuum often. Be sure to empty the vacuum after each use. If you use a vacuum bag, seal it and throw it out in an outdoor trash can. If you don't use a vacuum bag, empty the container and clean it with hot, soapy water. · Launder things that might hide bugs. Washing and then drying items in a dryer on a hot setting is adequate to kill bedbugs in clothing or linens. Turn the dryer to the hottest setting that the fabric can handle. · Use mattress, box spring, and pillow (encasement) sacks to trap bed bugs and help get rid of them. Be sure to follow the directions on the package. After your mattress has been cleared of bedbugs, you can buy a special mattress cover that is made to keep bedbugs out of your mattress. Follow-up care is a key part of your treatment and safety. Be sure to make and go to all appointments, and call your doctor if you are having problems.  It's also a good idea to know your test results and keep a list of the medicines you take. How can you care for yourself at home? · Wash the bites with soap to lower the chance of infection. Try not to scratch. · Use calamine lotion or an anti-itch cream to stop the itching. You can also hold an oatmeal-soaked washcloth on the itchy area for 15 minutes. You can buy an oatmeal powder, such as Aveeno Colloidal Oatmeal, in drugstores. · Use an ice pack to stop the swelling. When should you call for help? Call your doctor now or seek immediate medical care if:    · You have signs of infection, such as:  ¨ Increased pain, swelling, warmth, or redness. ¨ Red streaks leading from a bite area. ¨ Pus draining from a bite area. ¨ A fever.    Watch closely for changes in your health, and be sure to contact your doctor if:    · Anyone else in your family has itching.     · You do not get better as expected. Where can you learn more? Go to http://charles-bernie.info/. Enter G246 in the search box to learn more about \"Bedbugs: Care Instructions. \"  Current as of: November 20, 2017  Content Version: 11.8  © 6211-0132 [employer]. Care instructions adapted under license by [facility] (which disclaims liability or warranty for this information). If you have questions about a medical condition or this instruction, always ask your healthcare professional. [place] [address] any warranty or liability for your use of this information.

## 2020-12-08 ENCOUNTER — APPOINTMENT (OUTPATIENT)
Dept: GENERAL RADIOLOGY | Age: 60
End: 2020-12-08
Attending: PHYSICIAN ASSISTANT
Payer: MEDICAID

## 2020-12-08 ENCOUNTER — APPOINTMENT (OUTPATIENT)
Dept: CT IMAGING | Age: 60
End: 2020-12-08
Attending: PHYSICIAN ASSISTANT
Payer: MEDICAID

## 2020-12-08 ENCOUNTER — HOSPITAL ENCOUNTER (EMERGENCY)
Age: 60
Discharge: HOME | End: 2020-12-08
Attending: EMERGENCY MEDICINE
Payer: MEDICAID

## 2020-12-08 VITALS
TEMPERATURE: 98.3 F | WEIGHT: 150 LBS | RESPIRATION RATE: 18 BRPM | SYSTOLIC BLOOD PRESSURE: 145 MMHG | BODY MASS INDEX: 22.15 KG/M2 | OXYGEN SATURATION: 97 % | DIASTOLIC BLOOD PRESSURE: 83 MMHG | HEART RATE: 90 BPM

## 2020-12-08 DIAGNOSIS — K85.90 ACUTE PANCREATITIS, UNSPECIFIED COMPLICATION STATUS, UNSPECIFIED PANCREATITIS TYPE: Primary | ICD-10-CM

## 2020-12-08 DIAGNOSIS — E87.6 HYPOKALEMIA: ICD-10-CM

## 2020-12-08 LAB
ALBUMIN SERPL-MCNC: 2.5 G/DL (ref 3.4–5)
ALBUMIN/GLOB SERPL: 0.6 {RATIO} (ref 0.8–1.7)
ALP SERPL-CCNC: 135 U/L (ref 45–117)
ALT SERPL-CCNC: 60 U/L (ref 13–56)
ANION GAP SERPL CALC-SCNC: 10 MMOL/L (ref 3–18)
APPEARANCE UR: CLEAR
AST SERPL-CCNC: 60 U/L (ref 10–38)
BASOPHILS # BLD: 0.1 K/UL (ref 0–0.1)
BASOPHILS NFR BLD: 1 % (ref 0–2)
BILIRUB SERPL-MCNC: 0.5 MG/DL (ref 0.2–1)
BILIRUB UR QL: NEGATIVE
BUN SERPL-MCNC: 9 MG/DL (ref 7–18)
BUN/CREAT SERPL: 17 (ref 12–20)
CALCIUM SERPL-MCNC: 8.8 MG/DL (ref 8.5–10.1)
CHLORIDE SERPL-SCNC: 93 MMOL/L (ref 100–111)
CO2 SERPL-SCNC: 27 MMOL/L (ref 21–32)
COLOR UR: YELLOW
CREAT SERPL-MCNC: 0.52 MG/DL (ref 0.6–1.3)
DIFFERENTIAL METHOD BLD: ABNORMAL
EOSINOPHIL # BLD: 0.3 K/UL (ref 0–0.4)
EOSINOPHIL NFR BLD: 3 % (ref 0–5)
ERYTHROCYTE [DISTWIDTH] IN BLOOD BY AUTOMATED COUNT: 13.2 % (ref 11.6–14.5)
GLOBULIN SER CALC-MCNC: 4.4 G/DL (ref 2–4)
GLUCOSE SERPL-MCNC: 94 MG/DL (ref 74–99)
GLUCOSE UR STRIP.AUTO-MCNC: NEGATIVE MG/DL
HCT VFR BLD AUTO: 32.4 % (ref 35–45)
HEMOCCULT STL QL: NEGATIVE
HGB BLD-MCNC: 10.9 G/DL (ref 12–16)
HGB UR QL STRIP: NEGATIVE
KETONES UR QL STRIP.AUTO: 15 MG/DL
LEUKOCYTE ESTERASE UR QL STRIP.AUTO: NEGATIVE
LIPASE SERPL-CCNC: 995 U/L (ref 73–393)
LYMPHOCYTES # BLD: 1.6 K/UL (ref 0.9–3.6)
LYMPHOCYTES NFR BLD: 14 % (ref 21–52)
MCH RBC QN AUTO: 29.5 PG (ref 24–34)
MCHC RBC AUTO-ENTMCNC: 33.6 G/DL (ref 31–37)
MCV RBC AUTO: 87.8 FL (ref 74–97)
MONOCYTES # BLD: 3.2 K/UL (ref 0.05–1.2)
MONOCYTES NFR BLD: 27 % (ref 3–10)
NEUTS SEG # BLD: 6.7 K/UL (ref 1.8–8)
NEUTS SEG NFR BLD: 55 % (ref 40–73)
NITRITE UR QL STRIP.AUTO: NEGATIVE
PH UR STRIP: 7 [PH] (ref 5–8)
PLATELET # BLD AUTO: 221 K/UL (ref 135–420)
PMV BLD AUTO: 10.3 FL (ref 9.2–11.8)
POTASSIUM SERPL-SCNC: 3.1 MMOL/L (ref 3.5–5.5)
PROT SERPL-MCNC: 6.9 G/DL (ref 6.4–8.2)
PROT UR STRIP-MCNC: NEGATIVE MG/DL
RBC # BLD AUTO: 3.69 M/UL (ref 4.2–5.3)
SODIUM SERPL-SCNC: 130 MMOL/L (ref 136–145)
SP GR UR REFRACTOMETRY: <1.005 (ref 1–1.03)
UROBILINOGEN UR QL STRIP.AUTO: 1 EU/DL (ref 0.2–1)
WBC # BLD AUTO: 11.9 K/UL (ref 4.6–13.2)

## 2020-12-08 PROCEDURE — 71045 X-RAY EXAM CHEST 1 VIEW: CPT

## 2020-12-08 PROCEDURE — 74011000636 HC RX REV CODE- 636: Performed by: EMERGENCY MEDICINE

## 2020-12-08 PROCEDURE — 83690 ASSAY OF LIPASE: CPT

## 2020-12-08 PROCEDURE — 82272 OCCULT BLD FECES 1-3 TESTS: CPT

## 2020-12-08 PROCEDURE — 80053 COMPREHEN METABOLIC PANEL: CPT

## 2020-12-08 PROCEDURE — 87635 SARS-COV-2 COVID-19 AMP PRB: CPT

## 2020-12-08 PROCEDURE — 99284 EMERGENCY DEPT VISIT MOD MDM: CPT

## 2020-12-08 PROCEDURE — 96376 TX/PRO/DX INJ SAME DRUG ADON: CPT

## 2020-12-08 PROCEDURE — 74177 CT ABD & PELVIS W/CONTRAST: CPT

## 2020-12-08 PROCEDURE — 74011250636 HC RX REV CODE- 250/636: Performed by: PHYSICIAN ASSISTANT

## 2020-12-08 PROCEDURE — 96374 THER/PROPH/DIAG INJ IV PUSH: CPT

## 2020-12-08 PROCEDURE — 74011250637 HC RX REV CODE- 250/637: Performed by: PHYSICIAN ASSISTANT

## 2020-12-08 PROCEDURE — 74011250636 HC RX REV CODE- 250/636: Performed by: EMERGENCY MEDICINE

## 2020-12-08 PROCEDURE — 85025 COMPLETE CBC W/AUTO DIFF WBC: CPT

## 2020-12-08 PROCEDURE — 81003 URINALYSIS AUTO W/O SCOPE: CPT

## 2020-12-08 PROCEDURE — 96375 TX/PRO/DX INJ NEW DRUG ADDON: CPT

## 2020-12-08 RX ORDER — ONDANSETRON 2 MG/ML
4 INJECTION INTRAMUSCULAR; INTRAVENOUS
Status: COMPLETED | OUTPATIENT
Start: 2020-12-08 | End: 2020-12-08

## 2020-12-08 RX ORDER — METOCLOPRAMIDE HYDROCHLORIDE 5 MG/ML
5 INJECTION INTRAMUSCULAR; INTRAVENOUS
Status: COMPLETED | OUTPATIENT
Start: 2020-12-08 | End: 2020-12-08

## 2020-12-08 RX ORDER — HYDROCODONE BITARTRATE AND ACETAMINOPHEN 5; 325 MG/1; MG/1
1 TABLET ORAL
Qty: 10 TAB | Refills: 0 | Status: SHIPPED | OUTPATIENT
Start: 2020-12-08 | End: 2020-12-11

## 2020-12-08 RX ORDER — NAPROXEN 500 MG/1
500 TABLET ORAL 2 TIMES DAILY WITH MEALS
Qty: 20 TAB | Refills: 0 | Status: SHIPPED | OUTPATIENT
Start: 2020-12-08 | End: 2020-12-18

## 2020-12-08 RX ORDER — POTASSIUM CHLORIDE 20 MEQ/1
20 TABLET, EXTENDED RELEASE ORAL
Status: COMPLETED | OUTPATIENT
Start: 2020-12-08 | End: 2020-12-08

## 2020-12-08 RX ORDER — KETOROLAC TROMETHAMINE 15 MG/ML
15 INJECTION, SOLUTION INTRAMUSCULAR; INTRAVENOUS
Status: COMPLETED | OUTPATIENT
Start: 2020-12-08 | End: 2020-12-08

## 2020-12-08 RX ORDER — FAMOTIDINE 10 MG/ML
20 INJECTION INTRAVENOUS
Status: COMPLETED | OUTPATIENT
Start: 2020-12-08 | End: 2020-12-08

## 2020-12-08 RX ORDER — ONDANSETRON 4 MG/1
4-8 TABLET, ORALLY DISINTEGRATING ORAL
Qty: 15 TAB | Refills: 1 | Status: SHIPPED | OUTPATIENT
Start: 2020-12-08

## 2020-12-08 RX ORDER — FAMOTIDINE 20 MG/1
20 TABLET, FILM COATED ORAL 2 TIMES DAILY
Qty: 20 TAB | Refills: 0 | Status: SHIPPED | OUTPATIENT
Start: 2020-12-08 | End: 2020-12-18

## 2020-12-08 RX ORDER — HYDROMORPHONE HYDROCHLORIDE 1 MG/ML
0.5 INJECTION, SOLUTION INTRAMUSCULAR; INTRAVENOUS; SUBCUTANEOUS
Status: COMPLETED | OUTPATIENT
Start: 2020-12-08 | End: 2020-12-08

## 2020-12-08 RX ADMIN — ONDANSETRON 4 MG: 2 INJECTION INTRAMUSCULAR; INTRAVENOUS at 21:20

## 2020-12-08 RX ADMIN — SODIUM CHLORIDE 1000 ML: 900 INJECTION, SOLUTION INTRAVENOUS at 19:56

## 2020-12-08 RX ADMIN — HYDROMORPHONE HYDROCHLORIDE 0.5 MG: 1 INJECTION, SOLUTION INTRAMUSCULAR; INTRAVENOUS; SUBCUTANEOUS at 21:19

## 2020-12-08 RX ADMIN — FAMOTIDINE 20 MG: 10 INJECTION INTRAVENOUS at 21:19

## 2020-12-08 RX ADMIN — IOPAMIDOL 90 ML: 612 INJECTION, SOLUTION INTRAVENOUS at 19:07

## 2020-12-08 RX ADMIN — ONDANSETRON 4 MG: 2 SOLUTION INTRAMUSCULAR; INTRAVENOUS at 19:55

## 2020-12-08 RX ADMIN — POTASSIUM CHLORIDE 20 MEQ: 20 TABLET, EXTENDED RELEASE ORAL at 19:56

## 2020-12-08 RX ADMIN — METOCLOPRAMIDE 5 MG: 5 INJECTION, SOLUTION INTRAMUSCULAR; INTRAVENOUS at 21:19

## 2020-12-08 RX ADMIN — KETOROLAC TROMETHAMINE 15 MG: 15 INJECTION, SOLUTION INTRAMUSCULAR; INTRAVENOUS at 21:19

## 2020-12-08 NOTE — ED PROVIDER NOTES
EMERGENCY DEPARTMENT HISTORY AND PHYSICAL EXAM    Date: 2020  Patient Name: Katey Preciado    History of Presenting Illness     Chief Complaint   Patient presents with    Cough    Fever         History Provided By: Patient    Chief Complaint: body aches, cough, dysuria  Duration: 5 Days  Timing:  Acute  Modifying Factors: Motrin helps  Associated Symptoms: denies any other associated signs or symptoms      Additional History (Context): Katey Preciado is a 63 y.o. female with hx of HTN, COPD, anxiety, alcohol abuse (quit), arrived via EMS c/o body aches, fever up to 102F, productive cough, upper abd pains, dysuria. She takes Motrin 600 mg that helps with fever. Denies known sick contacts. PCP: None    Current Facility-Administered Medications   Medication Dose Route Frequency Provider Last Rate Last Dose    sodium chloride 0.9 % bolus infusion 1,000 mL  1,000 mL IntraVENous ONCE Delores Gee PA 1,000 mL/hr at 20 1,000 mL at 20     Current Outpatient Medications   Medication Sig Dispense Refill    FLUoxetine (PROZAC) 40 mg capsule Take 1 Cap by mouth daily.  27 Cap 5       Past History     Past Medical History:  Past Medical History:   Diagnosis Date    Alcohol abuse     States she has quit    Anxiety     Chronic obstructive pulmonary disease ([facility] [address]     Depression     Essential hypertension     Hypertension     Psychiatric disorder     Tobacco abuse        Past Surgical History:  Past Surgical History:   Procedure Laterality Date    COLONOSCOPY N/A 2018    COL[facility]COPY performed by [facility] at [facility] ENDOSCOPY    HX GYN             Family History:  Family History   Problem Relation Age of Onset    Diabetes Mother     Hypertension Mother     Heart Attack Mother     Hypertension Father     Diabetes Brother        Social History:  Social History     Tobacco Use    Smoking status: Current Every Day Smoker     Packs/day: 0.50    Smokeless tobacco: Never Used   Substance Use Topics    Alcohol use: Yes     Comment: \"as much as I want\"    Drug use: No       Allergies: Allergies   Allergen Reactions    Codeine Nausea and Vomiting    Naproxen Nausea and Vomiting         Review of Systems   Review of Systems   Constitutional: Positive for fever. Negative for chills. Eyes: Negative. Respiratory: Positive for cough. Negative for chest tightness and shortness of breath. Cardiovascular: Negative for chest pain. Gastrointestinal: Positive for abdominal pain. Negative for blood in stool, diarrhea, nausea and vomiting. Genitourinary: Positive for dysuria. Negative for difficulty urinating, flank pain and urgency. Musculoskeletal: Positive for myalgias (body aches). Negative for gait problem, neck pain and neck stiffness. Skin: Negative. Negative for rash. Neurological: Negative for dizziness, syncope, weakness, light-headedness and headaches. Hematological: Negative for adenopathy. Psychiatric/Behavioral: Negative for agitation. The patient is not nervous/anxious. All other systems reviewed and are negative. All Other Systems Negative  Physical Exam     Vitals:    12/08/20 1631   BP: (!) 145/83   Pulse: 90   Resp: 18   Temp: 98.3 °F (36.8 °C)   SpO2: 97%   Weight: 68 kg (150 lb)     Physical Exam  Vitals signs and nursing note reviewed. Exam conducted with a chaperone present Camille Martinez). Constitutional:       General: She is not in acute distress. Appearance: She is well-developed. She is not toxic-appearing or diaphoretic. HENT:      Head: Normocephalic and atraumatic. Nose: Nose normal.      Mouth/Throat:      Mouth: Mucous membranes are moist.      Pharynx: Uvula midline. Eyes:      General: No scleral icterus. Extraocular Movements: Extraocular movements intact. Conjunctiva/sclera: Conjunctivae normal.   Neck:      Musculoskeletal: Normal range of motion and neck supple.       Comments: No JVD  Cardiovascular:      Rate and Rhythm: Normal rate and regular rhythm. Pulses: Normal pulses. Heart sounds: Normal heart sounds. Pulmonary:      Effort: Pulmonary effort is normal. No respiratory distress. Breath sounds: Rhonchi present. Comments: +wet sounding cough  Abdominal:      General: Bowel sounds are normal. There is no distension. Palpations: Abdomen is soft. Tenderness: There is abdominal tenderness (mild diffuse epigastric). There is no right CVA tenderness, left CVA tenderness, guarding or rebound. Negative signs include Page's sign, Rovsing's sign and McBurney's sign. Hernia: No hernia is present. Genitourinary:     Rectum: Normal. No mass, tenderness, anal fissure, external hemorrhoid or internal hemorrhoid. Normal anal tone. Comments: Hemoccult sample collected and sent to lab  Musculoskeletal: Normal range of motion. Lymphadenopathy:      Cervical: No cervical adenopathy. Skin:     General: Skin is warm and dry. Coloration: Skin is not jaundiced. Neurological:      Mental Status: She is alert and oriented to person, place, and time. Deep Tendon Reflexes: Reflexes are normal and symmetric. Diagnostic Study Results     Labs -     Recent Results (from the past 12 hour(s))   CBC WITH AUTOMATED DIFF    Collection Time: 12/08/20  5:46 PM   Result Value Ref Range    WBC 11.9 4.6 - 13.2 K/uL    RBC 3.69 (L) 4.20 - 5.30 M/uL    HGB 10.9 (L) 12.0 - 16.0 g/dL    HCT 32.4 (L) 35.0 - 45.0 %    MCV 87.8 74.0 - 97.0 FL    MCH 29.5 24.0 - 34.0 PG    MCHC 33.6 31.0 - 37.0 g/dL    RDW 13.2 11.6 - 14.5 %    PLATELET 137 435 - 224 K/uL    MPV 10.3 9.2 - 11.8 FL    NEUTROPHILS 55 40 - 73 %    LYMPHOCYTES 14 (L) 21 - 52 %    MONOCYTES 27 (H) 3 - 10 %    EOSINOPHILS 3 0 - 5 %    BASOPHILS 1 0 - 2 %    ABS. NEUTROPHILS 6.7 1.8 - 8.0 K/UL    ABS. LYMPHOCYTES 1.6 0.9 - 3.6 K/UL    ABS. MONOCYTES 3.2 (H) 0.05 - 1.2 K/UL    ABS.  EOSINOPHILS 0.3 0.0 - 0.4 K/UL    ABS. BASOPHILS 0.1 0.0 - 0.1 K/UL    DF AUTOMATED     METABOLIC PANEL, COMPREHENSIVE    Collection Time: 12/08/20  5:46 PM   Result Value Ref Range    Sodium 130 (L) 136 - 145 mmol/L    Potassium 3.1 (L) 3.5 - 5.5 mmol/L    Chloride 93 (L) 100 - 111 mmol/L    CO2 27 21 - 32 mmol/L    Anion gap 10 3.0 - 18 mmol/L    Glucose 94 74 - 99 mg/dL    BUN 9 7.0 - 18 MG/DL    Creatinine 0.52 (L) 0.6 - 1.3 MG/DL    BUN/Creatinine ratio 17 12 - 20      GFR est AA >60 >60 ml/min/1.73m2    GFR est non-AA >60 >60 ml/min/1.73m2    Calcium 8.8 8.5 - 10.1 MG/DL    Bilirubin, total 0.5 0.2 - 1.0 MG/DL    ALT (SGPT) 60 (H) 13 - 56 U/L    AST (SGOT) 60 (H) 10 - 38 U/L    Alk. phosphatase 135 (H) 45 - 117 U/L    Protein, total 6.9 6.4 - 8.2 g/dL    Albumin 2.5 (L) 3.4 - 5.0 g/dL    Globulin 4.4 (H) 2.0 - 4.0 g/dL    A-G Ratio 0.6 (L) 0.8 - 1.7     LIPASE    Collection Time: 12/08/20  5:46 PM   Result Value Ref Range    Lipase 995 (H) 73 - 393 U/L   URINALYSIS W/ RFLX MICROSCOPIC    Collection Time: 12/08/20  7:45 PM   Result Value Ref Range    Color YELLOW      Appearance CLEAR      Specific gravity <1.005 (L) 1.005 - 1.030    pH (UA) 7.0 5.0 - 8.0      Protein Negative NEG mg/dL    Glucose Negative NEG mg/dL    Ketone 15 (A) NEG mg/dL    Bilirubin Negative NEG      Blood Negative NEG      Urobilinogen 1.0 0.2 - 1.0 EU/dL    Nitrites Negative NEG      Leukocyte Esterase Negative NEG         Radiologic Studies -   CT ABD PELV W CONT   Final Result   IMPRESSION:      There is acute edematous interstitial pancreatitis with loculated minimally   rim-enhancing fluid in the anterior pararenal space. There is no pancreatic   necrosis. Severe hepatic steatosis.       XR CHEST PORT    (Results Pending)     CT Results  (Last 48 hours)               12/08/20 1914  CT ABD PELV W CONT Final result    Impression:  IMPRESSION:       There is acute edematous interstitial pancreatitis with loculated minimally   rim-enhancing fluid in the anterior pararenal space. There is no pancreatic   necrosis. Severe hepatic steatosis. Narrative:  EXAM: CT of the abdomen and pelvis       INDICATION: Pancreatitis       COMPARISON: 2/17/2015       TECHNIQUE: Axial CT imaging of the abdomen and pelvis was performed with   intravenous contrast. Multiplanar reformats were generated. One or more dose   reduction techniques were used on this CT: automated exposure control,   adjustment of the mAs and/or kVp according to patient size, and iterative   reconstruction techniques. The specific techniques used on this CT exam have   been documented in the patient's electronic medical record. Digital Imaging and   Communications in Medicine (DICOM) format image data are available to   nonaffiliated external healthcare facilities or entities on a secure, media   free, reciprocally searchable basis with patient authorization for at least a   12-month period after this study. _______________       FINDINGS:       LOWER CHEST: There is scarring at the left lung base and right middle lobe. LIVER, BILIARY: There is severe hepatic steatosis. No biliary dilation. Gallbladder is unremarkable. PANCREAS: There is peripancreatic stranding and fluid in the anterior pararenal   space. There is normal pancreas enhancement. There is no duct dilatation. There   is an elongated teardrop shaped fluid collection superior to the head and body   of the pancreas measuring 6.9 x 3.6 x 6.9 cm. There is slight rim enhancement. No gas seen in the collection. SPLEEN: Normal.       ADRENALS: Normal.       KIDNEYS: Normal.       VASCULATURE: Moderate calcific atherosclerotic present. LYMPH NODES: No enlarged lymph nodes. GASTROINTESTINAL TRACT: No bowel dilation or wall thickening. There is colonic   diverticulosis without diverticulitis. Appendix is normal. No bowel obstruction   seen. PELVIC ORGANS: Unremarkable.        BONES: No acute or aggressive osseous abnormalities identified. Degenerative   disc disease present at L5-S1 with vacuum phenomenon. OTHER: None.       _______________               CXR Results  (Last 48 hours)    None            Medical Decision Making   I am the first provider for this patient. I reviewed the vital signs, available nursing notes, past medical history, past surgical history, family history and social history. Vital Signs-Reviewed the patient's vital signs. Pulse Oximetry Analysis - 97% on RA      Records Reviewed: Nursing Notes and Old Medical Records    Procedures:  Procedures    Provider Notes (Medical Decision Making):     6:56 PM   Non-toxic pt presents with multiple complaints: URI symptoms, epigastric abd pain, fevers. She has diffuse epigastric pain more pronounced in LUQ, has no peritoneal signs, afebrile in ED. Labs show elevated lipase at 995, low potassium at 3.1, hyponatremia 130, CBC with decreased Hgb at 10.9, lower than prior,   Will check for covid, CT abd-pelv, check hemoccult, hydrate, treat nausea, tx hypokalemia  Pending: UA  Will continue to monitor. TURN OVER NOTE:   8:04 PM   Consulted with Dahiana MAYA concerning patient Kareem Maurice, standard discussion of reason for visit, HPI, ROS, PE, and current results available. Report was given at this time. Provider above will assume care at his time  [facility]:  Current Facility-Administered Medications   Medication Dose Route Frequency    sodium chloride 0.9 % bolus infusion 1,000 mL  1,000 mL IntraVENous ONCE     Current Outpatient Medications   Medication Sig    FLUoxetine (PROZAC) 40 mg capsule Take 1 Cap by mouth daily.        Disposition: to be determined    Follow-up Information     Follow up With Specialties Details Why Contact Andrea Carrillo  In 5 days for recheck of current symptoms, for blood pressure recheck and control [facility] [place] [id number] [address] [facility]  [phone]    [facility] EMERGENCY DEPT Emergency Medicine  As needed, If symptoms worsen [address]  [phone]          Current Discharge Medication List              Diagnosis     Clinical Impression:   1. Acute pancreatitis, unspecified complication status, unspecified pancreatitis type    2. Hypokalemia          Dictation disclaimer:  Please note that this dictation was completed with [employer], the computer voice recognition software. Quite often unanticipated grammatical, syntax, homophones, and other interpretive errors are inadvertently transcribed by the computer software. Please disregard these errors. Please excuse any errors that have escaped final proofreading.

## 2020-12-09 ENCOUNTER — PATIENT OUTREACH (OUTPATIENT)
Dept: CASE MANAGEMENT | Age: 60
End: 2020-12-09

## 2020-12-09 NOTE — DISCHARGE INSTRUCTIONS
Patient Education   You must not use alcohol in the future as this could cause your pancreatitis to recur     Learning About Acute Pancreatitis  What is acute pancreatitis? The pancreas is an organ behind the stomach. It makes hormones like insulin that help control how your body uses sugar. It also makes enzymes that help you digest food. Usually these enzymes flow from the pancreas to the intestines. But if they leak into the pancreas, they can irritate it and cause pain and swelling. When this happens suddenly, it's called acute pancreatitis. What causes it? Most of the time, acute pancreatitis is caused by gallstones or by heavy alcohol use. But several other things can cause it, such as:  · High levels of fats in the blood. · An injury. · A problem after a surgery or a procedure. · Certain medicines. What are the symptoms? The main symptom is pain in the upper belly. The pain can be severe. You also may have a fever, nausea, or vomiting. Some people get so sick that they have problems breathing. They also may have low blood pressure. How is it diagnosed? Your doctor will diagnose pancreatitis with an exam and by looking at your lab tests. Your doctor may think that you have this problem based on your symptoms and where you have pain in your belly. You may have blood tests of enzymes called amylase and lipase. In pancreatitis, the level of these enzymes is usually much higher than normal.  You also may have imaging tests of the belly. These may include an ultrasound, a CT scan, or an MRI. A special MRI called MRCP can show images of the bile ducts. This test can be very helpful when gallstones are causing the problem. How is it treated? Treatment of acute pancreatitis usually takes place in the hospital. It focuses on taking care of pain and supporting your body while your pancreas heals.  In severe cases, treatment may occur in an intensive care unit to support breathing, treat serious infections, or help raise very low blood pressure. If a gallstone is causing the problem, the gallstone may need to be removed. This is done during a procedure called ERCP. The doctor puts a scope in your mouth and moves it gently through the stomach and into the ducts from the pancreas and gallbladder. The doctor is then able to see a stone and remove it. Sometimes the gallbladder, which makes gallstones, needs to be removed by surgery. People with pancreatitis often need a lot of fluid to help support their other organs and their blood pressure. They get fluids through a vein (intravenous, or IV). Instead of food by mouth, nutrition is sometimes given through a vein while the pancreas heals. Follow-up care is a key part of your treatment and safety. Be sure to make and go to all appointments, and call your doctor if you are having problems. It's also a good idea to know your test results and keep a list of the medicines you take. Where can you learn more? Go to http://www.gray.com/  Enter A940 in the search box to learn more about \"Learning About Acute Pancreatitis. \"  Current as of: April 15, 2020               Content Version: 12.6  © 6072-1617 [employer]. Care instructions adapted under license by [employer] (which disclaims liability or warranty for this information). If you have questions about a medical condition or this instruction, always ask your healthcare professional. [address] any warranty or liability for your use of this information. We are concerned that you may have the COVID-19 virus. You must self-quarantine at home and not leave for any reason until you are symptom free for three consecutive days, or if a viral test was sent, until that comes back negative. Come back to the hospital if you become short of breath at rest, without exerting yourself.  This is a sign that you may need to be admitted to the hospital to be put on oxygen. You have elevated blood pressure readings here. You MUST be further evaluated by a your primary care provider within 2 - 4 days as you may need to develop or change the way to keep your blood pressure under control.

## 2020-12-09 NOTE — ED NOTES
Report to [facility] RN, pt in CT at time of report. No longer primary nurse.

## 2020-12-10 LAB — SARS-COV-2, COV2NT: NOT DETECTED

## 2020-12-11 ENCOUNTER — PATIENT OUTREACH (OUTPATIENT)
Dept: CASE MANAGEMENT | Age: 60
End: 2020-12-11

## 2020-12-13 ENCOUNTER — HOSPITAL ENCOUNTER (EMERGENCY)
Age: 60
Discharge: HOME | End: 2020-12-13
Attending: EMERGENCY MEDICINE
Payer: MEDICAID

## 2020-12-13 VITALS
SYSTOLIC BLOOD PRESSURE: 133 MMHG | TEMPERATURE: 97.9 F | BODY MASS INDEX: 22.15 KG/M2 | DIASTOLIC BLOOD PRESSURE: 92 MMHG | OXYGEN SATURATION: 97 % | WEIGHT: 150 LBS | RESPIRATION RATE: 16 BRPM | HEART RATE: 84 BPM

## 2020-12-13 DIAGNOSIS — F17.210 CIGARETTE SMOKER: ICD-10-CM

## 2020-12-13 DIAGNOSIS — R52 GENERALIZED BODY ACHES: ICD-10-CM

## 2020-12-13 DIAGNOSIS — J06.9 ACUTE URI: Primary | ICD-10-CM

## 2020-12-13 PROCEDURE — 99283 EMERGENCY DEPT VISIT LOW MDM: CPT

## 2020-12-13 PROCEDURE — 74011250637 HC RX REV CODE- 250/637: Performed by: EMERGENCY MEDICINE

## 2020-12-13 RX ORDER — AZITHROMYCIN 250 MG/1
TABLET, FILM COATED ORAL
Qty: 6 TAB | Refills: 0 | OUTPATIENT
Start: 2020-12-13 | End: 2020-12-13

## 2020-12-13 RX ORDER — BENZONATATE 100 MG/1
100 CAPSULE ORAL
Qty: 30 CAP | Refills: 0 | Status: SHIPPED | OUTPATIENT
Start: 2020-12-13 | End: 2020-12-23

## 2020-12-13 RX ORDER — AZITHROMYCIN 250 MG/1
TABLET, FILM COATED ORAL
Qty: 4 TAB | Refills: 0 | Status: ON HOLD | OUTPATIENT
Start: 2020-12-13 | End: 2021-03-23

## 2020-12-13 RX ORDER — AZITHROMYCIN 250 MG/1
500 TABLET, FILM COATED ORAL
Status: COMPLETED | OUTPATIENT
Start: 2020-12-13 | End: 2020-12-13

## 2020-12-13 RX ORDER — AZITHROMYCIN 250 MG/1
TABLET, FILM COATED ORAL
Qty: 4 TAB | Refills: 0 | Status: SHIPPED | OUTPATIENT
Start: 2020-12-13 | End: 2020-12-13

## 2020-12-13 RX ORDER — BENZONATATE 100 MG/1
100 CAPSULE ORAL
Qty: 30 CAP | Refills: 0 | Status: SHIPPED | OUTPATIENT
Start: 2020-12-13 | End: 2020-12-13

## 2020-12-13 RX ADMIN — AZITHROMYCIN MONOHYDRATE 500 MG: 250 TABLET ORAL at 13:11

## 2020-12-13 NOTE — ED PROVIDER NOTES
[facility] EMERGENCY DEPT      12:54 PM    Date: 12/13/2020  Patient Name: Adrian Vera    History of Presenting Illness     Chief Complaint   Patient presents with    Cough       63 y.o. female with noted past medical history who presents to the emergency department with 10 days of body aches and cough. The patient states that for the last 2 days she has had a initially nonproductive cough but now is a white sputum over the last 5 days to present. She is also had myalgias of the thorax but not the extremities in the last 10 days. She states the last 2 days she has had some coughing that includes some blood-tinged sputum initially one time that was dark red but then since it has been dried blood. The patient was seen on 12/8/2020 for acute pancreatitis. She has no other complaints at this time. Patient had work-up on 12/8/2020 to include lab work with normal white count. Noted to have lipase of 995 was discharged home with acute pain pancreatitis. Her urine analysis was negative that time. She also the negative Covid test at that time. Patient denies any other associated signs or symptoms. Patient denies any other complaints. Nursing notes regarding the HPI and triage nursing notes were reviewed. Prior medical records were reviewed. Current Outpatient Medications   Medication Sig Dispense Refill    famotidine (Pepcid) 20 mg tablet Take 1 Tab by mouth two (2) times a day for 10 days. 20 Tab 0    ondansetron (Zofran ODT) 4 mg disintegrating tablet Take 1-2 Tabs by mouth every eight (8) hours as needed for Nausea or Vomiting. 15 Tab 1    naproxen (Naprosyn) 500 mg tablet Take 1 Tab by mouth two (2) times daily (with meals) for 10 days. 20 Tab 0    FLUoxetine (PROZAC) 40 mg capsule Take 1 Cap by mouth daily.  30 Cap 5       Past History     Past Medical History:  Past Medical History:   Diagnosis Date    Alcohol abuse     States she has quit    Anxiety     Chronic obstructive pulmonary disease (HCC)     Depression     Essential hypertension     Hypertension     Psychiatric disorder     Tobacco abuse        Past Surgical History:  Past Surgical History:   Procedure Laterality Date    COLONOSCOPY N/A 2018    COLONOSCOPY performed by Juliano Salinas [facility] at [facility] ENDOSCOPY    HX GYN             Family History:  Family History   Problem Relation Age of Onset    Diabetes Mother     Hypertension Mother     Heart Attack Mother     Hypertension Father     Diabetes Brother        Social History:  Social History     Tobacco Use    Smoking status: Current Every Day Smoker     Packs/day: 0.50    Smokeless tobacco: Never Used   Substance Use Topics    Alcohol use: Yes     Comment: \"as much as I want\"    Drug use: No       Allergies: Allergies   Allergen Reactions    Codeine Nausea and Vomiting    Naproxen Nausea and Vomiting       Patient's primary care provider (as noted in EPIC):  Concha Salcido MD    Review of Systems   Constitutional: Negative for diaphoresis. HENT: Negative for congestion. Eyes: Negative for discharge. Respiratory: Positive for cough. Negative for shortness of breath, wheezing and stridor. Cardiovascular: Negative for palpitations. Gastrointestinal: Negative for diarrhea. Endocrine: Negative for heat intolerance. Genitourinary: Negative for flank pain. Musculoskeletal: Positive for myalgias. Negative for back pain. Neurological: Negative for weakness. Psychiatric/Behavioral: Negative for hallucinations. All other systems reviewed and are negative. Visit Vitals  BP (!) 133/92   Pulse 84   Temp 97.9 °F (36.6 °C)   Resp 16   Wt 68 kg (150 lb)   SpO2 97%   BMI 22.15 kg/m²       PHYSICAL EXAM:    CONSTITUTIONAL:  Alert, in no apparent distress;  well developed;  well nourished. HEAD:  Normocephalic, atraumatic. EYES:  EOMI. Non-icteric sclera. Normal conjunctiva. ENTM:  Nose:  no rhinorrhea.   Throat:  no erythema or exudate, mucous membranes moist.  NECK:  No JVD. Supple    RESPIRATORY:  Chest clear, equal breath sounds, good air movement. CARDIOVASCULAR:  Regular rate and rhythm. No murmurs, rubs, or gallops. GI:  Normal bowel sounds, abdomen soft and non-tender. No rebound or guarding. BACK:  Non-tender. UPPER EXT:  Normal inspection. LOWER EXT:  No edema, no calf tenderness. Distal pulses intact. NEURO:  Moves all four extremities, and grossly normal motor exam.  SKIN:  No rashes;  Normal for age. PSYCH:  Alert and normal affect. DIFFERENTIAL DIAGNOSES/ MEDICAL DECISION MAKING:  Cough etiologies include viral upper respiratory infection, acute bronchitis, influenza/ flu, pneumonia, new onset asthma, congestive heart failure, other etiologies versus a combination of the above (ex. uri on top of pneumonia). Diagnostic Study Results     Abnormal lab results from this emergency department encounter:  Labs Reviewed - No data to display    Lab values for this patient within approximately the last 12 hours:  No results found for this or any previous visit (from the past 12 hour(s)). Radiologist and cardiologist interpretations if available at time of this note:  No results found. Medication(s) ordered for patient during this emergency visit encounter:  Medications - No data to display    Medical Decision Making     I am the first provider for this patient. I reviewed the vital signs, available nursing notes, past medical history, past surgical history, family history and social history. Vital Signs:  Reviewed the patient's vital signs. IMPRESSION AND MEDICAL DECISION MAKING:  Based upon the patient's presentation with noted HPI and PE, along with the work up done in the emergency department, I believe that the patient is having a prolonged URI outside the normal time range of viral URI, yet no signs of bronchitis nor pneumonia.   Given prolonged time course, will cover with antibiotics. DIAGNOSIS:  1. Acute upper respiratory infection. SPECIFIC PATIENT INSTRUCTIONS FROM THE PHYSICIAN WHO TREATED YOU IN THE ER TODAY:  1. Return if any concerns or worsening of condition(s)  2. Zithromax as prescribed until finished. 3. Tessalon perles as prescribed for cough. 4. Over the counter Tylenol for aches and pains and if fever develops. 5. FOLLOW UP APPOINTMENT:  Your primary doctor if still having symptoms after finishing the antibiotic. 6. Smoking is an addictive habit. It may be difficult to quit smoking on your own. Seek the help of your primary doctor for assistance and guidance in quitting smoking. 20 facts about smoking you need to know as a smoker (and hopefully one that will quit smoking):    1. More than 480,000 Americans die each year of smoking. 2. On average, smoking will cut 13 years from your life expectancy. 3. Lung cancer is not the only malignancy you can get from smoking. Others include cancer of the bladder, blood, bone marrow, cervix, colon, esophagus, kidneys, larynx, liver, mouth, pancreas, rectum, stomach, and throat. 4. In addition to cancer, smoking can increase your risk of coronary heart disease and stroke by anywhere from 200 percent to 400 percent. 5. Smoking is a problem that hits poorer people hardest. In fact, 80 percent of the world's smokers live in low- to medium-income countries. Even in the [place] 24.3 percent of people living below the poverty line are smokers compared to 14.3 percent of those living above the poverty line. 6. According to a study published in the Archives of Toxicology, there is enough nicotine in five cigarettes to kill an average adult if ingested whole.  With that being said, most smokers take in an average of one to two milligrams per cigarette of which 0.03 milligrams is absorbed into the bloodstream.  7. There are more than 4,000 chemicals in tobacco smoke, of which more than 250 are known to be harmful, more than 50 are known to cause cancer, and 11 are classified as Group I carcinogens. 8. Benzene is a major cause of acute myeloid leukemia. Not surprisingly, cigarette smoke is the major source of benzene. Among smokers in the [place], 90 percent of their benzene exposure will come from cigarettes. 9. Radioactive lead, polonium, and hydrogen cyanide can all be found in cigarette smoke. History buffs will recognize hydrogen cyanide as a compound used back in World War II as a genocidal agent. 10. Of the six million smoking-related death reported around the world each year, 890,000 (or roughly 15 percent) are the result of secondhand smoke. Despite what some may tell you, there is no safe level of exposure to secondhand smoke. 11. Tobacco kills more than six million people each year, translating to one smoking-related death every five seconds. That is a million more deaths than occurs each year as a result of HIV, tuberculosis, and malaria combined. 12. According to the Centers for Disease Control and Prevention, there were 37.8 million smokers in the [place] in 2016. Over 16 million Americans are currently living with a tobacco-related disease, including chronic obstructive pulmonary disorder (COPD). 13. Worldwide, around 10 million cigarettes are purchased per minute, 15 billion are sold per day, and upwards of five trillion are produced and used every year. 14. A typical cigarette can contain anywhere from eight to nine milligrams of nicotine. By contrast, the nicotine content in a cigar can run anywhere from 100 milligrams to 400 milligrams. 15. Tobacco costs the [place] economy more than $300 billion dollars each year. Of this, $170 billion goes toward medical care, while more than $156 billion is attributed to lost productivity due to illness and death. 12. While fewer young adults are smoking cigarettes in the [place] today, over 3,200 teens and adolescents try their first cigarette every day.  It's estimated that 2,100 of these will go on to become daily smokers. 16. Statistics suggest that 5.6 million children living today in the [place] will die of a smoking-related disease. That is equal to one of every 13 children. 18. The WHO has concluded that half of all smokers will die as a result of tobacco use. Patient is improved, resting quietly and comfortably. The patient will be discharged home. The patient was reassured that these symptoms do not appear to represent a serious or life threatening condition at this time. Warning signs of worsening condition were discussed and understood by the patient. Based on patient's age, coexisting illness, exam, and the results of this ED evaluation, the decision to treat as an outpatient was made. Based on the information available at time of discharge, acute pathology requiring immediate intervention was deemed relative unlikely. While it is impossible to completely exclude the possibility of underlying serious disease or worsening of condition, I feel the relative likelihood is extremely low. I discussed this uncertainty with the patient, who understood ED evaluation and treatment and felt comfortable with the outpatient treatment plan. All questions regarding care, test results, and follow up were answered. The patient is stable and appropriate to discharge. They understand that they should return to the emergency department for any new or worsening symptoms. I stressed the importance of follow up for repeat assessment and possibly further evaluation/treatment. Dictation disclaimer:  Please note that this dictation was completed with [email or web address], the [employer] voice recognition software. Quite often unanticipated grammatical, syntax, homophones, and other interpretive errors are inadvertently transcribed by the computer software. Please disregard these errors. Please excuse any errors that have escaped final proofreading.      Coding Diagnoses Clinical Impression:   1. Acute URI    2. Cigarette smoker    3. Generalized body aches        Disposition     Disposition: Discharge. NIKIA Stafford Board Certified Emergency Physician    Provider Attestation:  If a scribe was utilized in generation of this patient record, I personally performed the services described in the documentation, reviewed the documentation, as recorded by the scribe in my presence, and it accurately records the patient's history of presenting illness, review of systems, patient physical examination, and procedures performed by me as the attending physician. NIKIA Stafford Board Certified Emergency Physician  12/13/2020.  12:57 PM

## 2020-12-13 NOTE — DISCHARGE INSTRUCTIONS
Patient Education        Upper Respiratory Infection (Cold): Care Instructions  Your Care Instructions     An upper respiratory infection, or URI, is an infection of the nose, sinuses, or throat. URIs are spread by coughs, sneezes, and direct contact. The common cold is the most frequent kind of URI. The flu and sinus infections are other kinds of URIs. Almost all URIs are caused by viruses. Antibiotics won't cure them. But you can treat most infections with home care. This may include drinking lots of fluids and taking over-the-counter pain medicine. You will probably feel better in 4 to 10 days. The doctor has checked you carefully, but problems can develop later. If you notice any problems or new symptoms, get medical treatment right away. Follow-up care is a key part of your treatment and safety. Be sure to make and go to all appointments, and call your doctor if you are having problems. It's also a good idea to know your test results and keep a list of the medicines you take. How can you care for yourself at home? · To prevent dehydration, drink plenty of fluids, enough so that your urine is light yellow or clear like water. Choose water and other caffeine-free clear liquids until you feel better. If you have kidney, heart, or liver disease and have to limit fluids, talk with your doctor before you increase the amount of fluids you drink. · Take an over-the-counter pain medicine, such as acetaminophen (Tylenol), ibuprofen (Advil, Motrin), or naproxen (Aleve). Read and follow all instructions on the label. · Before you use cough and cold medicines, check the label. These medicines may not be safe for young children or for people with certain health problems. · Be careful when taking over-the-counter cold or flu medicines and Tylenol at the same time. Many of these medicines have acetaminophen, which is Tylenol. Read the labels to make sure that you are not taking more than the recommended dose.  Too much acetaminophen (Tylenol) can be harmful. · Get plenty of rest.  · Do not smoke or allow others to smoke around you. If you need help quitting, talk to your doctor about stop-smoking programs and medicines. These can increase your chances of quitting for good. When should you call for help? Call 911 anytime you think you may need emergency care. For example, call if:    · You have severe trouble breathing. Call your doctor now or seek immediate medical care if:    · You seem to be getting much sicker.     · You have new or worse trouble breathing.     · You have a new or higher fever.     · You have a new rash. Watch closely for changes in your health, and be sure to contact your doctor if:    · You have a new symptom, such as a sore throat, an earache, or sinus pain.     · You cough more deeply or more often, especially if you notice more mucus or a change in the color of your mucus.     · You do not get better as expected. Where can you learn more? Go to http://www.gray.com/  Enter K520 in the search box to learn more about \"Upper Respiratory Infection (Cold): Care Instructions. \"  Current as of: February 24, 2020               Content Version: 12.6  © 9838-3760 [employer]. Care instructions adapted under license by [employer] (which disclaims liability or warranty for this information). If you have questions about a medical condition or this instruction, always ask your healthcare professional. [place] [address] any warranty or liability for your use of this information. Patient Education        Stopping Smoking: Care Instructions  Your Care Instructions     Cigarette smokers crave the nicotine in cigarettes. Giving it up is much harder than simply changing a habit. Your body has to stop craving the nicotine. It is hard to quit, but you can do it. There are many tools that people use to quit smoking.  You may find that combining tools works best for you. There are several steps to quitting. First you get ready to quit. Then you get support to help you. After that, you learn new skills and behaviors to become a nonsmoker. For many people, a necessary step is getting and using medicine. Your doctor will help you set up the plan that best meets your needs. You may want to attend a smoking cessation program to help you quit smoking. When you choose a program, look for one that has proven success. Ask your doctor for ideas. You will greatly increase your chances of success if you take medicine as well as get counseling or join a cessation program.  Some of the changes you feel when you first quit tobacco are uncomfortable. Your body will miss the nicotine at first, and you may feel short-tempered and grumpy. You may have trouble sleeping or concentrating. Medicine can help you deal with these symptoms. You may struggle with changing your smoking habits and rituals. The last step is the tricky one: Be prepared for the smoking urge to continue for a time. This is a lot to deal with, but keep at it. You will feel better. Follow-up care is a key part of your treatment and safety. Be sure to make and go to all appointments, and call your doctor if you are having problems. It's also a good idea to know your test results and keep a list of the medicines you take. How can you care for yourself at home? · Ask your family, friends, and coworkers for support. You have a better chance of quitting if you have help and support. · Join a support group, such as Nicotine Anonymous, for people who are trying to quit smoking. · Consider signing up for a smoking cessation program, such as the American Lung Association's Freedom from Smoking program.  · Get text messaging support. Go to the website at www.smokefree. gov to sign up for the [facility] program.  · Set a quit date.  Pick your date carefully so that it is not right in the middle of a big deadline or stressful time. Once you quit, do not even take a puff. Get rid of all ashtrays and lighters after your last cigarette. Clean your house and your clothes so that they do not smell of smoke. · Learn how to be a nonsmoker. Think about ways you can avoid those things that make you reach for a cigarette. ? Avoid situations that put you at greatest risk for smoking. For some people, it is hard to have a drink with friends without smoking. For others, they might skip a coffee break with coworkers who smoke. ? Change your daily routine. Take a different route to work or eat a meal in a different place. · Cut down on stress. Calm yourself or release tension by doing an activity you enjoy, such as reading a book, taking a hot bath, or gardening. · Talk to your doctor or pharmacist about nicotine replacement therapy, which replaces the nicotine in your body. You still get nicotine but you do not use tobacco. Nicotine replacement products help you slowly reduce the amount of nicotine you need. These products come in several forms, many of them available over-the-counter:  ? Nicotine patches  ? Nicotine gum and lozenges  ? Nicotine inhaler  · Ask your doctor about bupropion (Wellbutrin) or varenicline (Chantix), which are prescription medicines. They do not contain nicotine. They help you by reducing withdrawal symptoms, such as stress and anxiety. · Some people find hypnosis, acupuncture, and massage helpful for ending the smoking habit. · Eat a healthy diet and get regular exercise. Having healthy habits will help your body move past its craving for nicotine. · Be prepared to keep trying. Most people are not successful the first few times they try to quit. Do not get mad at yourself if you smoke again. Make a list of things you learned and think about when you want to try again, such as next week, next month, or next year. Where can you learn more?   Go to http://www.gray.com/  Enter A448 in the search box to learn more about \"Stopping Smoking: Care Instructions. \"  Current as of: March 12, 2020               Content Version: 12.6  © 3380-5156 [email or web address]. Care instructions adapted under license by [employer] (which disclaims liability or warranty for this information). If you have questions about a medical condition or this instruction, always ask your healthcare professional. [place] [address] any warranty or liability for your use of this information. Patient Education        Learning About Benefits From Quitting Smoking  How does quitting smoking make you healthier? If you're thinking about quitting smoking, you may have a few reasons to be smoke-free. Your health may be one of them. · When you quit smoking, you lower your risks for cancer, lung disease, heart attack, stroke, blood vessel disease, and blindness from macular degeneration. · When you're smoke-free, you get sick less often, and you heal faster. You are less likely to get colds, flu, bronchitis, and pneumonia. · As a nonsmoker, you may find that your mood is better and you are less stressed. When and how will you feel healthier? Quitting has real health benefits that start from day 1 of being smoke-free. And the longer you stay smoke-free, the healthier you get and the better you feel. The first hours  · After just 20 minutes, your blood pressure and heart rate go down. That means there's less stress on your heart and blood vessels. · Within 12 hours, the level of carbon monoxide in your blood drops back to normal. That makes room for more oxygen. With more oxygen in your body, you may notice that you have more energy than when you smoked. After 2 weeks  · Your lungs start to work better. · Your risk of heart attack starts to drop. After 1 month  · When your lungs are clear, you cough less and breathe deeper, so it's easier to be active.   · Your sense of taste and smell return. That means you can enjoy food more than you have since you started smoking. Over the years  · Over the years, your risks of heart disease, heart attack, and stroke are lower. · After 10 years, your risk of dying from lung cancer is cut by about half. And your risk for many other types of cancer is lower too. How would quitting help others in your life? When you quit smoking, you improve the health of everyone who now breathes in your smoke. · Their heart, lung, and cancer risks drop, much like yours. · They are sick less. For babies and small children, living smoke-free means they're less likely to have ear infections, pneumonia, and bronchitis. · If you're a woman who is or will be pregnant someday, quitting smoking means a healthier . · Children who are close to you are less likely to become adult smokers. Where can you learn more? Go to http://www.gray.com/  Enter O319 in the search box to learn more about \"Learning About Benefits From Quitting Smoking. \"  Current as of: 2020               Content Version: 12.6  © 0470-2018 Healthwise, Incorporated. Care instructions adapted under license by [employer] (which disclaims liability or warranty for this information). If you have questions about a medical condition or this instruction, always ask your healthcare professional. [address] any warranty or liability for your use of this information. Mojix Activation    Thank you for requesting access to Mojix. Please follow the instructions below to securely access and download your online medical record. Mojix allows you to send messages to your doctor, view your test results, renew your prescriptions, schedule appointments, and more. How Do I Sign Up? In your internet browser, go to [email or web address] Click on the First Time User? Click Here link in the Sign In box.  You will see the New Member Sign Up page. Enter your Promimic Access Code exactly as it appears below. You will not need to use this code after you´ve completed the sign-up process. If you do not sign up before the expiration date, you must request a new code. Promimic Access Code: [email or web address]  Expires: 3/28/2019  2:27 PM (This is the date your Promimic access code will )    Enter the last four digits of your Social Security Number (xxxx) and Date of Birth (mm/dd/yyyy) as indicated and click Submit. You will be taken to the next sign-up page. Create a Promimic ID. This will be your Promimic login ID and cannot be changed, so think of one that is secure and easy to remember. Create a Promimic password. You can change your password at any time. Enter your Password Reset Question and Answer. This can be used at a later time if you forget your password. Enter your e-mail address. You will receive e-mail notification when new information is available in [address] Click Sign Up. You can now view and download portions of your medical record. Click the [place] link to download a portable copy of your medical information. Additional Information    If you have questions, please visit the Frequently Asked Questions section of the Promimic website at [email or web address] Remember, Promimic is NOT to be used for urgent needs. For medical emergencies, dial 911.

## 2021-03-22 ENCOUNTER — HOSPITAL ENCOUNTER (EMERGENCY)
Age: 61
Discharge: ANOTHER HOSPITAL | End: 2021-03-23
Attending: EMERGENCY MEDICINE
Payer: MEDICAID

## 2021-03-22 ENCOUNTER — APPOINTMENT (OUTPATIENT)
Dept: CT IMAGING | Age: 61
End: 2021-03-22
Attending: EMERGENCY MEDICINE
Payer: MEDICAID

## 2021-03-22 DIAGNOSIS — E87.1 HYPONATREMIA: Primary | ICD-10-CM

## 2021-03-22 LAB
ALBUMIN SERPL-MCNC: 3.4 G/DL (ref 3.4–5)
ALBUMIN/GLOB SERPL: 0.9 {RATIO} (ref 0.8–1.7)
ALP SERPL-CCNC: 149 U/L (ref 45–117)
ALT SERPL-CCNC: 141 U/L (ref 13–56)
AMPHET UR QL SCN: NEGATIVE
ANION GAP SERPL CALC-SCNC: 10 MMOL/L (ref 3–18)
APPEARANCE UR: CLEAR
AST SERPL-CCNC: 221 U/L (ref 10–38)
BARBITURATES UR QL SCN: NEGATIVE
BASOPHILS # BLD: 0 K/UL (ref 0–0.1)
BASOPHILS NFR BLD: 0 % (ref 0–2)
BENZODIAZ UR QL: NEGATIVE
BILIRUB SERPL-MCNC: 0.4 MG/DL (ref 0.2–1)
BILIRUB UR QL: NEGATIVE
BUN SERPL-MCNC: 5 MG/DL (ref 7–18)
BUN/CREAT SERPL: 9 (ref 12–20)
CALCIUM SERPL-MCNC: 8.5 MG/DL (ref 8.5–10.1)
CANNABINOIDS UR QL SCN: NEGATIVE
CHLORIDE SERPL-SCNC: 85 MMOL/L (ref 100–111)
CO2 SERPL-SCNC: 22 MMOL/L (ref 21–32)
COCAINE UR QL SCN: NEGATIVE
COLOR UR: YELLOW
CREAT SERPL-MCNC: 0.56 MG/DL (ref 0.6–1.3)
DIFFERENTIAL METHOD BLD: ABNORMAL
EOSINOPHIL # BLD: 0.1 K/UL (ref 0–0.4)
EOSINOPHIL NFR BLD: 2 % (ref 0–5)
ERYTHROCYTE [DISTWIDTH] IN BLOOD BY AUTOMATED COUNT: 14.8 % (ref 11.6–14.5)
ETHANOL SERPL-MCNC: 261 MG/DL (ref 0–3)
GLOBULIN SER CALC-MCNC: 3.9 G/DL (ref 2–4)
GLUCOSE SERPL-MCNC: 91 MG/DL (ref 74–99)
GLUCOSE UR STRIP.AUTO-MCNC: NEGATIVE MG/DL
HCT VFR BLD AUTO: 42.3 % (ref 35–45)
HDSCOM,HDSCOM: NORMAL
HGB BLD-MCNC: 14.5 G/DL (ref 12–16)
HGB UR QL STRIP: NEGATIVE
KETONES UR QL STRIP.AUTO: NEGATIVE MG/DL
LEUKOCYTE ESTERASE UR QL STRIP.AUTO: NEGATIVE
LYMPHOCYTES # BLD: 1.9 K/UL (ref 0.9–3.6)
LYMPHOCYTES NFR BLD: 26 % (ref 21–52)
MAGNESIUM SERPL-MCNC: 1.8 MG/DL (ref 1.6–2.6)
MCH RBC QN AUTO: 32.4 PG (ref 24–34)
MCHC RBC AUTO-ENTMCNC: 34.3 G/DL (ref 31–37)
MCV RBC AUTO: 94.6 FL (ref 74–97)
METHADONE UR QL: NEGATIVE
MONOCYTES # BLD: 0.9 K/UL (ref 0.05–1.2)
MONOCYTES NFR BLD: 13 % (ref 3–10)
NEUTS SEG # BLD: 4.5 K/UL (ref 1.8–8)
NEUTS SEG NFR BLD: 59 % (ref 40–73)
NITRITE UR QL STRIP.AUTO: NEGATIVE
OPIATES UR QL: NEGATIVE
PCP UR QL: NEGATIVE
PH UR STRIP: 6.5 [PH] (ref 5–8)
PLATELET # BLD AUTO: 314 K/UL (ref 135–420)
PMV BLD AUTO: 9.9 FL (ref 9.2–11.8)
POTASSIUM SERPL-SCNC: 3.7 MMOL/L (ref 3.5–5.5)
PROT SERPL-MCNC: 7.3 G/DL (ref 6.4–8.2)
PROT UR STRIP-MCNC: NEGATIVE MG/DL
RBC # BLD AUTO: 4.47 M/UL (ref 4.2–5.3)
SODIUM SERPL-SCNC: 117 MMOL/L (ref 136–145)
SP GR UR REFRACTOMETRY: 1 (ref 1–1.03)
UROBILINOGEN UR QL STRIP.AUTO: 0.2 EU/DL (ref 0.2–1)
WBC # BLD AUTO: 7.5 K/UL (ref 4.6–13.2)

## 2021-03-22 PROCEDURE — 70450 CT HEAD/BRAIN W/O DYE: CPT

## 2021-03-22 PROCEDURE — 80307 DRUG TEST PRSMV CHEM ANLYZR: CPT

## 2021-03-22 PROCEDURE — 82077 ASSAY SPEC XCP UR&BREATH IA: CPT

## 2021-03-22 PROCEDURE — 81003 URINALYSIS AUTO W/O SCOPE: CPT

## 2021-03-22 PROCEDURE — 85025 COMPLETE CBC W/AUTO DIFF WBC: CPT

## 2021-03-22 PROCEDURE — 80053 COMPREHEN METABOLIC PANEL: CPT

## 2021-03-22 PROCEDURE — 83735 ASSAY OF MAGNESIUM: CPT

## 2021-03-22 PROCEDURE — 83930 ASSAY OF BLOOD OSMOLALITY: CPT

## 2021-03-22 PROCEDURE — 99285 EMERGENCY DEPT VISIT HI MDM: CPT

## 2021-03-22 PROCEDURE — 93005 ELECTROCARDIOGRAM TRACING: CPT

## 2021-03-22 RX ORDER — HALOPERIDOL 5 MG/ML
2.5 INJECTION INTRAMUSCULAR
Status: COMPLETED | OUTPATIENT
Start: 2021-03-22 | End: 2021-03-23

## 2021-03-22 NOTE — ED NOTES
Patient presents to the ED seeking assistance for ETOH abuse, states she has been drinking for the past five days, not eating food, patient brought by EMS

## 2021-03-23 ENCOUNTER — HOSPITAL ENCOUNTER (INPATIENT)
Age: 61
LOS: 3 days | Discharge: HOME | End: 2021-03-26
Attending: FAMILY MEDICINE | Admitting: FAMILY MEDICINE
Payer: MEDICAID

## 2021-03-23 ENCOUNTER — APPOINTMENT (OUTPATIENT)
Dept: ULTRASOUND IMAGING | Age: 61
End: 2021-03-23
Attending: FAMILY MEDICINE
Payer: MEDICAID

## 2021-03-23 VITALS
HEART RATE: 75 BPM | WEIGHT: 150 LBS | RESPIRATION RATE: 15 BRPM | DIASTOLIC BLOOD PRESSURE: 63 MMHG | TEMPERATURE: 98.4 F | HEIGHT: 69 IN | OXYGEN SATURATION: 100 % | SYSTOLIC BLOOD PRESSURE: 113 MMHG | BODY MASS INDEX: 22.22 KG/M2

## 2021-03-23 PROBLEM — E87.1 HYPONATREMIA: Status: ACTIVE | Noted: 2021-03-23

## 2021-03-23 PROBLEM — F10.20 ETOH DEPENDENCE (HCC): Status: ACTIVE | Noted: 2021-03-23

## 2021-03-23 LAB
ALBUMIN SERPL-MCNC: 3.3 G/DL (ref 3.5–5)
ALBUMIN/GLOB SERPL: 0.9 {RATIO} (ref 1.1–2.2)
ALP SERPL-CCNC: 138 U/L (ref 45–117)
ALT SERPL-CCNC: 125 U/L (ref 12–78)
ANION GAP SERPL CALC-SCNC: 10 MMOL/L (ref 3–18)
ANION GAP SERPL CALC-SCNC: 10 MMOL/L (ref 5–15)
ANION GAP SERPL CALC-SCNC: 8 MMOL/L (ref 5–15)
APAP SERPL-MCNC: <2 UG/ML (ref 10–30)
APTT PPP: 23.4 SEC (ref 22.1–31)
AST SERPL-CCNC: 173 U/L (ref 15–37)
ATRIAL RATE: 67 BPM
BASOPHILS # BLD: 0 K/UL (ref 0–0.1)
BASOPHILS NFR BLD: 0 % (ref 0–1)
BILIRUB SERPL-MCNC: 0.4 MG/DL (ref 0.2–1)
BUN SERPL-MCNC: 7 MG/DL (ref 6–20)
BUN SERPL-MCNC: 7 MG/DL (ref 6–20)
BUN SERPL-MCNC: 7 MG/DL (ref 7–18)
BUN SERPL-MCNC: 9 MG/DL (ref 6–20)
BUN SERPL-MCNC: 9 MG/DL (ref 6–20)
BUN/CREAT SERPL: 12 (ref 12–20)
BUN/CREAT SERPL: 12 (ref 12–20)
BUN/CREAT SERPL: 14 (ref 12–20)
BUN/CREAT SERPL: 14 (ref 12–20)
BUN/CREAT SERPL: 16 (ref 12–20)
CALCIUM SERPL-MCNC: 7.7 MG/DL (ref 8.5–10.1)
CALCIUM SERPL-MCNC: 8.1 MG/DL (ref 8.5–10.1)
CALCIUM SERPL-MCNC: 8.2 MG/DL (ref 8.5–10.1)
CALCIUM SERPL-MCNC: 8.2 MG/DL (ref 8.5–10.1)
CALCIUM SERPL-MCNC: 8.8 MG/DL (ref 8.5–10.1)
CALCULATED P AXIS, ECG09: 55 DEGREES
CALCULATED R AXIS, ECG10: 50 DEGREES
CALCULATED T AXIS, ECG11: 56 DEGREES
CHLORIDE SERPL-SCNC: 100 MMOL/L (ref 97–108)
CHLORIDE SERPL-SCNC: 90 MMOL/L (ref 97–108)
CHLORIDE SERPL-SCNC: 92 MMOL/L (ref 100–111)
CHLORIDE SERPL-SCNC: 92 MMOL/L (ref 97–108)
CHLORIDE SERPL-SCNC: 98 MMOL/L (ref 97–108)
CO2 SERPL-SCNC: 23 MMOL/L (ref 21–32)
CO2 SERPL-SCNC: 24 MMOL/L (ref 21–32)
CO2 SERPL-SCNC: 24 MMOL/L (ref 21–32)
CO2 SERPL-SCNC: 25 MMOL/L (ref 21–32)
CO2 SERPL-SCNC: 25 MMOL/L (ref 21–32)
COMMENT, HOLDF: NORMAL
CREAT SERPL-MCNC: 0.49 MG/DL (ref 0.55–1.02)
CREAT SERPL-MCNC: 0.57 MG/DL (ref 0.55–1.02)
CREAT SERPL-MCNC: 0.58 MG/DL (ref 0.6–1.3)
CREAT SERPL-MCNC: 0.6 MG/DL (ref 0.55–1.02)
CREAT SERPL-MCNC: 0.64 MG/DL (ref 0.55–1.02)
DIAGNOSIS, 93000: NORMAL
DIFFERENTIAL METHOD BLD: ABNORMAL
EOSINOPHIL # BLD: 0 K/UL (ref 0–0.4)
EOSINOPHIL NFR BLD: 0 % (ref 0–7)
ERYTHROCYTE [DISTWIDTH] IN BLOOD BY AUTOMATED COUNT: 15.2 % (ref 11.5–14.5)
GLOBULIN SER CALC-MCNC: 3.7 G/DL (ref 2–4)
GLUCOSE SERPL-MCNC: 108 MG/DL (ref 74–99)
GLUCOSE SERPL-MCNC: 125 MG/DL (ref 65–100)
GLUCOSE SERPL-MCNC: 143 MG/DL (ref 65–100)
GLUCOSE SERPL-MCNC: 215 MG/DL (ref 65–100)
GLUCOSE SERPL-MCNC: 217 MG/DL (ref 65–100)
HCT VFR BLD AUTO: 37.2 % (ref 35–47)
HGB BLD-MCNC: 13 G/DL (ref 11.5–16)
IMM GRANULOCYTES # BLD AUTO: 0 K/UL (ref 0–0.04)
IMM GRANULOCYTES NFR BLD AUTO: 1 % (ref 0–0.5)
INR PPP: 1 (ref 0.9–1.1)
LYMPHOCYTES # BLD: 0.5 K/UL (ref 0.8–3.5)
LYMPHOCYTES NFR BLD: 13 % (ref 12–49)
MAGNESIUM SERPL-MCNC: 1.8 MG/DL (ref 1.6–2.4)
MCH RBC QN AUTO: 32.5 PG (ref 26–34)
MCHC RBC AUTO-ENTMCNC: 34.9 G/DL (ref 30–36.5)
MCV RBC AUTO: 93 FL (ref 80–99)
MONOCYTES # BLD: 0.1 K/UL (ref 0–1)
MONOCYTES NFR BLD: 2 % (ref 5–13)
NEUTS SEG # BLD: 3.3 K/UL (ref 1.8–8)
NEUTS SEG NFR BLD: 84 % (ref 32–75)
NRBC # BLD: 0 K/UL (ref 0–0.01)
NRBC BLD-RTO: 0 PER 100 WBC
OSMOLALITY SERPL: 278 MOSM/KG H2O
OSMOLALITY UR: 115 MOSM/KG H2O
P-R INTERVAL, ECG05: 134 MS
PLATELET # BLD AUTO: 292 K/UL (ref 150–400)
PMV BLD AUTO: 9.8 FL (ref 8.9–12.9)
POTASSIUM SERPL-SCNC: 3.3 MMOL/L (ref 3.5–5.1)
POTASSIUM SERPL-SCNC: 3.4 MMOL/L (ref 3.5–5.5)
POTASSIUM SERPL-SCNC: 3.9 MMOL/L (ref 3.5–5.1)
POTASSIUM SERPL-SCNC: 4.2 MMOL/L (ref 3.5–5.1)
POTASSIUM SERPL-SCNC: 4.8 MMOL/L (ref 3.5–5.1)
PROT SERPL-MCNC: 7 G/DL (ref 6.4–8.2)
PROTHROMBIN TIME: 10.8 SEC (ref 9–11.1)
Q-T INTERVAL, ECG07: 498 MS
QRS DURATION, ECG06: 98 MS
QTC CALCULATION (BEZET), ECG08: 526 MS
RBC # BLD AUTO: 4 M/UL (ref 3.8–5.2)
RBC MORPH BLD: ABNORMAL
SAMPLES BEING HELD,HOLD: NORMAL
SODIUM SERPL-SCNC: 122 MMOL/L (ref 136–145)
SODIUM SERPL-SCNC: 125 MMOL/L (ref 136–145)
SODIUM SERPL-SCNC: 127 MMOL/L (ref 136–145)
SODIUM SERPL-SCNC: 131 MMOL/L (ref 136–145)
SODIUM SERPL-SCNC: 132 MMOL/L (ref 136–145)
SODIUM UR-SCNC: 10 MMOL/L
THERAPEUTIC RANGE,PTTT: NORMAL SECS (ref 58–77)
TSH SERPL DL<=0.05 MIU/L-ACNC: 1.4 UIU/ML (ref 0.36–3.74)
VENTRICULAR RATE, ECG03: 67 BPM
WBC # BLD AUTO: 3.9 K/UL (ref 3.6–11)

## 2021-03-23 PROCEDURE — 74011000250 HC RX REV CODE- 250: Performed by: INTERNAL MEDICINE

## 2021-03-23 PROCEDURE — 74011250636 HC RX REV CODE- 250/636: Performed by: INTERNAL MEDICINE

## 2021-03-23 PROCEDURE — 74011250636 HC RX REV CODE- 250/636: Performed by: EMERGENCY MEDICINE

## 2021-03-23 PROCEDURE — 96374 THER/PROPH/DIAG INJ IV PUSH: CPT

## 2021-03-23 PROCEDURE — 80053 COMPREHEN METABOLIC PANEL: CPT

## 2021-03-23 PROCEDURE — 93005 ELECTROCARDIOGRAM TRACING: CPT

## 2021-03-23 PROCEDURE — 74011250636 HC RX REV CODE- 250/636: Performed by: FAMILY MEDICINE

## 2021-03-23 PROCEDURE — 76705 ECHO EXAM OF ABDOMEN: CPT

## 2021-03-23 PROCEDURE — 80143 DRUG ASSAY ACETAMINOPHEN: CPT

## 2021-03-23 PROCEDURE — 2709999900 HC NON-CHARGEABLE SUPPLY

## 2021-03-23 PROCEDURE — 84300 ASSAY OF URINE SODIUM: CPT

## 2021-03-23 PROCEDURE — 74011250637 HC RX REV CODE- 250/637: Performed by: INTERNAL MEDICINE

## 2021-03-23 PROCEDURE — 80074 ACUTE HEPATITIS PANEL: CPT

## 2021-03-23 PROCEDURE — 36415 COLL VENOUS BLD VENIPUNCTURE: CPT

## 2021-03-23 PROCEDURE — 85730 THROMBOPLASTIN TIME PARTIAL: CPT

## 2021-03-23 PROCEDURE — 83935 ASSAY OF URINE OSMOLALITY: CPT

## 2021-03-23 PROCEDURE — 80048 BASIC METABOLIC PNL TOTAL CA: CPT

## 2021-03-23 PROCEDURE — 65660000000 HC RM CCU STEPDOWN

## 2021-03-23 PROCEDURE — 85025 COMPLETE CBC W/AUTO DIFF WBC: CPT

## 2021-03-23 PROCEDURE — C9113 INJ PANTOPRAZOLE SODIUM, VIA: HCPCS | Performed by: FAMILY MEDICINE

## 2021-03-23 PROCEDURE — 84443 ASSAY THYROID STIM HORMONE: CPT

## 2021-03-23 PROCEDURE — 83735 ASSAY OF MAGNESIUM: CPT

## 2021-03-23 PROCEDURE — 83930 ASSAY OF BLOOD OSMOLALITY: CPT

## 2021-03-23 PROCEDURE — 85610 PROTHROMBIN TIME: CPT

## 2021-03-23 PROCEDURE — 96375 TX/PRO/DX INJ NEW DRUG ADDON: CPT

## 2021-03-23 PROCEDURE — 74011000250 HC RX REV CODE- 250: Performed by: FAMILY MEDICINE

## 2021-03-23 RX ORDER — TRAZODONE HYDROCHLORIDE 100 MG/1
100 TABLET ORAL
Status: ON HOLD | COMMUNITY
End: 2021-03-26 | Stop reason: SDUPTHER

## 2021-03-23 RX ORDER — TRAZODONE HYDROCHLORIDE 150 MG/1
150 TABLET ORAL
Status: ON HOLD | COMMUNITY
End: 2021-03-23

## 2021-03-23 RX ORDER — SODIUM CHLORIDE, SODIUM LACTATE, POTASSIUM CHLORIDE, CALCIUM CHLORIDE 600; 310; 30; 20 MG/100ML; MG/100ML; MG/100ML; MG/100ML
100 INJECTION, SOLUTION INTRAVENOUS CONTINUOUS
Status: DISCONTINUED | OUTPATIENT
Start: 2021-03-23 | End: 2021-03-23 | Stop reason: HOSPADM

## 2021-03-23 RX ORDER — BUPROPION HYDROCHLORIDE 150 MG/1
150 TABLET, EXTENDED RELEASE ORAL 2 TIMES DAILY
Status: ON HOLD | COMMUNITY
End: 2021-03-26 | Stop reason: SDUPTHER

## 2021-03-23 RX ORDER — PHENOL/SODIUM PHENOLATE
20 AEROSOL, SPRAY (ML) MUCOUS MEMBRANE DAILY
COMMUNITY

## 2021-03-23 RX ORDER — FLUTICASONE PROPIONATE AND SALMETEROL 250; 50 UG/1; UG/1
1 POWDER RESPIRATORY (INHALATION) EVERY 12 HOURS
Status: ON HOLD | COMMUNITY
End: 2021-03-26 | Stop reason: SDUPTHER

## 2021-03-23 RX ORDER — PROPRANOLOL HYDROCHLORIDE 10 MG/1
10 TABLET ORAL DAILY
Status: DISCONTINUED | OUTPATIENT
Start: 2021-03-24 | End: 2021-03-26 | Stop reason: HOSPADM

## 2021-03-23 RX ORDER — PROPRANOLOL HYDROCHLORIDE 10 MG/1
10 TABLET ORAL
COMMUNITY

## 2021-03-23 RX ORDER — FLUOXETINE HYDROCHLORIDE 20 MG/1
40 CAPSULE ORAL DAILY
Status: DISCONTINUED | OUTPATIENT
Start: 2021-03-24 | End: 2021-03-26 | Stop reason: HOSPADM

## 2021-03-23 RX ORDER — BUPROPION HYDROCHLORIDE 150 MG/1
150 TABLET, EXTENDED RELEASE ORAL 2 TIMES DAILY
Status: DISCONTINUED | OUTPATIENT
Start: 2021-03-23 | End: 2021-03-26 | Stop reason: HOSPADM

## 2021-03-23 RX ORDER — FLUTICASONE PROPIONATE 50 MCG
2 SPRAY, SUSPENSION (ML) NASAL DAILY
Status: DISCONTINUED | OUTPATIENT
Start: 2021-03-24 | End: 2021-03-26 | Stop reason: HOSPADM

## 2021-03-23 RX ORDER — CYCLOBENZAPRINE HCL 10 MG
5 TABLET ORAL
Status: DISCONTINUED | OUTPATIENT
Start: 2021-03-23 | End: 2021-03-26 | Stop reason: HOSPADM

## 2021-03-23 RX ORDER — CYCLOBENZAPRINE HCL 5 MG
5 TABLET ORAL
Status: ON HOLD | COMMUNITY
End: 2021-03-26 | Stop reason: SDUPTHER

## 2021-03-23 RX ORDER — ASPIRIN 325 MG/1
100 TABLET, FILM COATED ORAL DAILY
Status: DISCONTINUED | OUTPATIENT
Start: 2021-03-24 | End: 2021-03-26 | Stop reason: HOSPADM

## 2021-03-23 RX ORDER — LORAZEPAM 2 MG/ML
4 INJECTION INTRAMUSCULAR
Status: DISCONTINUED | OUTPATIENT
Start: 2021-03-23 | End: 2021-03-25

## 2021-03-23 RX ORDER — FLUTICASONE PROPIONATE 50 MCG
2 SPRAY, SUSPENSION (ML) NASAL DAILY
Status: ON HOLD | COMMUNITY
End: 2021-03-26 | Stop reason: SDUPTHER

## 2021-03-23 RX ORDER — TRAZODONE HYDROCHLORIDE 100 MG/1
100 TABLET ORAL
Status: DISCONTINUED | OUTPATIENT
Start: 2021-03-23 | End: 2021-03-26 | Stop reason: HOSPADM

## 2021-03-23 RX ORDER — GLUCOSAMINE/CHONDR SU A SOD 750-600 MG
2500 TABLET ORAL DAILY
COMMUNITY
End: 2021-03-26

## 2021-03-23 RX ORDER — SODIUM CHLORIDE 0.9 % (FLUSH) 0.9 %
5-40 SYRINGE (ML) INJECTION EVERY 8 HOURS
Status: DISCONTINUED | OUTPATIENT
Start: 2021-03-23 | End: 2021-03-26 | Stop reason: HOSPADM

## 2021-03-23 RX ORDER — SOFT LENS ADJUNCTIVE SOLUTIONS
1 DROPS OPHTHALMIC (EYE)
COMMUNITY

## 2021-03-23 RX ORDER — DEXTROSE MONOHYDRATE 50 MG/ML
250 INJECTION, SOLUTION INTRAVENOUS CONTINUOUS
Status: DISCONTINUED | OUTPATIENT
Start: 2021-03-23 | End: 2021-03-23

## 2021-03-23 RX ORDER — PROPRANOLOL HYDROCHLORIDE 10 MG/1
10 TABLET ORAL DAILY
Status: ON HOLD | COMMUNITY
End: 2021-03-26 | Stop reason: SDUPTHER

## 2021-03-23 RX ORDER — SODIUM CHLORIDE 0.9 % (FLUSH) 0.9 %
5-40 SYRINGE (ML) INJECTION AS NEEDED
Status: DISCONTINUED | OUTPATIENT
Start: 2021-03-23 | End: 2021-03-26 | Stop reason: HOSPADM

## 2021-03-23 RX ORDER — LORAZEPAM 2 MG/ML
2 INJECTION INTRAMUSCULAR
Status: DISCONTINUED | OUTPATIENT
Start: 2021-03-23 | End: 2021-03-25

## 2021-03-23 RX ORDER — FOLIC ACID 1 MG/1
1 TABLET ORAL DAILY
Status: DISCONTINUED | OUTPATIENT
Start: 2021-03-24 | End: 2021-03-26 | Stop reason: HOSPADM

## 2021-03-23 RX ORDER — BUPROPION HYDROCHLORIDE 100 MG/1
100 TABLET, EXTENDED RELEASE ORAL 2 TIMES DAILY
Status: ON HOLD | COMMUNITY
End: 2021-03-23

## 2021-03-23 RX ORDER — DESMOPRESSIN ACETATE 4 UG/ML
4 INJECTION, SOLUTION INTRAVENOUS; SUBCUTANEOUS ONCE
Status: COMPLETED | OUTPATIENT
Start: 2021-03-23 | End: 2021-03-23

## 2021-03-23 RX ADMIN — BUPROPION HYDROCHLORIDE 150 MG: 150 TABLET, EXTENDED RELEASE ORAL at 17:33

## 2021-03-23 RX ADMIN — METHYLPREDNISOLONE SODIUM SUCCINATE 50 MG: 125 INJECTION, POWDER, FOR SOLUTION INTRAMUSCULAR; INTRAVENOUS at 00:43

## 2021-03-23 RX ADMIN — PANTOPRAZOLE SODIUM 40 MG: 40 INJECTION, POWDER, FOR SOLUTION INTRAVENOUS at 08:34

## 2021-03-23 RX ADMIN — PANTOPRAZOLE SODIUM 40 MG: 40 INJECTION, POWDER, FOR SOLUTION INTRAVENOUS at 21:59

## 2021-03-23 RX ADMIN — HALOPERIDOL LACTATE 2.5 MG: 5 INJECTION, SOLUTION INTRAMUSCULAR at 00:43

## 2021-03-23 RX ADMIN — THIAMINE HYDROCHLORIDE: 100 INJECTION, SOLUTION INTRAMUSCULAR; INTRAVENOUS at 17:22

## 2021-03-23 RX ADMIN — SODIUM CHLORIDE, SODIUM LACTATE, POTASSIUM CHLORIDE, AND CALCIUM CHLORIDE 100 ML/HR: 600; 310; 30; 20 INJECTION, SOLUTION INTRAVENOUS at 02:27

## 2021-03-23 RX ADMIN — DEXTROSE MONOHYDRATE 250 ML/HR: 50 INJECTION, SOLUTION INTRAVENOUS at 08:33

## 2021-03-23 RX ADMIN — DEXTROSE MONOHYDRATE 250 ML/HR: 50 INJECTION, SOLUTION INTRAVENOUS at 12:49

## 2021-03-23 RX ADMIN — OCTREOTIDE ACETATE 50 MCG/HR: 500 INJECTION, SOLUTION INTRAVENOUS; SUBCUTANEOUS at 07:21

## 2021-03-23 RX ADMIN — Medication 10 ML: at 14:16

## 2021-03-23 RX ADMIN — THIAMINE HYDROCHLORIDE: 100 INJECTION, SOLUTION INTRAMUSCULAR; INTRAVENOUS at 06:04

## 2021-03-23 RX ADMIN — SODIUM CHLORIDE, SODIUM LACTATE, POTASSIUM CHLORIDE, AND CALCIUM CHLORIDE 1000 ML: 600; 310; 30; 20 INJECTION, SOLUTION INTRAVENOUS at 00:43

## 2021-03-23 RX ADMIN — LORAZEPAM 2 MG: 2 INJECTION INTRAMUSCULAR; INTRAVENOUS at 08:50

## 2021-03-23 RX ADMIN — Medication 10 ML: at 21:59

## 2021-03-23 RX ADMIN — DESMOPRESSIN ACETATE 4 MCG: 4 SOLUTION INTRAVENOUS at 08:34

## 2021-03-23 RX ADMIN — Medication 10 ML: at 05:31

## 2021-03-23 NOTE — ED NOTES
TRANSFER - OUT REPORT:    Verbal report given to Shelley Lee RN on Reather Pott  being transferred to [facility] ICU for routine progression of care       Report consisted of patients Situation, Background, Assessment and   Recommendations(SBAR). Information from the following report(s) SBAR, ED Summary, Procedure Summary, MAR, Recent Results and Cardiac Rhythm NSR normal sinus rythym was reviewed with the receiving nurse. Lines:   Peripheral IV 03/22/21 Right Hand (Active)       Peripheral IV 03/23/21 Left Antecubital (Active)   Site Assessment Clean, dry, & intact 03/23/21 0050   Phlebitis Assessment 0 03/23/21 0050   Infiltration Assessment 0 03/23/21 0050   Dressing Status Clean, dry, & intact 03/23/21 0050   Dressing Type Transparent 03/23/21 0050   Hub Color/Line Status Pink 03/23/21 0050        Opportunity for questions and clarification was provided.       Patient transported with:   Monitor

## 2021-03-23 NOTE — PROGRESS NOTES
[facility]  [address] [facility], [place], [address] [place] [address]  [phone]      Medical Progress Note      NAME: Charmayne Ash   :  1960  MRM:  [id number]    Date of service: 3/23/2021  7:42 AM       Assessment and Plan:   1. ETOH dependence with acute intoxication-last drink the evening of 3/22, ETOH 261on admission. Pt stated that she has been drinking binge for the last 1 week. Continue CIWAA with PRN ativan, vit N76, folic acid     2. Hyponatremia. Likely due alcohol abuse. Initial Na 117. Over corrected at OSH. Started D5 and DDAVP was given. Check TSH, osmolalities and urine sodium. Appreciated renal evaluation      3. Elevated Transaminases. -likely related to acute ETOH use. Check acute hepatitis, and acetaminophen level     4. Melena. This happened when she was binge drinking, but not now. Check hemoccult. PPI. H/H stable. Subjective:     Chief Complaint[de-identified] Patient was seen and examined as a follow up for alcohol abuse. Chart was reviewed. no withdrawal so far     ROS:  (bold if positive, if negative)    Tolerating PT  Tolerating Diet        Objective:     Last 24hrs VS reviewed since prior progress note.  Most recent are:    Visit Vitals  /72   Pulse 74   Temp 98 °F (36.7 °C)   Resp 16   Ht 5' 9\" (1.753 m)   Wt 84.4 kg (186 lb 1.1 oz)   SpO2 99%   BMI 27.48 kg/m²     SpO2 Readings from Last 6 Encounters:   21 99%   21 100%   20 97%   20 97%   20 96%   10/13/18 100%            Intake/Output Summary (Last 24 hours) at 3/23/2021 0802  Last data filed at 3/23/2021 0600  Gross per 24 hour   Intake --   Output 200 ml   Net -200 ml        Physical Exam:    Gen:  Well-developed, well-nourished, in no acute distress  HEENT:  Pink conjunctivae, PERRL, hearing intact to voice, moist mucous membranes  Neck:  Supple, without masses, thyroid non-tender  Resp:  No accessory muscle use, clear breath sounds without wheezes rales or rhonchi  Card:  No murmurs, normal S1, S2 without thrills, bruits or peripheral edema  Abd:  Soft, non-tender, non-distended, normoactive bowel sounds are present, no palpable organomegaly and no detectable hernias  Lymph:  No cervical or inguinal adenopathy  Musc:  No cyanosis or clubbing  Skin:  No rashes or ulcers, skin turgor is good  Neuro:  Cranial nerves are grossly intact, no focal motor weakness, follows commands appropriately  Psych:  Good insight, oriented to person, place and time, alert  __________________________________________________________________  Medications Reviewed: (see below)  Medications:     Current Facility-Administered Medications   Medication Dose Route Frequency    sodium chloride (NS) flush 5-40 mL  5-40 mL IntraVENous Q8H    sodium chloride (NS) flush 5-40 mL  5-40 mL IntraVENous PRN    LORazepam (ATIVAN) injection 2 mg  2 mg IntraVENous Q1H PRN    LORazepam (ATIVAN) injection 4 mg  4 mg IntraVENous Q1H PRN    pantoprazole (PROTONIX) 40 mg in 0.9% sodium chloride 10 mL injection  40 mg IntraVENous Q12H    octreotide (SANDOSTATIN) 500 mcg in 0.9% sodium chloride 500 mL infusion  50 mcg/hr IntraVENous CONTINUOUS    desmopressin (DDAVP) 4 mcg/mL injection 4 mcg  4 mcg SubCUTAneous ONCE    dextrose 5% infusion  250 mL/hr IntraVENous CONTINUOUS        Lab Data Reviewed: (see below)  Lab Review:     Recent Labs     03/23/21  0631 03/22/21  1735   WBC 3.9 7.5   HGB 13.0 14.5   HCT 37.2 42.3    314     Recent Labs     03/23/21  0631 03/23/21  0045 03/22/21  1735   * 127* 117*   K 4.2 3.4* 3.7   CL 98 92* 85*   CO2 25 25 22   * 108* 91   BUN 7 7 5*   CREA 0.60 0.58* 0.56*   CA 8.8 8.2* 8.5   MG 1.8  --  1.8   ALB 3.3*  --  3.4   TBILI 0.4  --  0.4   *  --  141*   INR 1.0  --   --      Lab Results   Component Value Date/Time    Glucose,  (H) 11/01/2016 05:19 PM     No results for input(s): PH, PCO2, PO2, HCO3, FIO2 in the last 72 hours. Recent Labs     03/23/21  0631   INR 1.0     All Micro Results     Procedure Component Value Units Date/Time    MRSA CULTURE [[id number]]     Order Status: Sent Specimen: Nasal from Nares           I have reviewed notes of prior 24hr. Other pertinent lab: Total time spent with patient: 35 from 08:   I personally reviewed chart, notes, data and current medications in the medical record. I have personally examined and treated the patient at bedside during this period.                  Care Plan discussed with: Patient, Nursing Staff and >50% of time spent in counseling and coordination of care    Discussed:  Care Plan    Prophylaxis:  SCD's    Disposition:  Home w/Family           ___________________________________________________    Attending Physician: Gurmeet Wooten MD

## 2021-03-23 NOTE — H&P
[facility]  History and Physical    Primary Care Provider: Jazmine Hargrove MD  Date of Service:  3/23/2021    Subjective: Nando Mayo is a 63 y.o. female with hx ETOH dependence, nicotine dependence, COPD, depression, and HTN who presents as a transfer from . [facility] for hyponatremia, and ETOH intoxication with altered mental status. She presented herself seeking help for ETOH dependence after she reportedly went on a 5 day binge after being clean for 8months (12 beers + wine). Last drink was reported as 3/22/21 at noon. She has been in several detox centers, and has had withdrawal seizures and hallucinations in the past.  She denies fever, chills, upper respiratory sx, chest pain, dyspnea, abdominal pain, nausea, or vomiting. She does endorse black stools over the past several days. In the ED she was hypotensive to 86/56, her labs were significant for sodium of 117, Cl 85. AST was 221,and , albumin 3.4, T. Bili 0.4, and ETOH 261. CT head was negative. A comprehensive review of systems was negative except for that written in the History of Present Illness. Past Medical History:   Diagnosis Date    Alcohol abuse     States she has quit    Anxiety     Chronic obstructive pulmonary disease ([address]     Depression     Essential hypertension     Hypertension     Psychiatric disorder     Tobacco abuse       Past Surgical History:   Procedure Laterality Date    COLONOSCOPY N/A 2018    COLONOSCOPY performed by Lina Hernandez MD at [address] HX GYN           Prior to Admission medications    Medication Sig Start Date End Date Taking? Authorizing Provider   azithromycin (Zithromax Z-Vasu) 250 mg tablet Take 1st dose 24 hours after initial dose that was given in the ER. Then take 1 tablet daily until finished.  20   Toni Perez MD   ondansetron (Zofran ODT) 4 mg disintegrating tablet Take 1-2 Tabs by mouth every eight (8) hours as needed for Nausea or Vomiting. 12/8/20   Cm Gary MD   FLUoxetine (PROZAC) 40 mg capsule Take 1 Cap by mouth daily. 6/27/18   Vanessa Church MD     Allergies   Allergen Reactions    Codeine Nausea and Vomiting    Naproxen Nausea and Vomiting      Family History   Problem Relation Age of Onset    Diabetes Mother     Hypertension Mother     Heart Attack Mother     Hypertension Father     Diabetes Brother         SOCIAL HISTORY:  Patient resides at Home. Patient ambulates independently  Smoking history: vapes  Alcohol history:ETOH dependence        Objective:       Physical Exam:   Visit Vitals  Pulse 83   Temp 98 °F (36.7 °C)   Ht 5' 9\" (1.753 m)   Wt 84.4 kg (186 lb 1.1 oz)   BMI 27.48 kg/m²     General:  Alert, cooperative, no distress, appears stated age. Head:  Normocephalic, without obvious abnormality, atraumatic. Eyes:  Conjunctivae/corneas clear. PERRL, EOMs intact. Throat: Lips, mucosa, and tongue normal.   Neck: Supple, symmetrical, trachea midline   Back:   Symmetric, no curvature. ROM normal. N   Lungs:   Clear to auscultation bilaterally. Chest wall:  No tenderness or deformity. Heart:  Regular rate and rhythm, S1, S2 normal, no murmur, click, rub or gallop. Rectal:    Guaiac pending   Extremities: Extremities normal, atraumatic, no cyanosis or edema. Pulses: 2+ radial pulses   Skin: Skin color, texture, turgor normal. No rashes or lesions. Neurologic: Alert and oriented to person place time, and event. ECG:  Ordered, pending    Data Review: All diagnostic labs and studies have been reviewed. Assessment:     Active Problems:    * No active hospital problems.  *      Plan:   Transfer from [place] [id number] because hospital is closing 3/31    #ETOH dependence with acute intoxication, and hepatitis  -last drink the evening of 3/22, ETOH 261  -admit [facility] ICU  [facility]  -banana bag  -check INR to calculate madreys DF , check B12 and folate  -replete electrolytes prn  -daily CMP, Mg2+    #Hyponatremia  -initial Na 117  -over corrected to 127 s/p IVF at [address], and [facility] pending  -monitor    #Elevated Transaminases  -likely related to acute ETOH intox  -check acute hepatitis, and acetaminophen level    #Melena  -NPO  -hemoccult  -IV protonix+octreotide gtt  -GI consult if hemoccult positive    FEN: NPO  dvt ppx: SCD pending coags  MPOA: Hailee Sanchez (daughter)  Code: Full     FUNCTIONAL STATUS PRIOR TO HOSPITALIZATION Ambulates Independently (including history of recent falls): multiple falls    Signed By: Jose Luis Pearce MD     March 23, 2021

## 2021-03-23 NOTE — ED NOTES
Report given to CHANDA Darnell Cha, pt will be transported to [facility] ICU, [facility] ETA 90 minutes.

## 2021-03-23 NOTE — PROGRESS NOTES
Reason for Admission:  Hyponatremic,5 day binge (wine and beer) with last drink reported on 3/22/21                     RUR Score:       12%              Plan for utilizing home health: To be determined    PCP: First and Last name:  Chasidy Garcia MD     Name of Practice:    Are you a current patient: Yes/No: yes   Approximate date of last visit:    Can you participate in a virtual visit with your PCP: yes                    Current Advanced Directive/Advance Care Plan: Full Code      Healthcare Decision Maker: Yaa Knox @ [phone]. Transition of Care Plan:                     I met with pt as an introductory visit to begin disposition discussions. Pt lives by herself in a two story apartment in [facility]. Pt has 18 steps to get into her apartment ,Her daughter,Francisca lives near pt but daughter does not drive. Pt states she was sober for 8 moths but relapsed because an acquaintance \"got kicked out of an eÃ‡ift. \" and wanted to stay with me and \"this triggered my drinking.:  Pt was recently in the [address] [place] [address] Road and [facility] in [place]. Pt states [facility] is closing @ the end of this month and the other hospitals in [place] were on diversion. When discharged,pt states she will need medicaid transport home. I talked with pt about going back to rehab for ETOH abuse but pt states she does not feel like she needs it @ this juncture. Pt states she has been through withdrawal before and does not anticipate going through DTs this time  . I offered pt the services of Vladimir Crump ,addiction specialist and she is receptive so I am contacting Vladimir Crump who will call pt to discuss her relapse and relapse prevention.     Tiny Guaman  Case management

## 2021-03-23 NOTE — ED PROVIDER NOTES
EMERGENCY DEPARTMENT HISTORY AND PHYSICAL EXAM      Date: 3/22/2021  Patient Name: Jefry Samayoa    History of Presenting Illness     Chief Complaint   Patient presents with    Alcohol Problem       History (Context): Jefry Samayoa is a 63 y.o. with significant history of alcoholism, who presents with subacute onset, constant, severe altered mental status in the setting of alcohol intoxication. The patient says she has been on a 5-day binge after being clean for 8 months. She says she is drinking large amounts of Estrella beer. The patient's last drink was hours ago. The patient does have a history of complicated withdrawal.  The patient is now experiencing withdrawal symptoms. Patient is mildly belligerent. On review of systems, although possibly unreliable, the patient denies fever, chills, rashes, headache, chest pain, abdominal pain, diarrhea, dysuria, hematuria, bleeding, drug use, recent changes to medications, jaundice, recent travel. PCP: Jennifer Brady MD    Current Facility-Administered Medications   Medication Dose Route Frequency Provider Last Rate Last Admin    methylPREDNISolone (PF) (Solu-MEDROL) injection 50 mg  50 mg IntraVENous NOW Dajuan Fuentes MD        haloperidol lactate (HALDOL) injection 2.5 mg  2.5 mg IntraVENous NOW Dajuan Fuentes MD        lactated ringers bolus infusion 500 mL  500 mL IntraVENous ONCE Dajuan Fuentes MD         Current Outpatient Medications   Medication Sig Dispense Refill    azithromycin (Zithromax Z-Vasu) 250 mg tablet Take 1st dose 24 hours after initial dose that was given in the ER. Then take 1 tablet daily until finished. 4 Tab 0    ondansetron (Zofran ODT) 4 mg disintegrating tablet Take 1-2 Tabs by mouth every eight (8) hours as needed for Nausea or Vomiting. 15 Tab 1    FLUoxetine (PROZAC) 40 mg capsule Take 1 Cap by mouth daily.  30 Cap 5       Past History     Past Medical History:  Past Medical History:   Diagnosis Date    Alcohol abuse     States she has quit    Anxiety     Chronic obstructive pulmonary disease ([facility] [address]     Depression     Essential hypertension     Hypertension     Psychiatric disorder     Tobacco abuse        Past Surgical History:  Past Surgical History:   Procedure Laterality Date    COLONOSCOPY N/A 2018    COLON[facility] Flores MD at [facility] ENDOSCOPY    HX GYN             Family History:  Family History   Problem Relation Age of Onset    Diabetes Mother     Hypertension Mother     Heart Attack Mother     Hypertension Father     Diabetes Brother        Social History:  Social History     Tobacco Use    Smoking status: Current Every Day Smoker     Packs/day: 0.50    Smokeless tobacco: Never Used   Substance Use Topics    Alcohol use: Yes     Comment: \"as much as I want\"    Drug use: No       Allergies: Allergies   Allergen Reactions    Codeine Nausea and Vomiting    Naproxen Nausea and Vomiting       PMH, PSH, family history, social history, allergies reviewed with the patient with significant items noted above. Review of Systems   As per HPI, otherwise all systems reviewed and negative. Physical Exam     Vitals:    21 1745 21 1800 21 1815 21 2033   BP: (!) 102/56 (!) 86/56 (!) 96/48    Pulse: 69 73 67    Resp: 18 24 11    Temp:       SpO2:    100%       Gen: Intoxicated appearing  HEENT: Normocephalic, sclera anicteric  Cardiovascular: Normal rate, regular rhythm, no murmurs, rubs, gallops. Pulses intact and equal distally. Pulmonary: No respiratory distress. No stridor. Clear lungs. ABD: Soft, nontender, nondistended. Neuro: Alert. Slurred speech. Altered mentation. PERRLA. Cranial nerves II through XII intact. Normal strength and sensation throughout. Nystagmus present  Psych: alert. .  Odd affect. Thought content related to chief complaint. Mood \"shitty. \"    : No CVA tenderness  EXT: Moves all extremities well. No cyanosis or clubbing. Skin: Warm and well-perfused. Diagnostic Study Results     Labs -     Recent Results (from the past 12 hour(s))   CBC WITH AUTOMATED DIFF    Collection Time: 03/22/21  5:35 PM   Result Value Ref Range    WBC 7.5 4.6 - 13.2 K/uL    RBC 4.47 4.20 - 5.30 M/uL    HGB 14.5 12.0 - 16.0 g/dL    HCT 42.3 35.0 - 45.0 %    MCV 94.6 74.0 - 97.0 FL    MCH 32.4 24.0 - 34.0 PG    MCHC 34.3 31.0 - 37.0 g/dL    RDW 14.8 (H) 11.6 - 14.5 %    PLATELET 468 062 - 893 K/uL    MPV 9.9 9.2 - 11.8 FL    NEUTROPHILS 59 40 - 73 %    LYMPHOCYTES 26 21 - 52 %    MONOCYTES 13 (H) 3 - 10 %    EOSINOPHILS 2 0 - 5 %    BASOPHILS 0 0 - 2 %    ABS. NEUTROPHILS 4.5 1.8 - 8.0 K/UL    ABS. LYMPHOCYTES 1.9 0.9 - 3.6 K/UL    ABS. MONOCYTES 0.9 0.05 - 1.2 K/UL    ABS. EOSINOPHILS 0.1 0.0 - 0.4 K/UL    ABS. BASOPHILS 0.0 0.0 - 0.1 K/UL    DF AUTOMATED     METABOLIC PANEL, COMPREHENSIVE    Collection Time: 03/22/21  5:35 PM   Result Value Ref Range    Sodium 117 (LL) 136 - 145 mmol/L    Potassium 3.7 3.5 - 5.5 mmol/L    Chloride 85 (L) 100 - 111 mmol/L    CO2 22 21 - 32 mmol/L    Anion gap 10 3.0 - 18 mmol/L    Glucose 91 74 - 99 mg/dL    BUN 5 (L) 7.0 - 18 MG/DL    Creatinine 0.56 (L) 0.6 - 1.3 MG/DL    BUN/Creatinine ratio 9 (L) 12 - 20      GFR est AA >60 >60 ml/min/1.73m2    GFR est non-AA >60 >60 ml/min/1.73m2    Calcium 8.5 8.5 - 10.1 MG/DL    Bilirubin, total 0.4 0.2 - 1.0 MG/DL    ALT (SGPT) 141 (H) 13 - 56 U/L    AST (SGOT) 221 (H) 10 - 38 U/L    Alk.  phosphatase 149 (H) 45 - 117 U/L    Protein, total 7.3 6.4 - 8.2 g/dL    Albumin 3.4 3.4 - 5.0 g/dL    Globulin 3.9 2.0 - 4.0 g/dL    A-G Ratio 0.9 0.8 - 1.7     MAGNESIUM    Collection Time: 03/22/21  5:35 PM   Result Value Ref Range    Magnesium 1.8 1.6 - 2.6 mg/dL   ETHYL ALCOHOL    Collection Time: 03/22/21  5:35 PM   Result Value Ref Range    ALCOHOL(ETHYL),SERUM 261 (H) 0 - 3 MG/DL   URINALYSIS W/ RFLX MICROSCOPIC    Collection Time: 03/22/21  6:44 PM   Result Value Ref Range    Color YELLOW      Appearance CLEAR      Specific gravity 1.005 1.005 - 1.030      pH (UA) 6.5 5.0 - 8.0      Protein Negative NEG mg/dL    Glucose Negative NEG mg/dL    Ketone Negative NEG mg/dL    Bilirubin Negative NEG      Blood Negative NEG      Urobilinogen 0.2 0.2 - 1.0 EU/dL    Nitrites Negative NEG      Leukocyte Esterase Negative NEG     DRUG SCREEN, URINE    Collection Time: 03/22/21  6:44 PM   Result Value Ref Range    BENZODIAZEPINES Negative NEG      BARBITURATES Negative NEG      THC (TH-CANNABINOL) Negative NEG      OPIATES Negative NEG      PCP(PHENCYCLIDINE) Negative NEG      COCAINE Negative NEG      AMPHETAMINES Negative NEG      METHADONE Negative NEG      HDSCOM (NOTE)    EKG, 12 LEAD, INITIAL    Collection Time: 03/22/21  7:02 PM   Result Value Ref Range    Ventricular Rate 67 BPM    Atrial Rate 67 BPM    P-R Interval 134 ms    QRS Duration 98 ms    Q-T Interval 498 ms    QTC Calculation (Bezet) 526 ms    Calculated P Axis 55 degrees    Calculated R Axis 50 degrees    Calculated T Axis 56 degrees    Diagnosis       Normal sinus rhythm  Normal ECG  When compared with ECG of 17-AUG-2018 09:06,  QT has lengthened         Radiologic Studies -   CT HEAD WO CONT    (Results Pending)     CT Results  (Last 48 hours)    None        CXR Results  (Last 48 hours)    None            Medical Decision Making   I am the first provider for this patient. I reviewed the vital signs, available nursing notes, past medical history, past surgical history, family history and social history. Vital Signs-Reviewed the patient's vital signs. Records Reviewed: Personally, on initial evaluation    MDM:   Patient presents with alcohol intoxication. Exam significant for stigmata of alcohol intoxication. The patient's last drink was hours ago.     DDX considered: Alcohol intoxication, polysubstance intoxication  DDx considered but thought to be less likely based on history and physical above: Stroke, seizure, poisoning, encephalitis  Patient condition on initial evaluation: Moderately ill    Plan:   Close Observation  Cardiac monitoring    Orders as below:  Orders Placed This Encounter    CT HEAD WO CONT    CBC WITH AUTOMATED DIFF    COMPREHENSIVE METABOLIC PANEL    URINALYSIS W/ RFLX MICROSCOPIC    MAGNESIUM    ETHYL ALCOHOL    Urine Drug Screen    OSMOLALITY, UR    OSMOLALITY, SERUM/PLASMA    DIET REGULAR FR 1500ML    EKG, 12 LEAD, INITIAL    methylPREDNISolone (PF) (Solu-MEDROL) injection 50 mg    DISCONTD: lactated ringers bolus infusion 1,000 mL    haloperidol lactate (HALDOL) injection 2.5 mg    lactated ringers bolus infusion 500 mL        ED Course:   ED Course as of Mar 22 2333   Mon Mar 22, 2021   9989 Acute alcoholic hepatitis. Will treat with steroids. COMPREHENSIVE METABOLIC PANEL(!!):    Sodium 117(!!)   Potassium 3.7   Chloride 85(!)   CO2 22   Anion gap 10   Glucose 91   BUN 5(!)   Creatinine 0.56(!)   BUN/Creatinine ratio 9(!)   GFR est AA >60   GFR est non-AA >60   Calcium 8.5   Bilirubin, total 0.4   (!)   (!)   Alk. phosphatase 149(!)   Protein, total 7.3   Albumin 3.4   Globulin 3.9   A-G Ratio 0.9 [DT]   1911 Patient sodium 117. Given the drinking, this may represent beer potomania. Ordering urine and serum osms. Placing on free water restriction. Hospitalist declines admission. [DT]   1926 Expected   ALCOHOL(ETHYL),SERUM(!): 261 [DT]   2107 Spoke with the hospitalist at [facility] in [place]. Hospitalist suggest we speak to their intensivist.    [DT]   [phone] to the hospitalist at [facility], which was after speaking to many other hospitals he declined.    [DT]      ED Course User Index  [DT] Rodolfo Beauchamp MD         Patient condition at time of disposition: Critically ill      Critical Care Time:  The services I provided to this patient were to treat and/or prevent clinically significant deterioration that could result in the failure of one or more body systems and/or organ systems due to hyponatremia, likely due to beer potomania. Services included the following:  -reviewing nursing notes and old charts  -vital sign assessments  -direct patient care  -medication orders and management  -interpreting and reviewing diagnostic studies/labs  -re-evaluations  -documentation time  -Arranging transfer    Aggregate critical care time was 95 minutes, which includes only time during which I was engaged in work directly related to the patient's care as described above, whether I was at bedside or elsewhere in the Emergency Department. It did not include time spent performing other reported procedures or the services of residents, students, nurses, or advance practice providers. Lupe Nur MD    11:33 PM      Disposition: Transfer    Diagnosis     Clinical Impression:   1. Hyponatremia        Signed,  Suleman Aguilar MD  Emergency Physician  [facility]    As a voice dictation software was utilized to dictate this note, minor word transpositions can occur. I apologize for confusing wording and typographic errors. Please feel free to contact me for clarification.

## 2021-03-23 NOTE — PROGRESS NOTES
0700- Bedside and Verbal shift change report given to Renato  (oncoming nurse) by [employer] nurse). Report included the following information SBAR, Kardex, ED Summary, OR Summary, Intake/Output, MAR, Recent Results, Med Rec Status, Cardiac Rhythm sinus rhythm and Alarm Parameters . Assisted Ani in preceptor     1334- Dr Vernell Vargas at bedside, orders to start pt on D5 250 ml/hr as well as give desmopressin. 0800- Dr. Richie De Leon at bedside, regular diet ordered and will transfer pt to tele. [facility].Hayley- Dr. Rao Patrick at bedside on tele health, updated on pt condition. 1030- Attempted to call daughter X2, no answer. Will try again. Attempted call to Justa Chand, pt's sister, phone ringing out of service.

## 2021-03-23 NOTE — PROGRESS NOTES
1995 [facility] visit. Mrs. Dyana Ureña was sound asleep. She is Anabaptist. No family member was with her at the time fo the visit. Prayer for spiritual communion offered at bedside.     KETURAH Raza, RN, ACSW, LCSW   Page:  [phone]

## 2021-03-23 NOTE — PROGRESS NOTES
[facility]: MED RECONCILIATION      Medications added:   Propranolol  Wixela  Flexeril  Flonase  Opcon-A    Medications removed:  Azithromycin- zpak    Medications adjusted:  Trazodone  Bupropion    Information obtained from: Patient (alert, oriented, reliable), RxQuery (data available)      Allergies: Codeine and Naproxen    Prior to Admission Medications:     Medication Documentation Review Audit       Reviewed by Blanca Nelson, PHARMD (Pharmacist) on 03/23/21 at 1432      Medication Sig Documenting Provider Last Dose Status Taking? Biotin 2,500 mcg cap Take 2,500 mg by mouth daily. Provider, Historical  Active Yes   buPROPion SR (WELLBUTRIN SR) 150 mg SR tablet Take 150 mg by mouth two (2) times a day. Provider, Historical  Active Yes   cyclobenzaprine (FLEXERIL) 5 mg tablet Take 5 mg by mouth two (2) times daily as needed for Muscle Spasm(s). Provider, Historical  Active Yes   FLUoxetine (PROZAC) 40 mg capsule Take 1 Cap by mouth daily. Gennaro Khan MD  Active Yes   fluticasone propion-salmeteroL (Wixela Inhub) 250-50 mcg/dose diskus inhaler Take 1 Puff by inhalation every twelve (12) hours. Provider, Historical  Active Yes   fluticasone propionate (Flonase Allergy Relief) 50 mcg/actuation nasal spray 2 Sprays by Both Nostrils route daily. Provider, Historical  Active Yes   naphazoline-pheniramine (Opcon-A) ophthalmic solution Administer 1 Drop to both eyes daily as needed. Provider, Historical  Active Yes   Omeprazole delayed release (PRILOSEC D/R) 20 mg tablet Take 20 mg by mouth daily. Provider, Historical  Active Yes   ondansetron (Zofran ODT) 4 mg disintegrating tablet Take 1-2 Tabs by mouth every eight (8) hours as needed for Nausea or Vomiting. Ana Lim MD  Active Yes   propranoloL (INDERAL) 10 mg tablet Take 10 mg by mouth daily. Provider, Historical  Active Yes   propranoloL (INDERAL) 10 mg tablet Take 10 mg by mouth daily as needed for Anxiety.  Provider, Historical  Active Yes Med Note (Hernán Marti. Tue Mar 23, 2021  2:28 PM) Will take in the afternoon as needed. Can take TID if needed   traZODone (DESYREL) 100 mg tablet Take 100 mg by mouth nightly as needed for Sleep. Provider, Historical  Active Yes                      Darell Montgomery.  Everardo Heaton

## 2021-03-23 NOTE — PROGRESS NOTES
Problem: Falls - Risk of  Goal: *Absence of Falls  Description: Document Pedro Luis Laureano Fall Risk and appropriate interventions in the flowsheet.   3/23/2021 1319 by Debby Bran RN  Outcome: Progressing Towards Goal  Note: Fall Risk Interventions:  Mobility Interventions: Bed/chair exit alarm, Communicate number of staff needed for ambulation/transfer, Patient to call before getting OOB         Medication Interventions: Assess postural VS orthostatic hypotension, Bed/chair exit alarm, Evaluate medications/consider consulting pharmacy, Patient to call before getting OOB, Teach patient to arise slowly    Elimination Interventions: Bed/chair exit alarm, Call light in reach, Elevated toilet seat, Patient to call for help with toileting needs, Toilet paper/wipes in reach, Toileting schedule/hourly rounds    History of Falls Interventions: Bed/chair exit alarm, Door open when patient unattended, Evaluate medications/consider consulting pharmacy, Room close to nurse's station, Vital signs minimum Q4HRs X 24 hrs (comment for end date)         Problem: Patient Education: Go to Patient Education Activity  Goal: Patient/Family Education  3/23/2021 1320 by Debby Bran RN  Outcome: Progressing Towards Goal  3/23/2021 1319 by Debby Bran RN  Outcome: Progressing Towards Goal  3/23/2021 1319 by Debby Bran RN  Outcome: Progressing Towards Goal

## 2021-03-23 NOTE — PROGRESS NOTES
0700- Bedside and Verbal shift change report received from CHANDA White (offgoing nurse) by Ramsey Michelle RN (oncoming nurse). Report included the following information SBAR, Kardex, ED Summary, Intake/Output, MAR, Recent Results, Med Rec Status, Cardiac Rhythm Normal Sinus, Alarm Parameters  and Quality Measures. Primary Nurse Ramsey Michelle RN and CHANDA White performed a dual skin assessment on this patient See flowsheet. All fluids verified. 0800- Patient assessment performed. Patient alert and oriented x4. Follows commands and responds spontaneously. GCS 15 and RASS 0. CIWA assessed at a 9. Ordered ativan given. Pupils equal, round and reactive at 3mm. Follows and tracks with eyes. Facial symmetry intact. Patient speech clear. No neuro complaints. Patient strength equal in all extremities. Normal sinus on monitor in 80s. All pulses present in palpable, +2 in upper lower extremities. Skin warm, dry, and appropriate for race. Np edema present. Patient breathing equal and symmetric. No adventitious upper airway sounds. Lungs sound clear . No cough present. Patient is continent of bowel and bladder. Patient voids using the BSC . Bowel sounds active x4. LBM unknown. Dual skin assessment completed with Meredith Arreguin RN. No skin abnormalities. Patient does not complain of pain, nausea, vomiting, chest pain, or SOB. Patient RW 20g infiltrated and removed. LFA 20g patent, flushes well, with blood return. All pump settings verified. Patient educated on call bell and patient safety measures. Call bell in reach, bed in low and locked position, and reminded patient to use call bell. Patient board updated and care plan discussed with patient. 0900- Patient resting comfortably. Ordered lab drawn. 1030- Attempted to call back patient daughterLuis, twice to give requested update with no answer. Will try again later.     36- Interdisciplinary rounds complete with charge nurse, RT, pharmacy, nutrition, and other nursing staff.    1200- Reassessment performed. No changes noted. CIWA scored at a 2.    1400- Patient resting comfortably. Ultrasound technician in room to perform abd us. 1500- Called to update patient's daughter, Candi Alonso. Update given and all questions answered. 1600- Reassessment performed. No changes noted. Patient assisted to bedside commode. Patient care tech, [facility], in room to  Help with ADLs. [phone]- Patient daughter at bedside to visit. All questions answered with patient permission. 1800- Patient resting comfortably. 1900- Bedside and Verbal shift change report given to Keiko Magallon RN (oncoming nurse) by Cecille Liu RN (offgoing nurse). Report included the following information SBAR, Kardex, ED Summary, Intake/Output, MAR, Recent Results, Med Rec Status, Cardiac Rhythm Normal Sinus, Alarm Parameters  and Quality Measures.

## 2021-03-24 LAB
ALBUMIN SERPL-MCNC: 2.9 G/DL (ref 3.5–5)
ALBUMIN SERPL-MCNC: 2.9 G/DL (ref 3.5–5)
ALBUMIN/GLOB SERPL: 0.9 {RATIO} (ref 1.1–2.2)
ALP SERPL-CCNC: 106 U/L (ref 45–117)
ALT SERPL-CCNC: 91 U/L (ref 12–78)
ANION GAP SERPL CALC-SCNC: 6 MMOL/L (ref 5–15)
ANION GAP SERPL CALC-SCNC: 6 MMOL/L (ref 5–15)
ANION GAP SERPL CALC-SCNC: 7 MMOL/L (ref 5–15)
ANION GAP SERPL CALC-SCNC: 8 MMOL/L (ref 5–15)
AST SERPL-CCNC: 113 U/L (ref 15–37)
ATRIAL RATE: 71 BPM
BASOPHILS # BLD: 0 K/UL (ref 0–0.1)
BASOPHILS NFR BLD: 0 % (ref 0–1)
BILIRUB SERPL-MCNC: 0.5 MG/DL (ref 0.2–1)
BUN SERPL-MCNC: 6 MG/DL (ref 6–20)
BUN SERPL-MCNC: 6 MG/DL (ref 6–20)
BUN SERPL-MCNC: 8 MG/DL (ref 6–20)
BUN SERPL-MCNC: 8 MG/DL (ref 6–20)
BUN/CREAT SERPL: 14 (ref 12–20)
BUN/CREAT SERPL: 14 (ref 12–20)
BUN/CREAT SERPL: 16 (ref 12–20)
BUN/CREAT SERPL: 16 (ref 12–20)
CALCIUM SERPL-MCNC: 7.6 MG/DL (ref 8.5–10.1)
CALCIUM SERPL-MCNC: 7.7 MG/DL (ref 8.5–10.1)
CALCIUM SERPL-MCNC: 7.9 MG/DL (ref 8.5–10.1)
CALCIUM SERPL-MCNC: 8.2 MG/DL (ref 8.5–10.1)
CALCULATED P AXIS, ECG09: 113 DEGREES
CALCULATED R AXIS, ECG10: 157 DEGREES
CALCULATED T AXIS, ECG11: 153 DEGREES
CHLORIDE SERPL-SCNC: 88 MMOL/L (ref 97–108)
CHLORIDE SERPL-SCNC: 89 MMOL/L (ref 97–108)
CHLORIDE SERPL-SCNC: 96 MMOL/L (ref 97–108)
CHLORIDE SERPL-SCNC: 97 MMOL/L (ref 97–108)
CO2 SERPL-SCNC: 23 MMOL/L (ref 21–32)
CO2 SERPL-SCNC: 23 MMOL/L (ref 21–32)
CO2 SERPL-SCNC: 24 MMOL/L (ref 21–32)
CO2 SERPL-SCNC: 26 MMOL/L (ref 21–32)
CREAT SERPL-MCNC: 0.42 MG/DL (ref 0.55–1.02)
CREAT SERPL-MCNC: 0.43 MG/DL (ref 0.55–1.02)
CREAT SERPL-MCNC: 0.49 MG/DL (ref 0.55–1.02)
CREAT SERPL-MCNC: 0.51 MG/DL (ref 0.55–1.02)
DIAGNOSIS, 93000: NORMAL
DIFFERENTIAL METHOD BLD: ABNORMAL
EOSINOPHIL # BLD: 0.1 K/UL (ref 0–0.4)
EOSINOPHIL NFR BLD: 1 % (ref 0–7)
ERYTHROCYTE [DISTWIDTH] IN BLOOD BY AUTOMATED COUNT: 14.2 % (ref 11.5–14.5)
GLOBULIN SER CALC-MCNC: 3.3 G/DL (ref 2–4)
GLUCOSE SERPL-MCNC: 106 MG/DL (ref 65–100)
GLUCOSE SERPL-MCNC: 123 MG/DL (ref 65–100)
GLUCOSE SERPL-MCNC: 129 MG/DL (ref 65–100)
GLUCOSE SERPL-MCNC: 99 MG/DL (ref 65–100)
HCT VFR BLD AUTO: 31.8 % (ref 35–47)
HEMOCCULT STL QL: POSITIVE
HGB BLD-MCNC: 11.1 G/DL (ref 11.5–16)
IMM GRANULOCYTES # BLD AUTO: 0.1 K/UL (ref 0–0.04)
IMM GRANULOCYTES NFR BLD AUTO: 1 % (ref 0–0.5)
LYMPHOCYTES # BLD: 2.8 K/UL (ref 0.8–3.5)
LYMPHOCYTES NFR BLD: 33 % (ref 12–49)
MAGNESIUM SERPL-MCNC: 1.4 MG/DL (ref 1.6–2.4)
MCH RBC QN AUTO: 32.3 PG (ref 26–34)
MCHC RBC AUTO-ENTMCNC: 34.9 G/DL (ref 30–36.5)
MCV RBC AUTO: 92.4 FL (ref 80–99)
MONOCYTES # BLD: 1.1 K/UL (ref 0–1)
MONOCYTES NFR BLD: 12 % (ref 5–13)
NEUTS SEG # BLD: 4.4 K/UL (ref 1.8–8)
NEUTS SEG NFR BLD: 53 % (ref 32–75)
NRBC # BLD: 0 K/UL (ref 0–0.01)
NRBC BLD-RTO: 0 PER 100 WBC
OSMOLALITY SERPL: 293 MOSMOL/KG (ref 280–301)
OSMOLALITY UR: 160 MOSM/KG H2O
P-R INTERVAL, ECG05: 132 MS
PHOSPHATE SERPL-MCNC: 2 MG/DL (ref 2.6–4.7)
PHOSPHATE SERPL-MCNC: 2.2 MG/DL (ref 2.6–4.7)
PLATELET # BLD AUTO: 246 K/UL (ref 150–400)
PMV BLD AUTO: 10.1 FL (ref 8.9–12.9)
POTASSIUM SERPL-SCNC: 3 MMOL/L (ref 3.5–5.1)
POTASSIUM SERPL-SCNC: 3.9 MMOL/L (ref 3.5–5.1)
POTASSIUM SERPL-SCNC: 3.9 MMOL/L (ref 3.5–5.1)
POTASSIUM SERPL-SCNC: 4 MMOL/L (ref 3.5–5.1)
PROT SERPL-MCNC: 6.2 G/DL (ref 6.4–8.2)
Q-T INTERVAL, ECG07: 512 MS
QRS DURATION, ECG06: 78 MS
QTC CALCULATION (BEZET), ECG08: 556 MS
RBC # BLD AUTO: 3.44 M/UL (ref 3.8–5.2)
SODIUM SERPL-SCNC: 120 MMOL/L (ref 136–145)
SODIUM SERPL-SCNC: 120 MMOL/L (ref 136–145)
SODIUM SERPL-SCNC: 126 MMOL/L (ref 136–145)
SODIUM SERPL-SCNC: 127 MMOL/L (ref 136–145)
SODIUM UR-SCNC: 46 MMOL/L
UR CULT HOLD, URHOLD: NORMAL
VENTRICULAR RATE, ECG03: 71 BPM
WBC # BLD AUTO: 8.4 K/UL (ref 3.6–11)

## 2021-03-24 PROCEDURE — 83735 ASSAY OF MAGNESIUM: CPT

## 2021-03-24 PROCEDURE — 83935 ASSAY OF URINE OSMOLALITY: CPT

## 2021-03-24 PROCEDURE — 36415 COLL VENOUS BLD VENIPUNCTURE: CPT

## 2021-03-24 PROCEDURE — 74011250636 HC RX REV CODE- 250/636: Performed by: INTERNAL MEDICINE

## 2021-03-24 PROCEDURE — 80069 RENAL FUNCTION PANEL: CPT

## 2021-03-24 PROCEDURE — C9113 INJ PANTOPRAZOLE SODIUM, VIA: HCPCS | Performed by: FAMILY MEDICINE

## 2021-03-24 PROCEDURE — 82272 OCCULT BLD FECES 1-3 TESTS: CPT

## 2021-03-24 PROCEDURE — 74011250637 HC RX REV CODE- 250/637: Performed by: INTERNAL MEDICINE

## 2021-03-24 PROCEDURE — 74011250636 HC RX REV CODE- 250/636: Performed by: FAMILY MEDICINE

## 2021-03-24 PROCEDURE — 85025 COMPLETE CBC W/AUTO DIFF WBC: CPT

## 2021-03-24 PROCEDURE — 65660000000 HC RM CCU STEPDOWN

## 2021-03-24 PROCEDURE — 80048 BASIC METABOLIC PNL TOTAL CA: CPT

## 2021-03-24 PROCEDURE — 84300 ASSAY OF URINE SODIUM: CPT

## 2021-03-24 PROCEDURE — 84100 ASSAY OF PHOSPHORUS: CPT

## 2021-03-24 PROCEDURE — 74011000258 HC RX REV CODE- 258: Performed by: INTERNAL MEDICINE

## 2021-03-24 PROCEDURE — 80053 COMPREHEN METABOLIC PANEL: CPT

## 2021-03-24 RX ORDER — POTASSIUM CHLORIDE 750 MG/1
40 TABLET, FILM COATED, EXTENDED RELEASE ORAL 2 TIMES DAILY
Status: COMPLETED | OUTPATIENT
Start: 2021-03-24 | End: 2021-03-25

## 2021-03-24 RX ORDER — DESMOPRESSIN ACETATE 4 UG/ML
2 INJECTION, SOLUTION INTRAVENOUS; SUBCUTANEOUS ONCE
Status: COMPLETED | OUTPATIENT
Start: 2021-03-24 | End: 2021-03-24

## 2021-03-24 RX ORDER — SODIUM CHLORIDE 9 MG/ML
75 INJECTION, SOLUTION INTRAVENOUS CONTINUOUS
Status: DISCONTINUED | OUTPATIENT
Start: 2021-03-24 | End: 2021-03-24

## 2021-03-24 RX ORDER — DEXTROSE MONOHYDRATE 50 MG/ML
70 INJECTION, SOLUTION INTRAVENOUS CONTINUOUS
Status: DISCONTINUED | OUTPATIENT
Start: 2021-03-24 | End: 2021-03-25

## 2021-03-24 RX ORDER — DEXTROSE MONOHYDRATE 50 MG/ML
400 INJECTION, SOLUTION INTRAVENOUS ONCE
Status: COMPLETED | OUTPATIENT
Start: 2021-03-24 | End: 2021-03-24

## 2021-03-24 RX ORDER — MAGNESIUM SULFATE HEPTAHYDRATE 40 MG/ML
2 INJECTION, SOLUTION INTRAVENOUS ONCE
Status: COMPLETED | OUTPATIENT
Start: 2021-03-24 | End: 2021-03-24

## 2021-03-24 RX ADMIN — DESMOPRESSIN ACETATE 2 MCG: 4 SOLUTION INTRAVENOUS at 19:17

## 2021-03-24 RX ADMIN — POTASSIUM CHLORIDE 40 MEQ: 750 TABLET, EXTENDED RELEASE ORAL at 07:59

## 2021-03-24 RX ADMIN — PANTOPRAZOLE SODIUM 40 MG: 40 INJECTION, POWDER, FOR SOLUTION INTRAVENOUS at 22:36

## 2021-03-24 RX ADMIN — LORAZEPAM 2 MG: 2 INJECTION INTRAMUSCULAR; INTRAVENOUS at 06:08

## 2021-03-24 RX ADMIN — LORAZEPAM 2 MG: 2 INJECTION INTRAMUSCULAR; INTRAVENOUS at 18:29

## 2021-03-24 RX ADMIN — Medication 10 ML: at 22:40

## 2021-03-24 RX ADMIN — POTASSIUM CHLORIDE 40 MEQ: 750 TABLET, EXTENDED RELEASE ORAL at 17:20

## 2021-03-24 RX ADMIN — PANTOPRAZOLE SODIUM 40 MG: 40 INJECTION, POWDER, FOR SOLUTION INTRAVENOUS at 08:01

## 2021-03-24 RX ADMIN — LORAZEPAM 2 MG: 2 INJECTION INTRAMUSCULAR; INTRAVENOUS at 17:20

## 2021-03-24 RX ADMIN — SODIUM CHLORIDE 75 ML/HR: 9 INJECTION, SOLUTION INTRAVENOUS at 07:59

## 2021-03-24 RX ADMIN — FLUTICASONE PROPIONATE 2 SPRAY: 50 SPRAY, METERED NASAL at 08:01

## 2021-03-24 RX ADMIN — Medication 10 ML: at 06:02

## 2021-03-24 RX ADMIN — THIAMINE HCL TAB 100 MG 100 MG: 100 TAB at 08:15

## 2021-03-24 RX ADMIN — PROPRANOLOL HYDROCHLORIDE 10 MG: 10 TABLET ORAL at 08:15

## 2021-03-24 RX ADMIN — Medication 10 ML: at 17:04

## 2021-03-24 RX ADMIN — DEXTROSE MONOHYDRATE 400 ML: 50 INJECTION, SOLUTION INTRAVENOUS at 19:07

## 2021-03-24 RX ADMIN — DEXTROSE MONOHYDRATE 100 ML/HR: 50 INJECTION, SOLUTION INTRAVENOUS at 21:09

## 2021-03-24 RX ADMIN — MAGNESIUM SULFATE HEPTAHYDRATE 2 G: 40 INJECTION, SOLUTION INTRAVENOUS at 08:01

## 2021-03-24 RX ADMIN — BUPROPION HYDROCHLORIDE 150 MG: 150 TABLET, EXTENDED RELEASE ORAL at 08:00

## 2021-03-24 RX ADMIN — SODIUM CHLORIDE 75 ML/HR: 9 INJECTION, SOLUTION INTRAVENOUS at 06:51

## 2021-03-24 RX ADMIN — BUPROPION HYDROCHLORIDE 150 MG: 150 TABLET, EXTENDED RELEASE ORAL at 17:20

## 2021-03-24 RX ADMIN — FOLIC ACID 1 MG: 1 TABLET ORAL at 08:04

## 2021-03-24 NOTE — PROGRESS NOTES
0700 Bedside and Verbal shift change report given to Heather Betancur and Shi RN (oncoming nurse) by Rodrigue Del Angel (offgoing nurse). Report included the following information SBAR, Kardex, ED Summary, Intake/Output, MAR, Recent Results and Cardiac Rhythm NSR. This patient was assisted with Intentional Toileting every 2 hours during this shift. Documentation of ambulation and output reflected on Flowsheet. 1850 Sodium 127 resulted, called a consult to Dr. Terri Vargas with nephrology. 200 Dr. Terri Vargas with Nephrology, new telephone orders placed at this time. Night shift RN to call MD (Dr. Terri Vargas) with 10 PM lab draw results and I&O total. ([phone])    1930 Bedside and Verbal shift change report given to Rodrigue Del Angel (oncoming nurse) by Hayder Méndez (offgoing nurse). Report included the following information SBAR, Kardex, Intake/Output, MAR, Recent Results and Cardiac Rhythm NSR.

## 2021-03-24 NOTE — PROGRESS NOTES
1900: Bedside and Verbal shift change report given to Consuelo COVARRUBIAS RN (oncoming nurse) by Amanda Sandoval and Domingo Landaverde RN (offgoing nurse). Report included the following information SBAR, Kardex, Intake/Output, Recent Results, Cardiac Rhythm SR and Alarm Parameters . Primary Nurse [employer], RN and Domingo Landaverde RN performed a dual skin assessment on this patient Impairment noted- see wound doc flow sheet. Shen score is 22.   2000: Shift  Assessment completed. Pt in bed, sitting up eating dinner. Labs drawn. Mental Status: A/Ox4            Respiratory: Lungs clear on RA            Cardiac: SR on the monitor. GI/: Active bowel sounds, up to toilet as needed. IV Lines/Drips: LFA 20G, SL.   2105: Notified Dr. Alan Montanez of Na level. Orders received to change lab draws to Q8hr, fluid restriction of 1200ml, and have morning nurse ask nephrologist about Prozac and Wellbutrin in the morning. 2200: Pt resting comfortably. 2240: TRANSFER - OUT REPORT:  Verbal report given to Elpidio Batres RN(name) on Sergey Hughes  being transferred to [facility](unit) for routine progression of care     Report consisted of patients Situation, Background, Assessment and Recommendations(SBAR). Information from the following report(s) SBAR, Kardex, Intake/Output, Recent Results, Cardiac Rhythm SR and Alarm Parameters  was reviewed with the receiving nurse. Lines:   Peripheral IV 03/22/21 Left Forearm (Active)   Site Assessment Clean, dry, & intact 03/23/21 2000   Phlebitis Assessment 0 03/23/21 2000   Infiltration Assessment 0 03/23/21 2000   Dressing Status Clean, dry, & intact 03/23/21 2000   Dressing Type Transparent 03/23/21 2000   Hub Color/Line Status Pink;Capped 03/23/21 2000   Action Taken Open ports on tubing capped 03/23/21 2000   Alcohol Cap Used Yes 03/23/21 2000    Opportunity for questions and clarification was provided.     2315: Patient transported with:   Monitor  Registered Nurse  Tech  All belongings

## 2021-03-24 NOTE — PROGRESS NOTES
Bedside and Verbal shift change report given to Mary 43 (oncoming nurse) by Naa Lu RN  (offgoing nurse). Report included the following information SBAR, Kardex, Intake/Output, Accordion and Recent Results. SHIFT REPORT:    precepting Renetta Moon    8285: Pt voided in toilet. Unable to obtain sample for urine and stool test        Bedside and Verbal shift change report given to 91 Tom Zepeda (oncoming nurse) by Yonatan Brooks RN  (offgoing nurse). Report included the following information SBAR, Kardex, Intake/Output, Accordion and Recent Results. This patient was assisted with Intentional Toileting every 2 hours during this shift. Documentation of ambulation and output reflected on Flowsheet. This patient was assisted with Intentional Toileting every 2 hours during this shift. Documentation of ambulation and output reflected on Flowsheet.

## 2021-03-24 NOTE — PROGRESS NOTES
Provided pastoral care visit to [facility] patient. Did not include sacramental care.     Tiffany Smalls

## 2021-03-24 NOTE — PROGRESS NOTES
.This patient was assisted with Intentional Toileting every 2 hours during this shift. Documentation of ambulation and output reflected on Flowsheet.

## 2021-03-24 NOTE — PROGRESS NOTES
TRANSFER - IN REPORT:    Verbal report received from ICU RN(name) on Gunnar August  being received from ICU  (unit) for routine progression of care      Report consisted of patients Situation, Background, Assessment and   Recommendations(SBAR). Information from the following report(s) SBAR, Kardex, Intake/Output, MAR, Recent Results and Cardiac Rhythm sinus was reviewed with the receiving nurse. Opportunity for questions and clarification was provided. Assessment completed upon patients arrival to unit and care assumed. Patient slept majority on the night. VSS. Patient Woke up around 0600 complaining \"not feeling good\" with a moderate headache and nausea. Scoring 8 on CIWA - ativan given per protocol. Will continue to monitor. Patient's electrolytes followed by nephrology will follow up with dayshift about replacement. No new critical labs received or orders from nephrology.

## 2021-03-24 NOTE — WOUND CARE
New Consult for abrasions  Chart reviewed    ASSESSMENT:  Patient awake, oriented X$ laying in rambo foam mattress bed. Moves easily in bed and sits at side of bed. Bed padded side rails, continent. patient stated has had multiple falls recently  AAll skin folds and bony prominences assessed    Bilateral heels, buttocks and sacral skin intact and without erythema. POA Left shin=multiple scattered abrasions, some resurfacing ,scabbed and partial thickness  POA right forearm and posterior forearm- abrasions pink/red / scabbed areas  POA left posterior elbow abrasion 2x0.4x0.1 pink/scabbing/ slight erythema  Treatment:  Xeroform to wound beds and foam applied to all areas of abrasions  Recommendations:  Cleanse abrasions with NSS and cover with xeroform and foam dressing. Turn reposition approximately every 2 hours and offload heels with pillows at all times in bed.   Discussed with Lei Richey and CHANDA Babin Postin   Re-consult as needed

## 2021-03-24 NOTE — PROGRESS NOTES
1100  Problem: Falls - Risk of  Goal: *Absence of Falls  Description: Document Nataly Fall Risk and appropriate interventions in the flowsheet.   Outcome: Progressing Towards Goal  Note: Fall Risk Interventions:  Mobility Interventions: OT consult for ADLs, PT Consult for mobility concerns, Patient to call before getting OOB  Medication Interventions: Assess postural VS orthostatic hypotension, Patient to call before getting OOB, Bed/chair exit alarm  Elimination Interventions: Bed/chair exit alarm, Call light in reach, Patient to call for help with toileting needs  History of Falls Interventions: Bed/chair exit alarm    Problem: Alcohol Withdrawal  Goal: *STG: Participates in treatment plan  Outcome: Progressing Towards Goal  Goal: *STG: Remains safe in hospital  Outcome: Progressing Towards Goal  Goal: *STG: Seeks staff when symptoms of withdrawal increase  Outcome: Progressing Towards Goal  Goal: *STG: Complies with medication therapy  Outcome: Progressing Towards Goal  Goal: *STG: Attends activities and groups  Outcome: Progressing Towards Goal  Goal: *STG: Will identify negative impact of chemical dependency including the use of tobacco, alcohol, and other substances  Outcome: Progressing Towards Goal  Goal: *STG: Verbalizes abstinence as an achievable goal  Outcome: Progressing Towards Goal  Goal: *STG: Agrees to participate in outpatient after care program to support ongoing mental health  Outcome: Progressing Towards Goal  Goal: *STG: Able to indentify relapse triggers including interpersonal/social and familial factors  Outcome: Progressing Towards Goal  Goal: *STG: Identify lifestyle changes to support long term sobriety such as vocation, employment, education, and legal issues  Outcome: Progressing Towards Goal  Goal: *STG: Maintains appropriate nutrition and hydration  Outcome: Progressing Towards Goal  Goal: *STG: Vital signs within defined limits  Outcome: Progressing Towards Goal  Goal: *STG/LTG: Relapse prevention plan in place to include housing/aftercare, leisure activities, and spirituality  Outcome: Progressing Towards Goal  Goal: Interventions  Outcome: Progressing Towards Goal     Problem: Patient Education: Go to Patient Education Activity  Goal: Patient/Family Education  Outcome: Progressing Towards Goal

## 2021-03-24 NOTE — PROGRESS NOTES
[facility]  [address] [facility], [address] [place] [address]  [phone]      Medical Progress Note      NAME: Vassie Cowden   :  1960  MRM:  [id number]    Date of service: 3/24/2021  7:42 AM       Assessment and Plan:   1. ETOH dependence with acute intoxication-last drink the evening of 3/22, ETOH 261on admission. Pt stated that she has been binge drinking for the last 1 week. No withdrawal so far. Continue CIWAA with PRN ativan, vit S38, folic acid     2. Hyponatremia. Likely due alcohol abuse. Initial Na 117. Over corrected at OSH. DDAVP was given. TSH is normal. Appreciated renal evaluation. Currently on NS     3. Elevated Transaminases. -likely related to acute ETOH use. Check acute hepatitis      4.  Melena. This happened when she was binge drinking, but not now. Check hemoccult. PPI. H/H stable. Subjective:     Chief Complaint[de-identified] Patient was seen and examined as a follow up for alcohol abuse. Chart was reviewed. no withdrawal so far      ROS:  (bold if positive, if negative)    Tolerating PT  Tolerating Diet        Objective:     Last 24hrs VS reviewed since prior progress note.  Most recent are:    Visit Vitals  BP (!) 142/80 (BP 1 Location: Right upper arm, BP Patient Position: At rest)   Pulse 73   Temp 98.1 °F (36.7 °C)   Resp 16   Ht 5' 9\" (1.753 m)   Wt 69.5 kg (153 lb 3.2 oz)   SpO2 94%   BMI 22.62 kg/m²     SpO2 Readings from Last 6 Encounters:   21 94%   21 100%   20 97%   20 97%   20 96%   10/13/18 100%            Intake/Output Summary (Last 24 hours) at 3/24/2021 0903  Last data filed at 3/24/2021 0640  Gross per 24 hour   Intake 2819.17 ml   Output 600 ml   Net 2219.17 ml        Physical Exam:    Gen:  Well-developed, well-nourished, in no acute distress  HEENT:  Pink conjunctivae, PERRL, hearing intact to voice, moist mucous membranes  Neck:  Supple, without masses, thyroid non-tender  Resp:  No accessory muscle use, clear breath sounds without wheezes rales or rhonchi  Card:  No murmurs, normal S1, S2 without thrills, bruits or peripheral edema  Abd:  Soft, non-tender, non-distended, normoactive bowel sounds are present, no palpable organomegaly and no detectable hernias  Lymph:  No cervical or inguinal adenopathy  Musc:  No cyanosis or clubbing  Skin:  No rashes or ulcers, skin turgor is good  Neuro:  Cranial nerves are grossly intact, no focal motor weakness, follows commands appropriately  Psych:  Good insight, oriented to person, place and time, alert  __________________________________________________________________  Medications Reviewed: (see below)  Medications:     Current Facility-Administered Medications   Medication Dose Route Frequency    0.9% sodium chloride infusion  75 mL/hr IntraVENous CONTINUOUS    potassium chloride SR (KLOR-CON 10) tablet 40 mEq  40 mEq Oral BID    sodium chloride (NS) flush 5-40 mL  5-40 mL IntraVENous Q8H    sodium chloride (NS) flush 5-40 mL  5-40 mL IntraVENous PRN    LORazepam (ATIVAN) injection 2 mg  2 mg IntraVENous Q1H PRN    LORazepam (ATIVAN) injection 4 mg  4 mg IntraVENous Q1H PRN    pantoprazole (PROTONIX) 40 mg in 0.9% sodium chloride 10 mL injection  40 mg IntraVENous Q12H    thiamine mononitrate (B-1) tablet 100 mg  100 mg Oral DAILY    folic acid (FOLVITE) tablet 1 mg  1 mg Oral DAILY    buPROPion SR (WELLBUTRIN SR) tablet 150 mg  150 mg Oral BID    cyclobenzaprine (FLEXERIL) tablet 5 mg  5 mg Oral BID PRN    [Held by provider] FLUoxetine (PROzac) capsule 40 mg  40 mg Oral DAILY    fluticasone propionate (FLONASE) 50 mcg/actuation nasal spray 2 Spray  2 Spray Both Nostrils DAILY    propranoloL (INDERAL) tablet 10 mg  10 mg Oral DAILY    traZODone (DESYREL) tablet 100 mg  100 mg Oral QHS PRN        Lab Data Reviewed: (see below)  Lab Review:     Recent Labs     03/24/21  0332 03/23/21  0631 03/22/21  1735   WBC 8.4 3.9 7.5   HGB 11.1* 13.0 14.5   HCT 31.8* 37.2 42.3    292 314     Recent Labs     03/24/21  0332 03/23/21 2012 03/23/21  1554 03/23/21  0631 03/23/21  0631 03/22/21  1735 03/22/21  1735   * 122* 125*   < > 131*   < > 117*   K 3.0* 3.3* 3.9   < > 4.2   < > 3.7   CL 88* 90* 92*   < > 98   < > 85*   CO2 26 24 23   < > 25   < > 22   * 125* 215*   < > 143*   < > 91   BUN 8 9 9   < > 7   < > 5*   CREA 0.49* 0.57 0.64   < > 0.60   < > 0.56*   CA 7.6* 7.7* 8.2*   < > 8.8   < > 8.5   MG 1.4*  --   --   --  1.8  --  1.8   PHOS 2.0*  --   --   --   --   --   --    ALB 2.9*  --   --   --  3.3*  --  3.4   TBILI 0.5  --   --   --  0.4  --  0.4   ALT 91*  --   --   --  125*  --  141*   INR  --   --   --   --  1.0  --   --     < > = values in this interval not displayed. Lab Results   Component Value Date/Time    Glucose,  (H) 11/01/2016 05:19 PM     No results for input(s): PH, PCO2, PO2, HCO3, FIO2 in the last 72 hours. Recent Labs     03/23/21  0631   INR 1.0     All Micro Results     Procedure Component Value Units Date/Time    MRSA CULTURE [[id number]] Collected: 03/23/21 0432    Order Status: Completed Specimen: Nasal from Nares Updated: 03/24/21 0841          I have reviewed notes of prior 24hr. Other pertinent lab: Total time spent with patient: 35 from 08:   I personally reviewed chart, notes, data and current medications in the medical record. I have personally examined and treated the patient at bedside during this period.                  Care Plan discussed with: Patient, Nursing Staff and >50% of time spent in counseling and coordination of care    Discussed:  Care Plan    Prophylaxis:  SCD's    Disposition:  Home w/Family           ___________________________________________________    Attending Physician: Santiago Shipman MD

## 2021-03-24 NOTE — PROGRESS NOTES
NEPHROLOGY PROGRESS NOTE         NAME:Jesenia Cochran                   [facility]:[id number]   ITU:6/21/9821    Chief complaints: f/u hyponatremia    Subjective: feels okay    24 hr interval history: >125>120 with D5W and DDVAP 3/23    ROS:     Objective:  Vitals:    03/23/21 2200 03/23/21 2300 03/23/21 2325 03/24/21 0312   BP: (!) 146/88  (!) 160/83 (!) 152/93   Pulse: 79 81 80 70   Resp: 14  14 14   Temp:   98 °F (36.7 °C) 97.7 °F (36.5 °C)   SpO2: 97%  97% 98%   Weight:    69.5 kg (153 lb 3.2 oz)   Height:           Intake/Output Summary (Last 24 hours) at 3/24/2021 0730  Last data filed at 3/24/2021 0640  Gross per 24 hour   Intake 3109.17 ml   Output 900 ml   Net 2209.17 ml       PHYSICAL EXAM:  GENERAL : Lying down in bed with no acute distress  HEENT: AT NC PEERLA   NECK: Supple no JVP  CVS: S1 S2 RRR, no murmur or gallops heard  RS: CTABL, no rhonchi,or wheezing heard  ABDOMEN: soft NT ND positive BS  EXTREMITY: No edema clubbing or cyanosis, pedal pulse +  NEUROLOGY: AAA X3, no focal deficit or asterixis      Labs:  CBC:    Lab Results   Component Value Date/Time    WBC 8.4 03/24/2021 03:32 AM    HGB 11.1 (L) 03/24/2021 03:32 AM    HCT 31.8 (L) 03/24/2021 03:32 AM     03/24/2021 03:32 AM     BMP:   Lab Results   Component Value Date/Time     (L) 03/24/2021 03:32 AM    K 3.0 (L) 03/24/2021 03:32 AM    CL 88 (L) 03/24/2021 03:32 AM    CO2 26 03/24/2021 03:32 AM    AGAP 6 03/24/2021 03:32 AM     (H) 03/24/2021 03:32 AM    BUN 8 03/24/2021 03:32 AM    CREA 0.49 (L) 03/24/2021 03:32 AM    GFRAA >60 03/24/2021 03:32 AM    GFRNA >60 03/24/2021 03:32 AM        Lab Results   Component Value Date/Time    Color YELLOW 03/22/2021 06:44 PM    Appearance CLEAR 03/22/2021 06:44 PM    Specific gravity 1.005 03/22/2021 06:44 PM    pH (UA) 6.5 03/22/2021 06:44 PM    Protein Negative 03/22/2021 06:44 PM    Glucose Negative 03/22/2021 06:44 PM    Ketone Negative 03/22/2021 06:44 PM    Bilirubin Negative 03/22/2021 06:44 PM    Urobilinogen 0.2 03/22/2021 06:44 PM    Nitrites Negative 03/22/2021 06:44 PM    Leukocyte Esterase Negative 03/22/2021 06:44 PM    Epithelial cells FEW 05/17/2018 02:40 PM    Bacteria NEGATIVE  05/17/2018 02:40 PM    WBC 0 to 2 05/17/2018 02:40 PM    RBC 0 05/17/2018 02:40 PM       Imaging study:    Assessment &Plan    Hypo-osmolar hyponatremia d/t beer potomania   Hypotension  Hypochloremia  Hypokalemia     Plan/discussion     NA overcorrected 14 meq in 12 hr from 117>131  Lower down the NA again with DDVAP and D5W  NA today 120  Start NS @75ml/hr  BMP Q6hr  Give KCL 40 meq BID for 2 days  Goal of NA correction 6 meq in 24 hr  Plan is discussed with patient, nurse        Care Plan discussed with:   Medical Team    Betty Robert MD  3/24/2021    [facility]:  [email or web address]  Matilda Jenkins office:  [address], [address] [place] [address],[address]  [place], [address] [facility]  Phone: [phone]  Fax :     [phone]     [place] office:  [address] [facility], [address] [place] [address]  Phone - [phone]  Fax - [phone]

## 2021-03-25 LAB
ALBUMIN SERPL-MCNC: 2.7 G/DL (ref 3.5–5)
ALBUMIN/GLOB SERPL: 0.8 {RATIO} (ref 1.1–2.2)
ALP SERPL-CCNC: 105 U/L (ref 45–117)
ALT SERPL-CCNC: 96 U/L (ref 12–78)
ANION GAP SERPL CALC-SCNC: 6 MMOL/L (ref 5–15)
ANION GAP SERPL CALC-SCNC: 7 MMOL/L (ref 5–15)
AST SERPL-CCNC: 114 U/L (ref 15–37)
BACTERIA SPEC CULT: NORMAL
BACTERIA SPEC CULT: NORMAL
BASOPHILS # BLD: 0 K/UL (ref 0–0.1)
BASOPHILS NFR BLD: 1 % (ref 0–1)
BILIRUB DIRECT SERPL-MCNC: 0.1 MG/DL (ref 0–0.2)
BILIRUB SERPL-MCNC: 0.3 MG/DL (ref 0.2–1)
BUN SERPL-MCNC: 4 MG/DL (ref 6–20)
BUN SERPL-MCNC: 7 MG/DL (ref 6–20)
BUN/CREAT SERPL: 16 (ref 12–20)
BUN/CREAT SERPL: 7 (ref 12–20)
CALCIUM SERPL-MCNC: 7.9 MG/DL (ref 8.5–10.1)
CALCIUM SERPL-MCNC: 8.4 MG/DL (ref 8.5–10.1)
CHLORIDE SERPL-SCNC: 94 MMOL/L (ref 97–108)
CHLORIDE SERPL-SCNC: 95 MMOL/L (ref 97–108)
CO2 SERPL-SCNC: 24 MMOL/L (ref 21–32)
CO2 SERPL-SCNC: 24 MMOL/L (ref 21–32)
CREAT SERPL-MCNC: 0.43 MG/DL (ref 0.55–1.02)
CREAT SERPL-MCNC: 0.54 MG/DL (ref 0.55–1.02)
DIFFERENTIAL METHOD BLD: ABNORMAL
EOSINOPHIL # BLD: 0.2 K/UL (ref 0–0.4)
EOSINOPHIL NFR BLD: 3 % (ref 0–7)
ERYTHROCYTE [DISTWIDTH] IN BLOOD BY AUTOMATED COUNT: 14.5 % (ref 11.5–14.5)
GLOBULIN SER CALC-MCNC: 3.4 G/DL (ref 2–4)
GLUCOSE SERPL-MCNC: 107 MG/DL (ref 65–100)
GLUCOSE SERPL-MCNC: 144 MG/DL (ref 65–100)
HCT VFR BLD AUTO: 33.1 % (ref 35–47)
HGB BLD-MCNC: 11.4 G/DL (ref 11.5–16)
IMM GRANULOCYTES # BLD AUTO: 0.1 K/UL (ref 0–0.04)
IMM GRANULOCYTES NFR BLD AUTO: 1 % (ref 0–0.5)
LYMPHOCYTES # BLD: 2.1 K/UL (ref 0.8–3.5)
LYMPHOCYTES NFR BLD: 28 % (ref 12–49)
MAGNESIUM SERPL-MCNC: 1.7 MG/DL (ref 1.6–2.4)
MCH RBC QN AUTO: 32.7 PG (ref 26–34)
MCHC RBC AUTO-ENTMCNC: 34.4 G/DL (ref 30–36.5)
MCV RBC AUTO: 94.8 FL (ref 80–99)
MONOCYTES # BLD: 0.9 K/UL (ref 0–1)
MONOCYTES NFR BLD: 12 % (ref 5–13)
NEUTS SEG # BLD: 4.3 K/UL (ref 1.8–8)
NEUTS SEG NFR BLD: 55 % (ref 32–75)
NRBC # BLD: 0 K/UL (ref 0–0.01)
NRBC BLD-RTO: 0 PER 100 WBC
PLATELET # BLD AUTO: 251 K/UL (ref 150–400)
PMV BLD AUTO: 9.8 FL (ref 8.9–12.9)
POTASSIUM SERPL-SCNC: 3.9 MMOL/L (ref 3.5–5.1)
POTASSIUM SERPL-SCNC: 4 MMOL/L (ref 3.5–5.1)
PROT SERPL-MCNC: 6.1 G/DL (ref 6.4–8.2)
RBC # BLD AUTO: 3.49 M/UL (ref 3.8–5.2)
SERVICE CMNT-IMP: NORMAL
SODIUM SERPL-SCNC: 125 MMOL/L (ref 136–145)
SODIUM SERPL-SCNC: 125 MMOL/L (ref 136–145)
WBC # BLD AUTO: 7.6 K/UL (ref 3.6–11)

## 2021-03-25 PROCEDURE — 83735 ASSAY OF MAGNESIUM: CPT

## 2021-03-25 PROCEDURE — 82248 BILIRUBIN DIRECT: CPT

## 2021-03-25 PROCEDURE — 74011250636 HC RX REV CODE- 250/636: Performed by: INTERNAL MEDICINE

## 2021-03-25 PROCEDURE — 74011250637 HC RX REV CODE- 250/637: Performed by: INTERNAL MEDICINE

## 2021-03-25 PROCEDURE — 65660000000 HC RM CCU STEPDOWN

## 2021-03-25 PROCEDURE — 36415 COLL VENOUS BLD VENIPUNCTURE: CPT

## 2021-03-25 PROCEDURE — 94760 N-INVAS EAR/PLS OXIMETRY 1: CPT

## 2021-03-25 PROCEDURE — C9113 INJ PANTOPRAZOLE SODIUM, VIA: HCPCS | Performed by: FAMILY MEDICINE

## 2021-03-25 PROCEDURE — 80053 COMPREHEN METABOLIC PANEL: CPT

## 2021-03-25 PROCEDURE — 85025 COMPLETE CBC W/AUTO DIFF WBC: CPT

## 2021-03-25 PROCEDURE — 74011250636 HC RX REV CODE- 250/636: Performed by: FAMILY MEDICINE

## 2021-03-25 RX ORDER — ACETAMINOPHEN 325 MG/1
650 TABLET ORAL
Status: DISCONTINUED | OUTPATIENT
Start: 2021-03-25 | End: 2021-03-26 | Stop reason: HOSPADM

## 2021-03-25 RX ORDER — LORAZEPAM 2 MG/ML
1 INJECTION INTRAMUSCULAR
Status: DISCONTINUED | OUTPATIENT
Start: 2021-03-25 | End: 2021-03-26 | Stop reason: HOSPADM

## 2021-03-25 RX ORDER — NALOXONE HYDROCHLORIDE 0.4 MG/ML
0.4 INJECTION, SOLUTION INTRAMUSCULAR; INTRAVENOUS; SUBCUTANEOUS
Status: DISCONTINUED | OUTPATIENT
Start: 2021-03-25 | End: 2021-03-26 | Stop reason: HOSPADM

## 2021-03-25 RX ORDER — TRAZODONE HYDROCHLORIDE 100 MG/1
100 TABLET ORAL
Status: DISCONTINUED | OUTPATIENT
Start: 2021-03-25 | End: 2021-03-26 | Stop reason: HOSPADM

## 2021-03-25 RX ORDER — LORAZEPAM 2 MG/ML
2 INJECTION INTRAMUSCULAR
Status: DISCONTINUED | OUTPATIENT
Start: 2021-03-25 | End: 2021-03-26 | Stop reason: HOSPADM

## 2021-03-25 RX ORDER — OXYCODONE HYDROCHLORIDE 5 MG/1
2.5 TABLET ORAL
Status: DISCONTINUED | OUTPATIENT
Start: 2021-03-25 | End: 2021-03-26 | Stop reason: HOSPADM

## 2021-03-25 RX ADMIN — PROPRANOLOL HYDROCHLORIDE 10 MG: 10 TABLET ORAL at 09:23

## 2021-03-25 RX ADMIN — FLUTICASONE PROPIONATE 2 SPRAY: 50 SPRAY, METERED NASAL at 09:24

## 2021-03-25 RX ADMIN — POTASSIUM CHLORIDE 40 MEQ: 750 TABLET, EXTENDED RELEASE ORAL at 09:23

## 2021-03-25 RX ADMIN — TRAZODONE HYDROCHLORIDE 100 MG: 100 TABLET ORAL at 22:07

## 2021-03-25 RX ADMIN — FOLIC ACID 1 MG: 1 TABLET ORAL at 09:23

## 2021-03-25 RX ADMIN — LORAZEPAM 2 MG: 2 INJECTION INTRAMUSCULAR; INTRAVENOUS at 13:43

## 2021-03-25 RX ADMIN — Medication 10 ML: at 04:35

## 2021-03-25 RX ADMIN — Medication 10 ML: at 13:44

## 2021-03-25 RX ADMIN — OXYCODONE 2.5 MG: 5 TABLET ORAL at 18:00

## 2021-03-25 RX ADMIN — PANTOPRAZOLE SODIUM 40 MG: 40 INJECTION, POWDER, FOR SOLUTION INTRAVENOUS at 09:23

## 2021-03-25 RX ADMIN — THIAMINE HCL TAB 100 MG 100 MG: 100 TAB at 09:23

## 2021-03-25 RX ADMIN — Medication 10 ML: at 22:08

## 2021-03-25 RX ADMIN — BUPROPION HYDROCHLORIDE 150 MG: 150 TABLET, EXTENDED RELEASE ORAL at 17:43

## 2021-03-25 RX ADMIN — PANTOPRAZOLE SODIUM 40 MG: 40 INJECTION, POWDER, FOR SOLUTION INTRAVENOUS at 22:07

## 2021-03-25 RX ADMIN — POTASSIUM CHLORIDE 40 MEQ: 750 TABLET, EXTENDED RELEASE ORAL at 17:43

## 2021-03-25 RX ADMIN — BUPROPION HYDROCHLORIDE 150 MG: 150 TABLET, EXTENDED RELEASE ORAL at 09:23

## 2021-03-25 NOTE — PROGRESS NOTES
Maru Ling  YOB: 1960          Assessment & Plan:   1. Hyponatremia  - stop all IVF  - she has a negative fluid balance  - with a Na of 125 its time to let her kidneys do what they clearly want to do which is resolve this issue. - rate of correction overall is okay  - will follow        Subjective:   CC: HYPONATREMIA   HPI: Patient seen and examined. Na on D5W stable at 125. She has a negative fluid balance. ROS: Denies current sob or nausea   Current Facility-Administered Medications   Medication Dose Route Frequency    potassium chloride SR (KLOR-CON 10) tablet 40 mEq  40 mEq Oral BID    sodium chloride (NS) flush 5-40 mL  5-40 mL IntraVENous Q8H    sodium chloride (NS) flush 5-40 mL  5-40 mL IntraVENous PRN    LORazepam (ATIVAN) injection 2 mg  2 mg IntraVENous Q1H PRN    LORazepam (ATIVAN) injection 4 mg  4 mg IntraVENous Q1H PRN    pantoprazole (PROTONIX) 40 mg in 0.9% sodium chloride 10 mL injection  40 mg IntraVENous Q12H    thiamine mononitrate (B-1) tablet 100 mg  100 mg Oral DAILY    folic acid (FOLVITE) tablet 1 mg  1 mg Oral DAILY    buPROPion SR (WELLBUTRIN SR) tablet 150 mg  150 mg Oral BID    cyclobenzaprine (FLEXERIL) tablet 5 mg  5 mg Oral BID PRN    [Held by provider] FLUoxetine (PROzac) capsule 40 mg  40 mg Oral DAILY    fluticasone propionate (FLONASE) 50 mcg/actuation nasal spray 2 Spray  2 Spray Both Nostrils DAILY    propranoloL (INDERAL) tablet 10 mg  10 mg Oral DAILY    traZODone (DESYREL) tablet 100 mg  100 mg Oral QHS PRN          Objective:     Vitals:  Blood pressure (!) 152/94, pulse 68, temperature 98 °F (36.7 °C), resp. rate 15, height 5' 9\" (1.753 m), weight 67.9 kg (149 lb 11.1 oz), SpO2 97 %.   Temp (24hrs), Av.9 °F (36.6 °C), Min:97.6 °F (36.4 °C), Max:98 °F (36.7 °C)      Intake and Output:   0701 -  1900  In: 480 [P.O.:480]  Out: 1800 [EZBYB:2271]  03/23 1901 - 03/25 0700  In: 3247.9 [P.O.:1300; I.V.:1947.9]  Out: 4150 [Urine:4150]    Physical Exam:                Patient is intubated:  NO    Physical Examination:   GENERAL ASSESSMENT: NAD  HEENT:Nontraumatic   CHEST: CTA  HEART: S1S2  ABDOMEN: Soft,NT,  :Gardiner:   EXTREMITY: EDEMA N  NEURO:Grossly non focal          ECG/rhythm:    Data Review      No results for input(s): TNIPOC in the last 72 hours. No lab exists for component: ITNL   No results for input(s): CPK, CKMB, TROIQ in the last 72 hours. Recent Labs     03/25/21  0423 03/24/21 2032 03/24/21  1715 03/24/21 0332 03/24/21 0332 03/23/21  0631 03/23/21  0631   * 126* 127*   < > 120*   < > 131*   K 3.9 4.0 3.9   < > 3.0*   < > 4.2   CL 95* 97 96*   < > 88*   < > 98   CO2 24 23 24   < > 26   < > 25   BUN 7 8 6   < > 8   < > 7   CREA 0.43* 0.51* 0.43*   < > 0.49*   < > 0.60   * 129* 99   < > 106*   < > 143*   PHOS  --  2.2*  --   --  2.0*  --   --    MG 1.7  --   --   --  1.4*  --  1.8   CA 7.9* 7.7* 8.2*   < > 7.6*   < > 8.8   ALB 2.7* 2.9*  --   --  2.9*  --  3.3*   WBC 7.6  --   --   --  8.4  --  3.9   HGB 11.4*  --   --   --  11.1*  --  13.0   HCT 33.1*  --   --   --  31.8*  --  37.2     --   --   --  246  --  292    < > = values in this interval not displayed. Recent Labs     03/23/21  0631   INR 1.0   PTP 10.8   APTT 23.4     Needs: urine analysis, urine sodium, protein and creatinine  Lab Results   Component Value Date/Time    Sodium,urine random 46 03/24/2021 11:57 AM         Discussed with:  PATIENT    : Jaquita Schwab, MD  3/25/2021        [facility]:  [email or web address]  Evelia Romero office:  [address], [address] [place] [address],[address]  [place], [address] [facility]  Phone: [phone]  Fax :     [phone]    [place] office:  [address] [facility], [address]  Phone - [phone]  Fax - [phone]

## 2021-03-25 NOTE — PROGRESS NOTES
Physician Progress Note      PATIENT:               Oswaldo Demarco  CSN #:                  [id number]  :                       1960  ADMIT DATE:       3/23/2021 5:15 AM  DISCH DATE:  RESPONDING  PROVIDER #:        Danie Case MD          QUERY TEXT:    Pt admitted with ETOH dependence with acute intoxication and hyponatremia. Noted documentation in the nursing CIWA flow sheets,  CIWA scores on 3/24/21   10,  8,  5  and  IV  Ativan 2mg given x 5.  3/25/21 CIWA  12 for agitation, Headache and visual disturbances given IM Ativan  2mg. If possible, please document in progress notes and discharge summary:    The medical record reflects the following:  Risk Factors: 64 yo F admitted with ETOH dependence with acute intoxication  Clinical Indicators: 3/24/21 Progress notes state \"No withdrawal so far. Continue CIWA with PRN Ativan\"  Noted documentation in the nursing CIWA flow sheets,  CIWA scores on 3/24/21   10,  8,  5  and  IV  Ativan 2mg given x 5.  3/25/21 CIWA  12 for agitation, Headache and visual disturbances given IM Ativan  2mg. Treatment: Ativan 2mg  given prn x 6  Options provided:  -- This patient is in Alcohol withdrawal  -- Alcohol withdrawal ruled out  -- Other - I will add my own diagnosis  -- Disagree - Not applicable / Not valid  -- Disagree - Clinically unable to determine / Unknown  -- Refer to Clinical Documentation Reviewer    PROVIDER RESPONSE TEXT:    See progress note.     Query created by: Jackeline Dalton on 3/25/2021 2:19 PM      Electronically signed by:  Danie Case MD 3/25/2021 3:17 PM

## 2021-03-25 NOTE — PROGRESS NOTES
Problem: Falls - Risk of  Goal: *Absence of Falls  Description: Document Jennifer Torres Fall Risk and appropriate interventions in the flowsheet.   Outcome: Progressing Towards Goal  Note: Fall Risk Interventions:  Mobility Interventions: OT consult for ADLs, PT Consult for mobility concerns, Patient to call before getting OOB  Medication Interventions: Patient to call before getting OOB, Teach patient to arise slowly  Elimination Interventions: Call light in reach, Patient to call for help with toileting needs  History of Falls Interventions: Bed/chair exit alarm, Door open when patient unattended    Problem: Alcohol Withdrawal  Goal: *STG: Participates in treatment plan  Outcome: Progressing Towards Goal  Goal: *STG: Remains safe in hospital  Outcome: Progressing Towards Goal  Goal: *STG: Seeks staff when symptoms of withdrawal increase  Outcome: Progressing Towards Goal  Goal: *STG: Complies with medication therapy  Outcome: Progressing Towards Goal  Goal: *STG: Attends activities and groups  Outcome: Progressing Towards Goal  Goal: *STG: Will identify negative impact of chemical dependency including the use of tobacco, alcohol, and other substances  Outcome: Progressing Towards Goal  Goal: *STG: Verbalizes abstinence as an achievable goal  Outcome: Progressing Towards Goal  Goal: *STG: Agrees to participate in outpatient after care program to support ongoing mental health  Outcome: Progressing Towards Goal  Goal: *STG: Able to indentify relapse triggers including interpersonal/social and familial factors  Outcome: Progressing Towards Goal  Goal: *STG: Identify lifestyle changes to support long term sobriety such as vocation, employment, education, and legal issues  Outcome: Progressing Towards Goal  Goal: *STG: Maintains appropriate nutrition and hydration  Outcome: Progressing Towards Goal  Goal: *STG: Vital signs within defined limits  Outcome: Progressing Towards Goal  Goal: *STG/LTG: Relapse prevention plan in place to include housing/aftercare, leisure activities, and spirituality  Outcome: Progressing Towards Goal  Goal: Interventions  Outcome: Progressing Towards Goal

## 2021-03-25 NOTE — PROGRESS NOTES
Spiritual Care Assessment/Progress Note  [address]      NAME: Sergey Hughes      MRN: [id number]  AGE: 63 y.o.  SEX: female  Roman Catholic Affiliation: Worship   Language: English     3/25/2021     Total Time (in minutes): 5     Spiritual Assessment begun in [facility] PRO CARE TELE 2 through conversation with:         [x]Patient        [] Family    [] Friend(s)        Reason for Consult: [facility]     Spiritual beliefs: (Please include comment if needed)     [x] Identifies with a ivon tradition:  Worship       [] Supported by a ivon community:            [] Claims no spiritual orientation:           [] Seeking spiritual identity:                [] Adheres to an individual form of spirituality:           [] Not able to assess:                           Identified resources for coping:      [x] Prayer                               [x] Music                  [] Guided Imagery     [x] Family/friends                 [] Pet visits     [] Devotional reading                         [] Unknown     [] Other:                                              Interventions offered during this visit: (See comments for more details)    Patient Interventions: Affirmation of ivon, Communion ([address]), Catharsis/review of pertinent events in supportive environment, Initial/Spiritual assessment, patient floor, Prayer (actual), Prayer (assurance of)           Plan of Care:     [x] Support spiritual and/or cultural needs    [] Support AMD and/or advance care planning process      [] Support grieving process   [] Coordinate Rites and/or Rituals    [] Coordination with community clergy   [] No spiritual needs identified at this time   [] Detailed Plan of Care below (See Comments)  [] Make referral to Music Therapy  [] Make referral to Pet Therapy     [] Make referral to Addiction services  [] Make referral to [facility]  [] Make referral to Spiritual Care Partner  [] No future visits requested        [] Follow up upon further referrals     Comments: Mrs. Corine Dawkins hesitated briefly about receiving communion and changed her mind. Prayer and communion offered. She did mention that Bee Evans had visited. She is from [place] and talked about being surprised she had to come here due to no ICU beds in her area at the time. She expressed her gratitude for the care she had received here.     KETURAH Whitt, RN, ACSW, LCSW   Page:  [phone]

## 2021-03-25 NOTE — PROGRESS NOTES
ANNALISE:   1) Home with f.u appts   2) Pt will need [facility] transport at time of discharge     Hospital Day 3:   4:10 PM- CM informed during IDRs pt possible d/c in 1-2 days. CM met with pt at bedside regarding substance abuse rescources- pt polietly declined stating she just started a new job. Pt was previously given resources by ICU CM. Pt states she will follow up outpatient and knows where to get help if needed. Pt states she will need [facility] transportation at time of dsicharge- unsure of d/c date, CM reassured pt that we will assist with arranging upon d/c date. Pt inquired about levain the unit to get candy- CM deferred pt to speak with RN. Pt states no further questions or concerns at this time, floor CM will continue to follow and assist as needed.      Vannesa Salinas, MSW, [address]

## 2021-03-25 NOTE — PROGRESS NOTES
Chris Butler vivian [place] [address]  [address] [facility], [address] [place] [address]  [phone]      Medical Progress Note      NAME: Jennifer Kirk   :  1960  MRM:  [id number]    Date/Time: 3/25/2021        Assessment / Plan:     62 yo F w/ hx of ETOH abuse transferred from [facility] ED (hospital closing) for severe hyponatremia     Hyponatremia: severe, 117 on admission. Over-corrected at OSH and s/p DDAVP. Now stable. Off IVF. Monitor Na closely. Defer mgmt to nephrology    ETOH dependence with acute intoxication: last drink the evening of 3/22, ETOH 261on admission. Was binge-drinking for 1 week PTA. Monitor closely on CIWA protocol with IV Ativan PRN     Elevated Transaminases:  ETOH abuse. US showed diffuse hepatic steatosis. Advised complete cessation from ETOH use, as she is high risk for development of cirrhosis. HCV negative     Melena: This happened when she was binge drinking, but not now. ?gastritis. Hg borderline, now really anemic. FOBT positive. Continue PPI. Outpatient colonoscopy +/- EGD is needed       Total time spent: 35 minutes  Time spent in the care of this patient including reviewing records, discussing with nursing and/or other providers on the treatment team, obtaining history and examining the patient, and discussing treatment plans. Care Plan discussed with: Patient, Nursing Staff and >50% of time spent in counseling and coordination of care    Discussed:  Care Plan and D/C Planning    Prophylaxis:  SCD's and H2B/PPI    Disposition:  Home w/Family         Subjective:     Chief Complaint:  Follow up hyponatremia    Chart/notes/labs/studies reviewed, patient examined. Feels okay. Reports some left-sided abdominal pain. No n/v/d. No fevers          Objective:       Vitals:        Last 24hrs VS reviewed since prior progress note.  Most recent are:    Visit Vitals  /83 (BP 1 Location: Right arm, BP Patient Position: At rest)   Pulse 72   Temp 97.9 °F (36.6 °C)   Resp 14   Ht 5' 9\" (1.753 m)   Wt 67.9 kg (149 lb 11.1 oz)   SpO2 98%   BMI 22.11 kg/m²     SpO2 Readings from Last 6 Encounters:   03/25/21 98%   03/22/21 100%   12/13/20 97%   12/08/20 97%   03/12/20 96%   10/13/18 100%            Intake/Output Summary (Last 24 hours) at 3/25/2021 1716  Last data filed at 3/25/2021 1133  Gross per 24 hour   Intake 2061.67 ml   Output 2900 ml   Net -838.33 ml          Exam:     Physical Exam:    Gen:  Chronically ill-appearing. NAD  HEENT:  Atraumatic, normocephalic. Sclerae nonicteric, hearing intact to voice  Neck:  Supple, without apparent masses. Resp:  No accessory muscle use, CTAB without wheezes, rales, or rhonchi  Card: RRR, without m/r/g. No LE edema  Abd:  +bowel sounds, soft, NTTP, nondistended. Neuro: Face symmetric, speech fluent, follows commands appropriately, no focal weakness or numbness  Psych:  Alert, oriented x 3.  Fair insight     Medications Reviewed: (see below)    Lab Data Reviewed: (see below)    ______________________________________________________________________    Medications:     Current Facility-Administered Medications   Medication Dose Route Frequency    acetaminophen (TYLENOL) tablet 650 mg  650 mg Oral Q8H PRN    LORazepam (ATIVAN) injection 1 mg  1 mg IntraVENous Q1H PRN    LORazepam (ATIVAN) injection 2 mg  2 mg IntraVENous Q1H PRN    oxyCODONE IR (ROXICODONE) tablet 2.5 mg  2.5 mg Oral Q6H PRN    naloxone (NARCAN) injection 0.4 mg  0.4 mg IntraVENous EVERY 2 MINUTES AS NEEDED    traZODone (DESYREL) tablet 100 mg  100 mg Oral QHS    potassium chloride SR (KLOR-CON 10) tablet 40 mEq  40 mEq Oral BID    sodium chloride (NS) flush 5-40 mL  5-40 mL IntraVENous Q8H    sodium chloride (NS) flush 5-40 mL  5-40 mL IntraVENous PRN    pantoprazole (PROTONIX) 40 mg in 0.9% sodium chloride 10 mL injection  40 mg IntraVENous Q12H    thiamine mononitrate (B-1) tablet 100 mg  100 mg Oral DAILY    folic acid (FOLVITE) tablet 1 mg  1 mg Oral DAILY    buPROPion SR (WELLBUTRIN SR) tablet 150 mg  150 mg Oral BID    cyclobenzaprine (FLEXERIL) tablet 5 mg  5 mg Oral BID PRN    [Held by provider] FLUoxetine (PROzac) capsule 40 mg  40 mg Oral DAILY    fluticasone propionate (FLONASE) 50 mcg/actuation nasal spray 2 Spray  2 Spray Both Nostrils DAILY    propranoloL (INDERAL) tablet 10 mg  10 mg Oral DAILY    traZODone (DESYREL) tablet 100 mg  100 mg Oral QHS PRN            Lab Review:     Recent Labs     03/25/21 0423 03/24/21 0332 03/23/21  0631   WBC 7.6 8.4 3.9   HGB 11.4* 11.1* 13.0   HCT 33.1* 31.8* 37.2    246 292     Recent Labs     03/25/21 0423 03/24/21 2032 03/24/21  1715 03/24/21 0332 03/24/21 0332 03/23/21  0631 03/23/21  0631   * 126* 127*   < > 120*   < > 131*   K 3.9 4.0 3.9   < > 3.0*   < > 4.2   CL 95* 97 96*   < > 88*   < > 98   CO2 24 23 24   < > 26   < > 25   * 129* 99   < > 106*   < > 143*   BUN 7 8 6   < > 8   < > 7   CREA 0.43* 0.51* 0.43*   < > 0.49*   < > 0.60   CA 7.9* 7.7* 8.2*   < > 7.6*   < > 8.8   MG 1.7  --   --   --  1.4*  --  1.8   PHOS  --  2.2*  --   --  2.0*  --   --    ALB 2.7* 2.9*  --   --  2.9*  --  3.3*   ALT 96*  --   --   --  91*  --  125*   INR  --   --   --   --   --   --  1.0    < > = values in this interval not displayed. No components found for: GLPOC  No results for input(s): PH, PCO2, PO2, HCO3, FIO2 in the last 72 hours.   Recent Labs     03/23/21  0631   INR 1.0     Lab Results   Component Value Date/Time    Specimen Description: LUNG BILATERAL BRONCHIAL WASHINGS 06/08/2010 04:30 PM    Specimen Description: LUNG BILATERAL BRONCHIAL WASHINGS 06/08/2010 04:30 PM    Specimen Description: LUNG BILTERAL BRONCHIAL WASHINGS 06/08/2010 04:30 PM     Lab Results   Component Value Date/Time    Culture result: MRSA NOT PRESENT 03/23/2021 04:32 AM    Culture result:  03/23/2021 04:32 AM     Screening of patient nares for MRSA is for surveillance purposes and, if positive, to facilitate isolation considerations in high risk settings. It is not intended for automatic decolonization interventions per se as regimens are not sufficiently effective to warrant routine use.     Culture result: FEW  ENTEROCOCCUS FAECALIS GROUP D   07/17/2015 02:30 PM    Culture result: FEW  STAPHYLOCOCCUS SPECIES, COAGULASE NEGATIVE   07/17/2015 02:30 PM    Culture result: RARE  DIPHTHEROIDS   07/17/2015 02:30 PM    Culture result: ACINETOBACTER SPECIES  ISOLATED FROM BROTH ONLY   07/17/2015 02:30 PM    Culture result: NO ANAEROBES ISOLATED 5 DAYS 07/17/2015 02:30 PM    Culture result: NO FUNGUS ISOLATED 31 DAYS 07/17/2015 02:30 PM    Culture result: NO AFB ISOLATED AT 8 WEEKS 07/17/2015 02:30 PM              ___________________________________________________    Attending Physician: Mariza Bradshaw MD

## 2021-03-25 NOTE — PROGRESS NOTES
Bedside and Verbal shift change report given to quynh (oncoming nurse) by Nadia Jerry RN (offgoing nurse). Report included the following information SBAR, Kardex, Intake/Output, MAR, Recent Results and Cardiac Rhythm sinus. Notified Dr. Claudette Frederic of patient's I&O and lab results. Rate change for D5 70ml/hr and continue labs as ordered.

## 2021-03-25 NOTE — PROGRESS NOTES
Bedside and Verbal shift change report given to Mary 44 (oncoming nurse) by Sandy Perdue RN  (offgoing nurse). Report included the following information SBAR, Kardex, Intake/Output, Accordion and Recent Results. SHIFT REPORT:    precepting Connor Tripathi            Bedside and Verbal shift change report given to ---- (oncoming nurse) by Chasity Castellanos RN  (offgoing nurse). Report included the following information SBAR, Kardex, Intake/Output, Accordion and Recent Results. This patient was assisted with Intentional Toileting every 2 hours during this shift. Documentation of ambulation and output reflected on Flowsheet. This patient was assisted with Intentional Toileting every 2 hours during this shift. Documentation of ambulation and output reflected on Flowsheet.       DOCUMENTING IN DISASTER MODE

## 2021-03-25 NOTE — PROGRESS NOTES
0700 Bedside and Verbal shift change report given to Heather Betancur and Ene Olivares RN(oncoming nurse) by Quoc Justice RN (offgoing nurse). Report included the following information SBAR, Kardex, Intake/Output, MAR, Recent Results and Cardiac Rhythm NSR. This patient was assisted with Intentional Toileting every 2 hours during this shift. Documentation of ambulation and output reflected on Flowsheet. 18 Notified Dr. Agnes Gan of patient's increase anxiety and elevated CIWA score (12). Per MD treat per Ativan PRN orders. 1930 Bedside and Verbal shift change report given to Jory Andrade (oncoming nurse) by Tori Wade RN (offgoing nurse). Report included the following information SBAR, Kardex, Intake/Output, MAR, Recent Results and Cardiac Rhythm NSR.

## 2021-03-26 ENCOUNTER — APPOINTMENT (OUTPATIENT)
Dept: CT IMAGING | Age: 61
End: 2021-03-26
Attending: INTERNAL MEDICINE
Payer: MEDICAID

## 2021-03-26 VITALS
WEIGHT: 144.8 LBS | TEMPERATURE: 98 F | BODY MASS INDEX: 21.45 KG/M2 | SYSTOLIC BLOOD PRESSURE: 127 MMHG | DIASTOLIC BLOOD PRESSURE: 85 MMHG | HEIGHT: 69 IN | OXYGEN SATURATION: 97 % | RESPIRATION RATE: 18 BRPM | HEART RATE: 77 BPM

## 2021-03-26 LAB
ALBUMIN SERPL-MCNC: 3.2 G/DL (ref 3.5–5)
ALBUMIN/GLOB SERPL: 0.9 {RATIO} (ref 1.1–2.2)
ALP SERPL-CCNC: 118 U/L (ref 45–117)
ALT SERPL-CCNC: 106 U/L (ref 12–78)
ANION GAP SERPL CALC-SCNC: 8 MMOL/L (ref 5–15)
AST SERPL-CCNC: 115 U/L (ref 15–37)
ATRIAL RATE: 90 BPM
BASOPHILS # BLD: 0.1 K/UL (ref 0–0.1)
BASOPHILS NFR BLD: 1 % (ref 0–1)
BILIRUB SERPL-MCNC: 0.3 MG/DL (ref 0.2–1)
BUN SERPL-MCNC: 6 MG/DL (ref 6–20)
BUN/CREAT SERPL: 12 (ref 12–20)
CALCIUM SERPL-MCNC: 8.7 MG/DL (ref 8.5–10.1)
CALCULATED P AXIS, ECG09: 61 DEGREES
CALCULATED R AXIS, ECG10: 24 DEGREES
CALCULATED T AXIS, ECG11: 38 DEGREES
CHLORIDE SERPL-SCNC: 99 MMOL/L (ref 97–108)
CO2 SERPL-SCNC: 24 MMOL/L (ref 21–32)
COMMENT, HOLDF: NORMAL
CREAT SERPL-MCNC: 0.52 MG/DL (ref 0.55–1.02)
D DIMER PPP FEU-MCNC: 0.54 MG/L FEU (ref 0–0.65)
DIAGNOSIS, 93000: NORMAL
DIFFERENTIAL METHOD BLD: ABNORMAL
EOSINOPHIL # BLD: 0.3 K/UL (ref 0–0.4)
EOSINOPHIL NFR BLD: 4 % (ref 0–7)
ERYTHROCYTE [DISTWIDTH] IN BLOOD BY AUTOMATED COUNT: 14.3 % (ref 11.5–14.5)
GLOBULIN SER CALC-MCNC: 3.7 G/DL (ref 2–4)
GLUCOSE SERPL-MCNC: 123 MG/DL (ref 65–100)
HCT VFR BLD AUTO: 37.7 % (ref 35–47)
HGB BLD-MCNC: 12.8 G/DL (ref 11.5–16)
IMM GRANULOCYTES # BLD AUTO: 0.1 K/UL (ref 0–0.04)
IMM GRANULOCYTES NFR BLD AUTO: 2 % (ref 0–0.5)
LIPASE SERPL-CCNC: 40 U/L (ref 73–393)
LYMPHOCYTES # BLD: 2 K/UL (ref 0.8–3.5)
LYMPHOCYTES NFR BLD: 26 % (ref 12–49)
MAGNESIUM SERPL-MCNC: 1.9 MG/DL (ref 1.6–2.4)
MCH RBC QN AUTO: 32.6 PG (ref 26–34)
MCHC RBC AUTO-ENTMCNC: 34 G/DL (ref 30–36.5)
MCV RBC AUTO: 95.9 FL (ref 80–99)
MONOCYTES # BLD: 1.2 K/UL (ref 0–1)
MONOCYTES NFR BLD: 15 % (ref 5–13)
NEUTS SEG # BLD: 4.2 K/UL (ref 1.8–8)
NEUTS SEG NFR BLD: 52 % (ref 32–75)
NRBC # BLD: 0 K/UL (ref 0–0.01)
NRBC BLD-RTO: 0 PER 100 WBC
P-R INTERVAL, ECG05: 142 MS
PHOSPHATE SERPL-MCNC: 3.8 MG/DL (ref 2.6–4.7)
PLATELET # BLD AUTO: 290 K/UL (ref 150–400)
PMV BLD AUTO: 10 FL (ref 8.9–12.9)
POTASSIUM SERPL-SCNC: 4.3 MMOL/L (ref 3.5–5.1)
PROT SERPL-MCNC: 6.9 G/DL (ref 6.4–8.2)
Q-T INTERVAL, ECG07: 376 MS
QRS DURATION, ECG06: 76 MS
QTC CALCULATION (BEZET), ECG08: 459 MS
RBC # BLD AUTO: 3.93 M/UL (ref 3.8–5.2)
SAMPLES BEING HELD,HOLD: NORMAL
SODIUM SERPL-SCNC: 131 MMOL/L (ref 136–145)
VENTRICULAR RATE, ECG03: 90 BPM
WBC # BLD AUTO: 7.9 K/UL (ref 3.6–11)

## 2021-03-26 PROCEDURE — 74011250637 HC RX REV CODE- 250/637: Performed by: INTERNAL MEDICINE

## 2021-03-26 PROCEDURE — 36415 COLL VENOUS BLD VENIPUNCTURE: CPT

## 2021-03-26 PROCEDURE — C9113 INJ PANTOPRAZOLE SODIUM, VIA: HCPCS | Performed by: FAMILY MEDICINE

## 2021-03-26 PROCEDURE — 83735 ASSAY OF MAGNESIUM: CPT

## 2021-03-26 PROCEDURE — 94760 N-INVAS EAR/PLS OXIMETRY 1: CPT

## 2021-03-26 PROCEDURE — 74011250636 HC RX REV CODE- 250/636: Performed by: FAMILY MEDICINE

## 2021-03-26 PROCEDURE — 74176 CT ABD & PELVIS W/O CONTRAST: CPT

## 2021-03-26 PROCEDURE — 84100 ASSAY OF PHOSPHORUS: CPT

## 2021-03-26 PROCEDURE — 83690 ASSAY OF LIPASE: CPT

## 2021-03-26 PROCEDURE — 85025 COMPLETE CBC W/AUTO DIFF WBC: CPT

## 2021-03-26 PROCEDURE — 80053 COMPREHEN METABOLIC PANEL: CPT

## 2021-03-26 PROCEDURE — 93005 ELECTROCARDIOGRAM TRACING: CPT

## 2021-03-26 PROCEDURE — 85379 FIBRIN DEGRADATION QUANT: CPT

## 2021-03-26 RX ORDER — TRAZODONE HYDROCHLORIDE 100 MG/1
100 TABLET ORAL
Qty: 30 TAB | Refills: 0 | Status: SHIPPED | OUTPATIENT
Start: 2021-03-26

## 2021-03-26 RX ORDER — FLUTICASONE PROPIONATE 50 MCG
2 SPRAY, SUSPENSION (ML) NASAL DAILY
Qty: 1 BOTTLE | Refills: 0 | Status: SHIPPED | OUTPATIENT
Start: 2021-03-26

## 2021-03-26 RX ORDER — PROPRANOLOL HYDROCHLORIDE 10 MG/1
10 TABLET ORAL DAILY
Qty: 30 TAB | Refills: 0 | Status: SHIPPED | OUTPATIENT
Start: 2021-03-26

## 2021-03-26 RX ORDER — FOLIC ACID 1 MG/1
1 TABLET ORAL DAILY
Qty: 30 TAB | Refills: 0 | Status: SHIPPED | OUTPATIENT
Start: 2021-03-26 | End: 2021-03-26

## 2021-03-26 RX ORDER — CYCLOBENZAPRINE HCL 5 MG
5 TABLET ORAL
Qty: 30 TAB | Refills: 0 | Status: SHIPPED | OUTPATIENT
Start: 2021-03-26

## 2021-03-26 RX ORDER — FOLIC ACID 1 MG/1
1 TABLET ORAL DAILY
Qty: 30 TAB | Refills: 0 | Status: SHIPPED | OUTPATIENT
Start: 2021-03-26

## 2021-03-26 RX ORDER — ASPIRIN 325 MG/1
100 TABLET, FILM COATED ORAL DAILY
Qty: 30 TAB | Refills: 0 | Status: SHIPPED | OUTPATIENT
Start: 2021-03-26 | End: 2021-03-26

## 2021-03-26 RX ORDER — ASPIRIN 325 MG/1
100 TABLET, FILM COATED ORAL DAILY
Qty: 30 TAB | Refills: 0 | Status: SHIPPED | OUTPATIENT
Start: 2021-03-26

## 2021-03-26 RX ORDER — FLUTICASONE PROPIONATE AND SALMETEROL 250; 50 UG/1; UG/1
1 POWDER RESPIRATORY (INHALATION) EVERY 12 HOURS
Qty: 1 EACH | Refills: 0 | Status: SHIPPED | OUTPATIENT
Start: 2021-03-26

## 2021-03-26 RX ORDER — BUPROPION HYDROCHLORIDE 150 MG/1
150 TABLET, EXTENDED RELEASE ORAL 2 TIMES DAILY
Qty: 60 TAB | Refills: 0 | Status: SHIPPED | OUTPATIENT
Start: 2021-03-26

## 2021-03-26 RX ADMIN — OXYCODONE 2.5 MG: 5 TABLET ORAL at 04:20

## 2021-03-26 RX ADMIN — FLUTICASONE PROPIONATE 2 SPRAY: 50 SPRAY, METERED NASAL at 10:03

## 2021-03-26 RX ADMIN — Medication 10 ML: at 06:53

## 2021-03-26 RX ADMIN — Medication 10 ML: at 14:00

## 2021-03-26 RX ADMIN — FOLIC ACID 1 MG: 1 TABLET ORAL at 10:02

## 2021-03-26 RX ADMIN — BUPROPION HYDROCHLORIDE 150 MG: 150 TABLET, EXTENDED RELEASE ORAL at 10:02

## 2021-03-26 RX ADMIN — THIAMINE HCL TAB 100 MG 100 MG: 100 TAB at 10:02

## 2021-03-26 RX ADMIN — PANTOPRAZOLE SODIUM 40 MG: 40 INJECTION, POWDER, FOR SOLUTION INTRAVENOUS at 10:02

## 2021-03-26 RX ADMIN — PROPRANOLOL HYDROCHLORIDE 10 MG: 10 TABLET ORAL at 10:02

## 2021-03-26 NOTE — PROGRESS NOTES
Patient requested me to refill her home medications.  Sent electronically to her pharmacy per her request.

## 2021-03-26 NOTE — PROGRESS NOTES
Attempted to schedule hospital follow up PCP appointment. Awaiting call back from the office with appointment information. Pending patient discharge. Rik Edouard, Care Management Specialist.     Hospital follow-up PCP transitional care appointment has been scheduled with Dr. Candis Almanza for Monday, 3/29/21 at 11:45 a.m. Pending patient discharge.   Rik Edouard, Care Management Specialist.

## 2021-03-26 NOTE — PROGRESS NOTES
4:47 PM Medicaid van transportation arrived, patient wheeled to vehicle    3:40 PM Patient resting on bed watching TV awaiting medicaid cab for discharge home. Per REGGIE Castellanos RN, patient was provided with discharge information. 3: 01 PM Bedside and Verbal shift change report given to BEVERLEY Klein RN (oncoming nurse) by Fernando Castellanos RN (offgoing nurse). Report included the following information SBAR, Kardex, Intake/Output, MAR, Recent Results and Cardiac Rhythm NSR.

## 2021-03-26 NOTE — DISCHARGE SUMMARY
Physician Discharge Summary     Patient ID:  Elise Dickinson  [id number]  80 y.o.  1960    Admit date: 3/23/2021    Discharge date: 3/26/2021    Admission Diagnoses: Hyponatremia [E87.1]  EtOH dependence ([facility] [F10.20]    Principal Discharge Diagnoses:    Hyponatremia  ETOH abuse    OTHER PROBLEMS ADDRESSEDS  Principal Diagnosis <principal problem not specified>                                            Active Problems:    Hyponatremia (3/23/2021)      EtOH dependence ([facility] (3/23/2021)       Patient Active Problem List   Diagnosis Code    Tobacco abuse Z72.0    Alcohol abuse F10.10    Cellulitis of abdominal wall L03.311    Anxiety F41.9    Chronic obstructive pulmonary disease ([facility] J44.9    Essential hypertension I10    Rectal prolapse K62.3    Hyponatremia E87.1    EtOH dependence [facility]) F10.20         Hospital Course:   Ms. Christiano Holloway is a 64 yo F w/ hx of ETOH abuse transferred from [facility] ED (hospital closing) for severe hyponatremia      Hyponatremia: severe, 117 on admission. Over-corrected at OSH and s/p DDAVP. Now stable. Off IVF. Discussed with nephrology and cleared for discharge. Follow up outpatient     ETOH dependence with acute intoxication: Was binge-drinking for 1 week PTA. Monitored closely on CIWA protocol, showing no signs of withdrawal    Elevated Transaminases: 2/2 ETOH abuse. US showed diffuse hepatic steatosis. Advised complete cessation from ETOH use, as she is high risk for development of cirrhosis. HCV negative     Melena: This happened when she was binge drinking, but not now. ?gastritis. Hg borderline, now really anemic. FOBT positive. Continue PPI. Outpatient colonoscopy +/- EGD is needed. Advised outpatient GI follow up       Pt discharged in improved and stable condition. Procedures performed: see above    Imaging studies: see above  [facility]    Result Date: 3/23/2021  1. Diffuse hepatic steatosis. 2. No gallstone or biliary ductal dilation demonstrated. PCP: LYNDSEY Samson    Consults: Nephrology    Discharge Exam:  Patient Vitals for the past 12 hrs:   Temp Pulse Resp BP SpO2   03/26/21 0822 98 °F (36.7 °C) 76 16 111/71 95 %   03/26/21 0701 -- 88 -- -- --   03/26/21 0337 97.8 °F (36.6 °C) 87 14 113/74 94 %   03/25/21 2319 97.7 °F (36.5 °C) 80 16 112/70 95 %   03/25/21 2302 -- 79 -- -- --     GEN: NAD  CV: RRR  RESP: CTAB  ABD: NTTP    Disposition: home    Patient Instructions:   Current Discharge Medication List      START taking these medications    Details   folic acid (FOLVITE) 1 mg tablet Take 1 Tab by mouth daily. Qty: 30 Tab, Refills: 0      thiamine mononitrate (B-1) 100 mg tablet Take 1 Tab by mouth daily. Qty: 30 Tab, Refills: 0         CONTINUE these medications which have CHANGED    Details   buPROPion SR (WELLBUTRIN SR) 150 mg SR tablet Take 1 Tab by mouth two (2) times a day. Qty: 60 Tab, Refills: 0      cyclobenzaprine (FLEXERIL) 5 mg tablet Take 1 Tab by mouth two (2) times daily as needed for Muscle Spasm(s). Qty: 30 Tab, Refills: 0      fluticasone propion-salmeteroL (Wixela Inhub) 250-50 mcg/dose diskus inhaler Take 1 Puff by inhalation every twelve (12) hours. Qty: 1 Each, Refills: 0      fluticasone propionate (Flonase Allergy Relief) 50 mcg/actuation nasal spray 2 Sprays by Both Nostrils route daily. Qty: 1 Bottle, Refills: 0      multivitamin (THERAGRAN) liquid Take 5 mL by mouth daily. Qty: 1 Bottle, Refills: 0      !! propranoloL (INDERAL) 10 mg tablet Take 1 Tab by mouth daily. Qty: 30 Tab, Refills: 0      traZODone (DESYREL) 100 mg tablet Take 1 Tab by mouth nightly as needed for Sleep. Qty: 30 Tab, Refills: 0       !! - Potential duplicate medications found. Please discuss with provider. CONTINUE these medications which have NOT CHANGED    Details   Omeprazole delayed release (PRILOSEC D/R) 20 mg tablet Take 20 mg by mouth daily.       !! propranoloL (INDERAL) 10 mg tablet Take 10 mg by mouth daily as needed for Anxiety. naphazoline-pheniramine (Opcon-A) ophthalmic solution Administer 1 Drop to both eyes daily as needed. ondansetron (Zofran ODT) 4 mg disintegrating tablet Take 1-2 Tabs by mouth every eight (8) hours as needed for Nausea or Vomiting. Qty: 15 Tab, Refills: 1       !! - Potential duplicate medications found. Please discuss with provider. STOP taking these medications       Biotin 2,500 mcg cap Comments:   Reason for Stopping:         FLUoxetine (PROZAC) 40 mg capsule Comments:   Reason for Stopping:               Activity: See discharge instructions  Diet: See discharge instructions  Wound Care: See discharge instructions    Follow-up Information     Follow up With Specialties Details Why [address] Pawel Valencia MD Internal Medicine On 3/29/2021 Hospital follow up PCP appointment Monday, 3/29/21 at 11:45 a.m. [address]  [phone]      follow up with nephrology for low sodium as needed              I spent 35 minutes on this discharge. Signed:  Pepito Fairbanks MD  3/26/2021  9:41 AM    CC: PCP Dr. Carola [address]   .

## 2021-03-26 NOTE — PROGRESS NOTES
Problem: Falls - Risk of  Goal: *Absence of Falls  Description: Document Anjum Starkey Fall Risk and appropriate interventions in the flowsheet.   Outcome: Resolved/Met     Problem: Patient Education: Go to Patient Education Activity  Goal: Patient/Family Education  Outcome: Resolved/Met     Problem: Alcohol Withdrawal  Goal: *STG: Participates in treatment plan  Outcome: Resolved/Met  Goal: *STG: Remains safe in hospital  Outcome: Resolved/Met  Goal: *STG: Seeks staff when symptoms of withdrawal increase  Outcome: Resolved/Met  Goal: *STG: Complies with medication therapy  Outcome: Resolved/Met  Goal: *STG: Attends activities and groups  Outcome: Resolved/Met  Goal: *STG: Will identify negative impact of chemical dependency including the use of tobacco, alcohol, and other substances  Outcome: Resolved/Met  Goal: *STG: Verbalizes abstinence as an achievable goal  Outcome: Resolved/Met  Goal: *STG: Agrees to participate in outpatient after care program to support ongoing mental health  Outcome: Resolved/Met  Goal: *STG: Able to indentify relapse triggers including interpersonal/social and familial factors  Outcome: Resolved/Met  Goal: *STG: Identify lifestyle changes to support long term sobriety such as vocation, employment, education, and legal issues  Outcome: Resolved/Met  Goal: *STG: Maintains appropriate nutrition and hydration  Outcome: Resolved/Met  Goal: *STG: Vital signs within defined limits  Outcome: Resolved/Met  Goal: *STG/LTG: Relapse prevention plan in place to include housing/aftercare, leisure activities, and spirituality  Outcome: Resolved/Met  Goal: Interventions  Outcome: Resolved/Met     Problem: Patient Education: Go to Patient Education Activity  Goal: Patient/Family Education  Outcome: Resolved/Met

## 2021-03-26 NOTE — PROGRESS NOTES
1500: Bedside shift change report given to CHANDA Chávez (oncoming nurse) by Rehan White RN (offgoing nurse). Report included the following information SBAR, Kardex, Intake/Output, MAR, Recent Results and Cardiac Rhythm NSR.

## 2021-03-26 NOTE — PROGRESS NOTES
Problem: Falls - Risk of  Goal: *Absence of Falls  Description: Document Leroy Blanc Fall Risk and appropriate interventions in the flowsheet.   Outcome: Progressing Towards Goal  Note: Fall Risk Interventions:  Mobility Interventions: OT consult for ADLs, PT Consult for mobility concerns, Patient to call before getting OOB         Medication Interventions: Patient to call before getting OOB, Teach patient to arise slowly    Elimination Interventions: Call light in reach, Patient to call for help with toileting needs    History of Falls Interventions: Bed/chair exit alarm, Door open when patient unattended         Problem: Alcohol Withdrawal  Goal: *STG: Participates in treatment plan  Outcome: Progressing Towards Goal  Goal: *STG: Remains safe in hospital  Outcome: Progressing Towards Goal  Goal: *STG: Seeks staff when symptoms of withdrawal increase  Outcome: Progressing Towards Goal  Goal: *STG: Complies with medication therapy  Outcome: Progressing Towards Goal  Goal: *STG: Identify lifestyle changes to support long term sobriety such as vocation, employment, education, and legal issues  Outcome: Progressing Towards Goal  Goal: Interventions  Outcome: Progressing Towards Goal

## 2021-03-26 NOTE — PROGRESS NOTES
1100; Tele notified that the HR was in the 140s sustaining. Notified Dr. Prosper Conner. Ekg done, vitals done. Pt states she is feeling a little short of breath.

## 2021-03-26 NOTE — PROGRESS NOTES
3/26/2021     1:28 PM  Discharge confirmed for today per discussion with RN, Johanna Guillen Medicaid transportation arranged for 4:30pm, confirmation #[id number]. PCP follow-up scheduled by Poonam Cortez CM, for 3/29/21. Patient aware. 12:42 PM    CM noted discharge orders and subsequent order for CT abd/pelvis. Will await further recs from RN regarding d/c today and will set up Medicaid transport to [place] when confirmed.      Irma Patel RN

## 2021-03-26 NOTE — PROGRESS NOTES
Maru Ling  YOB: 1960          Assessment & Plan:   1. Hyponatremia d/t hypovolemia  -NA stable 131 from 125  -she has net negative balance  - rate of correction overall is okay  -liberalize fluid intake 1.8l       Subjective:   CC: HYPONATREMIA   HPI: Patient seen and examined.   ROS: Denies current sob or nausea   Current Facility-Administered Medications   Medication Dose Route Frequency    acetaminophen (TYLENOL) tablet 650 mg  650 mg Oral Q8H PRN    LORazepam (ATIVAN) injection 1 mg  1 mg IntraVENous Q1H PRN    LORazepam (ATIVAN) injection 2 mg  2 mg IntraVENous Q1H PRN    oxyCODONE IR (ROXICODONE) tablet 2.5 mg  2.5 mg Oral Q6H PRN    naloxone (NARCAN) injection 0.4 mg  0.4 mg IntraVENous EVERY 2 MINUTES AS NEEDED    traZODone (DESYREL) tablet 100 mg  100 mg Oral QHS    sodium chloride (NS) flush 5-40 mL  5-40 mL IntraVENous Q8H    sodium chloride (NS) flush 5-40 mL  5-40 mL IntraVENous PRN    pantoprazole (PROTONIX) 40 mg in 0.9% sodium chloride 10 mL injection  40 mg IntraVENous Q12H    thiamine mononitrate (B-1) tablet 100 mg  100 mg Oral DAILY    folic acid (FOLVITE) tablet 1 mg  1 mg Oral DAILY    buPROPion SR (WELLBUTRIN SR) tablet 150 mg  150 mg Oral BID    cyclobenzaprine (FLEXERIL) tablet 5 mg  5 mg Oral BID PRN    [Held by provider] FLUoxetine (PROzac) capsule 40 mg  40 mg Oral DAILY    fluticasone propionate (FLONASE) 50 mcg/actuation nasal spray 2 Spray  2 Spray Both Nostrils DAILY    propranoloL (INDERAL) tablet 10 mg  10 mg Oral DAILY    traZODone (DESYREL) tablet 100 mg  100 mg Oral QHS PRN          Objective:     Vitals:  Blood pressure 113/74, pulse 87, temperature 97.8 °F (36.6 °C), resp. rate 14, height 5' 9\" (1.753 m), weight 65.7 kg (144 lb 12.8 oz), SpO2 94 %.   Temp (24hrs), Av.9 °F (36.6 °C), Min:97.7 °F (36.5 °C), Max:98.4 °F (36.9 °C)      Intake and Output:  No intake/output data recorded. 03/24 1901 - 03/26 0700  In: 2861.7 [P.O.:2100; I.V.:761.7]  Out: 4770 [Urine:4770]    Physical Exam:                Patient is intubated:  NO    Physical Examination:   GENERAL ASSESSMENT: NAD  HEENT:Nontraumatic   CHEST: CTA  HEART: S1S2  ABDOMEN: Soft,NT,  :Gardiner:   EXTREMITY: EDEMA N  NEURO:Grossly non focal          ECG/rhythm:    Data Review      No results for input(s): TNIPOC in the last 72 hours. No lab exists for component: ITNL   No results for input(s): CPK, CKMB, TROIQ in the last 72 hours. Recent Labs     03/26/21 0423 03/25/21 1750 03/25/21 0423 03/24/21 2032 03/24/21 0332 03/24/21 0332   * 125* 125* 126*   < > 120*   K 4.3 4.0 3.9 4.0   < > 3.0*   CL 99 94* 95* 97   < > 88*   CO2 24 24 24 23   < > 26   BUN 6 4* 7 8   < > 8   CREA 0.52* 0.54* 0.43* 0.51*   < > 0.49*   * 144* 107* 129*   < > 106*   PHOS 3.8  --   --  2.2*  --  2.0*   MG 1.9  --  1.7  --   --  1.4*   CA 8.7 8.4* 7.9* 7.7*   < > 7.6*   ALB 3.2*  --  2.7* 2.9*  --  2.9*   WBC 7.9  --  7.6  --   --  8.4   HGB 12.8  --  11.4*  --   --  11.1*   HCT 37.7  --  33.1*  --   --  31.8*     --  251  --   --  246    < > = values in this interval not displayed. No results for input(s): INR, PTP, APTT, INREXT, INREXT in the last 72 hours. Needs: urine analysis, urine sodium, protein and creatinine  Lab Results   Component Value Date/Time    Sodium,urine random 46 03/24/2021 11:57 AM         Discussed with:  PATIENT    : Amy Suárez MD  3/26/2021        [facility]:  [email or web address]  Matilda Jenkins office:  [address] [address],[address]  [address]  Phone: [phone]  Fax :     [phone]    [facility] office:  [address] [facility],  Michelle Garcia  Phone - [phone]  Fax - [phone]

## 2021-03-26 NOTE — DISCHARGE INSTRUCTIONS
HOSPITALIST DISCHARGE INSTRUCTIONS  NAME: Tiffany Sher   :  1960   MRN:  [id number]     Date/Time:  3/26/2021 9:39 AM    ADMIT DATE: 3/23/2021     DISCHARGE DATE: 3/26/2021     PRINCIPAL DISCHARGE DIAGNOSES:  Low sodium level  Alcohol abuse    MEDICATIONS:  · It is important that you take the medication exactly as they are prescribed. Note the changes and additions to your medications. Be sure you understand these changes before you are discharged today. · Keep your medication in the bottles provided by the pharmacist and keep a list of the medication names, dosages, and times to be taken in your wallet. · Do not take other medications without consulting your doctor. · Do not resume Prozac(fluoxetine) until you are instructed to do so by your primary care doctor (repeat EKG to check QTc)    Pain Management: per above medications    What to do at Home    Recommended diet:  Regular Diet with fluid restriction 1800cc    Recommended activity: Activity as tolerated    If you experience any of the following symptoms then please call your primary care physician or return to the emergency room if you cannot get hold of your doctor:  Fever, chills, severe abdominal pain, nausea, vomiting, diarrhea, confusion, weakness, falling, bleeding, severe chest pain, shortness of breath, or other severe concerning symptoms    Follow Up: Follow-up Information     Follow up With Specialties Details Why [address], Sherryle Creamer, MD Internal Medicine In 3 days  [place] [address]  [phone]      follow up with nephrology for low sodium as needed                Information obtained by :  I understand that if any problems occur once I am at home I am to contact my physician. I understand and acknowledge receipt of the instructions indicated above. Physician's or R.N.'s Signature                                                                  Date/Time                                                                                                                                              Patient or Representative Signature                                                          Date/Time

## 2021-04-02 LAB
HAV IGM SER QL: NONREACTIVE
HBV CORE IGM SER QL: NONREACTIVE
HBV SURFACE AG SER QL: 0.1 INDEX
HBV SURFACE AG SER QL: NEGATIVE
HCV AB SERPL QL IA: NONREACTIVE
HCV COMMENT,HCGAC: NORMAL
SP1: NORMAL
SP2: NORMAL
SP3: NORMAL

## 2022-03-18 PROBLEM — F10.20 ETOH DEPENDENCE (HCC): Status: ACTIVE | Noted: 2021-03-23

## 2022-03-18 PROBLEM — K62.3 RECTAL PROLAPSE: Status: ACTIVE | Noted: 2018-04-23

## 2022-03-18 PROBLEM — I10 ESSENTIAL HYPERTENSION: Status: ACTIVE | Noted: 2018-04-19

## 2022-03-18 PROBLEM — J44.9 CHRONIC OBSTRUCTIVE PULMONARY DISEASE (HCC): Status: ACTIVE | Noted: 2018-04-19

## 2022-03-20 PROBLEM — F41.9 ANXIETY: Status: ACTIVE | Noted: 2018-04-19

## 2022-03-20 PROBLEM — E87.1 HYPONATREMIA: Status: ACTIVE | Noted: 2021-03-23

## 2023-05-30 RX ORDER — FOLIC ACID 1 MG/1
TABLET ORAL DAILY
COMMUNITY
Start: 2021-03-26

## 2023-05-30 RX ORDER — FLUTICASONE PROPIONATE AND SALMETEROL 250; 50 UG/1; UG/1
1 POWDER RESPIRATORY (INHALATION) EVERY 12 HOURS
COMMUNITY
Start: 2021-03-26

## 2023-05-30 RX ORDER — OMEPRAZOLE 20 MG/1
20 TABLET, DELAYED RELEASE ORAL DAILY
COMMUNITY

## 2023-05-30 RX ORDER — CYCLOBENZAPRINE HCL 5 MG
TABLET ORAL 2 TIMES DAILY PRN
COMMUNITY
Start: 2021-03-26

## 2023-05-30 RX ORDER — SOFT LENS ADJUNCTIVE SOLUTIONS
1 DROPS OPHTHALMIC (EYE) DAILY PRN
COMMUNITY

## 2023-05-30 RX ORDER — ONDANSETRON 4 MG/1
TABLET, ORALLY DISINTEGRATING ORAL EVERY 8 HOURS PRN
COMMUNITY
Start: 2020-12-08

## 2023-05-30 RX ORDER — TRAZODONE HYDROCHLORIDE 100 MG/1
TABLET ORAL
COMMUNITY
Start: 2021-03-26

## 2023-05-30 RX ORDER — PROPRANOLOL HYDROCHLORIDE 10 MG/1
TABLET ORAL DAILY PRN
COMMUNITY
Start: 2021-03-26

## 2023-05-30 RX ORDER — LANOLIN ALCOHOL/MO/W.PET/CERES
CREAM (GRAM) TOPICAL DAILY
COMMUNITY
Start: 2021-03-26

## 2023-05-30 RX ORDER — BUPROPION HYDROCHLORIDE 150 MG/1
TABLET, EXTENDED RELEASE ORAL 2 TIMES DAILY
COMMUNITY
Start: 2021-03-26

## 2023-05-30 RX ORDER — FLUTICASONE PROPIONATE 50 MCG
2 SPRAY, SUSPENSION (ML) NASAL DAILY
COMMUNITY
Start: 2021-03-26

## (undated) DEVICE — KENDALL SCD EXPRESS SLEEVES, KNEE LENGTH, MEDIUM: Brand: KENDALL SCD

## (undated) DEVICE — ZINACTIVE USE 2641837 CLIP LIG M BLU TI HRT SHP WIRE HORZ 600 PER BX

## (undated) DEVICE — PREP CHLORAPREP 10.5 ML ORG --

## (undated) DEVICE — STAPLER SKIN H3.9MM WIRE DIA0.58MM CRWN 6.9MM 35 STPL ROT

## (undated) DEVICE — GOWN,SIRUS,NONRNF,SETINSLV,2XL,18/CS: Brand: MEDLINE

## (undated) DEVICE — CONTAINER PREFIL FRMLN 15ML --

## (undated) DEVICE — SHEARS SEAL L45CM DIA5MM CRV BLDE W ERGO GRP HNDL HARM ACE

## (undated) DEVICE — SUTURE VCRL SZ 3-0 L18IN ABSRB UD L26MM SH 1/2 CIR J864D

## (undated) DEVICE — LIGHT HANDLE: Brand: DEVON

## (undated) DEVICE — HOOK RETRCT 12MM E 2 FNGR FOR LONE STAR RETRCT SYS

## (undated) DEVICE — STERILE POLYISOPRENE POWDER-FREE SURGICAL GLOVES: Brand: PROTEXIS

## (undated) DEVICE — KENDALL 500 SERIES DIAPHORETIC FOAM MONITORING ELECTRODE - TEAR DROP SHAPE ( 30/PK): Brand: KENDALL

## (undated) DEVICE — RING RETRCTR FIG 8 32.5X18.3CM -- PLAS STRL W/2 CATH CLIPS

## (undated) DEVICE — GOWN,SIRUS,FABRNF,L,20/CS: Brand: MEDLINE

## (undated) DEVICE — FLEX ADVANTAGE 1500CC: Brand: FLEX ADVANTAGE

## (undated) DEVICE — KIT COLON W/ 1.1OZ LUB AND 2 END

## (undated) DEVICE — Device

## (undated) DEVICE — BASIN EMESIS 500CC ROSE 250/CS 60/PLT: Brand: MEDEGEN MEDICAL PRODUCTS, LLC

## (undated) DEVICE — SOLUTION IV 1000ML 0.9% SOD CHL

## (undated) DEVICE — DEVICE INFL 60ML 12ATM CONVENIENT LOK REL HNDL HI PRSS FLX

## (undated) DEVICE — TISSUE FUSION OPEN INSTRUMENT: Brand: LIGASURE ATLAS

## (undated) DEVICE — SYR 50ML SLIP TIP NSAF LF STRL --

## (undated) DEVICE — PACK PROCEDURE SURG LAPAROSCOPY 17X7 MM BRTRC PRIMUS

## (undated) DEVICE — MEDI-VAC NON-CONDUCTIVE SUCTION TUBING: Brand: CARDINAL HEALTH

## (undated) DEVICE — CLIP LIG ADH PD W SLOT HORZ --

## (undated) DEVICE — INTENDED FOR TISSUE SEPARATION, AND OTHER PROCEDURES THAT REQUIRE A SHARP SURGICAL BLADE TO PUNCTURE OR CUT.: Brand: BARD-PARKER ® CARBON RIB-BACK BLADES

## (undated) DEVICE — TRAY PREP DRY W/ PREM GLV 2 APPL 6 SPNG 2 UNDPD 1 OVERWRAP

## (undated) DEVICE — BLADE ELECTRODE: Brand: VALLEYLAB

## (undated) DEVICE — SPONGE LAP 18X18IN STRL -- 5/PK

## (undated) DEVICE — SEALER TISS L20CM DIA13MM ADV BPLR L CRV JAW OPN APPRCH

## (undated) DEVICE — REM POLYHESIVE ADULT PATIENT RETURN ELECTRODE: Brand: VALLEYLAB

## (undated) DEVICE — DRAPE IRRIG FLD WRM W44XL44IN W/ AORN STD PRTBL INTRATEMP

## (undated) DEVICE — DRAPE XR C ARM MOB SURG 44X72 --

## (undated) DEVICE — FORCEPS BX L240CM JAW DIA2.8MM L CAP W/ NDL MIC MESH TOOTH